# Patient Record
Sex: FEMALE | Race: BLACK OR AFRICAN AMERICAN | NOT HISPANIC OR LATINO | Employment: OTHER | URBAN - METROPOLITAN AREA
[De-identification: names, ages, dates, MRNs, and addresses within clinical notes are randomized per-mention and may not be internally consistent; named-entity substitution may affect disease eponyms.]

---

## 2019-08-07 DIAGNOSIS — M25.512 LEFT SHOULDER PAIN: Primary | ICD-10-CM

## 2019-08-23 ENCOUNTER — HOSPITAL ENCOUNTER (OUTPATIENT)
Dept: RADIOLOGY | Facility: HOSPITAL | Age: 64
Discharge: HOME | End: 2019-08-23
Attending: PAIN MEDICINE
Payer: MEDICARE

## 2019-08-23 DIAGNOSIS — M25.512 LEFT SHOULDER PAIN: ICD-10-CM

## 2019-08-23 DIAGNOSIS — M25.512 LEFT SHOULDER PAIN: Primary | ICD-10-CM

## 2019-08-23 PROCEDURE — 73030 X-RAY EXAM OF SHOULDER: CPT | Mod: TC,PO,LT

## 2019-09-16 ENCOUNTER — TELEPHONE (OUTPATIENT)
Dept: FAMILY MEDICINE | Facility: CLINIC | Age: 64
End: 2019-09-16

## 2019-09-16 DIAGNOSIS — E78.5 DM TYPE 2 WITH DIABETIC DYSLIPIDEMIA: ICD-10-CM

## 2019-09-16 DIAGNOSIS — N18.30 CKD (CHRONIC KIDNEY DISEASE) STAGE 3, GFR 30-59 ML/MIN: ICD-10-CM

## 2019-09-16 DIAGNOSIS — E78.5 DYSLIPIDEMIA: Primary | ICD-10-CM

## 2019-09-16 DIAGNOSIS — E11.69 DM TYPE 2 WITH DIABETIC DYSLIPIDEMIA: ICD-10-CM

## 2019-09-16 NOTE — TELEPHONE ENCOUNTER
----- Message from Joan Chen sent at 9/16/2019  2:48 PM CDT -----  Contact: Rhonda  The patient has an apt tomorrow with Priyanka. She did not have her labs done yet. Can she still be see ?  pts # [phone]

## 2019-09-16 NOTE — TELEPHONE ENCOUNTER
----- Message from Joan Chen sent at 9/16/2019  9:27 AM CDT -----  Contact: Rhonda  The patient has an apt September 17th. Does the patient need labs ? She goes to [facility] . pts # [phone]

## 2019-09-17 ENCOUNTER — OFFICE VISIT (OUTPATIENT)
Dept: FAMILY MEDICINE | Facility: CLINIC | Age: 64
End: 2019-09-17
Payer: MEDICARE

## 2019-09-17 VITALS
SYSTOLIC BLOOD PRESSURE: 150 MMHG | BODY MASS INDEX: 29.53 KG/M2 | WEIGHT: 173 LBS | HEIGHT: 64 IN | HEART RATE: 76 BPM | DIASTOLIC BLOOD PRESSURE: 72 MMHG

## 2019-09-17 DIAGNOSIS — K21.9 GASTROESOPHAGEAL REFLUX DISEASE, ESOPHAGITIS PRESENCE NOT SPECIFIED: ICD-10-CM

## 2019-09-17 DIAGNOSIS — E11.69 DM TYPE 2 WITH DIABETIC DYSLIPIDEMIA: Primary | ICD-10-CM

## 2019-09-17 DIAGNOSIS — M51.36 DDD (DEGENERATIVE DISC DISEASE), LUMBAR: ICD-10-CM

## 2019-09-17 DIAGNOSIS — I10 ESSENTIAL HYPERTENSION: ICD-10-CM

## 2019-09-17 DIAGNOSIS — E78.5 DYSLIPIDEMIA: ICD-10-CM

## 2019-09-17 DIAGNOSIS — E78.5 DM TYPE 2 WITH DIABETIC DYSLIPIDEMIA: Primary | ICD-10-CM

## 2019-09-17 LAB — HBA1C MFR BLD: 8 %

## 2019-09-17 PROCEDURE — 99213 PR OFFICE/OUTPT VISIT, EST, LEVL III, 20-29 MIN: ICD-10-PCS | Mod: S$GLB,,, | Performed by: NURSE PRACTITIONER

## 2019-09-17 PROCEDURE — 99213 OFFICE O/P EST LOW 20 MIN: CPT | Mod: S$GLB,,, | Performed by: NURSE PRACTITIONER

## 2019-09-17 PROCEDURE — 83036 POCT HEMOGLOBIN A1C: ICD-10-PCS | Mod: QW,,, | Performed by: NURSE PRACTITIONER

## 2019-09-17 PROCEDURE — 83036 HEMOGLOBIN GLYCOSYLATED A1C: CPT | Mod: QW,,, | Performed by: NURSE PRACTITIONER

## 2019-09-17 RX ORDER — HYDROCHLOROTHIAZIDE 25 MG/1
25 TABLET ORAL DAILY
Qty: 30 TABLET | Refills: 2 | Status: SHIPPED | OUTPATIENT
Start: 2019-09-17 | End: 2019-12-17

## 2019-09-17 RX ORDER — OMEPRAZOLE 20 MG/1
CAPSULE, DELAYED RELEASE ORAL
Refills: 0 | COMMUNITY
Start: 2019-07-16 | End: 2019-09-17 | Stop reason: SDUPTHER

## 2019-09-17 RX ORDER — MELOXICAM 15 MG/1
15 TABLET ORAL DAILY
Qty: 90 TABLET | Refills: 1 | Status: SHIPPED | OUTPATIENT
Start: 2019-09-17 | End: 2019-12-17 | Stop reason: SDUPTHER

## 2019-09-17 RX ORDER — CALCIUM CARBONATE/VITAMIN D3 600MG-5MCG
1 TABLET ORAL DAILY
COMMUNITY
Start: 2018-01-23

## 2019-09-17 RX ORDER — PRAVASTATIN SODIUM 10 MG/1
10 TABLET ORAL DAILY
Qty: 90 TABLET | Refills: 1 | Status: SHIPPED | OUTPATIENT
Start: 2019-09-17 | End: 2019-09-19 | Stop reason: DRUGHIGH

## 2019-09-17 RX ORDER — ACETAMINOPHEN 500 MG
1 TABLET ORAL DAILY
COMMUNITY
Start: 2018-01-23

## 2019-09-17 RX ORDER — METFORMIN HYDROCHLORIDE 1000 MG/1
1000 TABLET ORAL
COMMUNITY
Start: 2015-06-25 | End: 2019-12-17 | Stop reason: SDUPTHER

## 2019-09-17 RX ORDER — PRAVASTATIN SODIUM 10 MG/1
10 TABLET ORAL
COMMUNITY
Start: 2015-06-25 | End: 2019-09-17 | Stop reason: SDUPTHER

## 2019-09-17 RX ORDER — ASPIRIN 325 MG
325 TABLET ORAL DAILY
COMMUNITY
Start: 2015-11-04

## 2019-09-17 RX ORDER — MELOXICAM 15 MG/1
15 TABLET ORAL DAILY
COMMUNITY
End: 2019-09-17 | Stop reason: SDUPTHER

## 2019-09-17 RX ORDER — OMEPRAZOLE 20 MG/1
20 CAPSULE, DELAYED RELEASE ORAL DAILY
Qty: 90 CAPSULE | Refills: 1 | Status: SHIPPED | OUTPATIENT
Start: 2019-09-17 | End: 2022-02-03 | Stop reason: SDUPTHER

## 2019-09-17 NOTE — PROGRESS NOTES
SUBJECTIVE:    Patient ID: Rhonda Mojica is a 64 y.o. female.    Chief Complaint: Follow-up    Pt here for regular f/u. C/oing of left calf aching pain- went and saw Dr. Barron for lumbar DDD/sciatica, no new meds added and she has f/u tomorrow. Per Dr. Barron's note MBB was to be scheduled but pt reports she never had it.  Reports has been out of ozempic for over a month- can't afford the copay. Also admits hasn't been watching her diet d/t stress- checks sugars occasionally and it will run 150-180      Office Visit on 09/17/2019   Component Date Value Ref Range Status    Hemoglobin A1C 09/17/2019 8.0  % Final       History reviewed. No pertinent past medical history.  History reviewed. No pertinent surgical history.  History reviewed. No pertinent family history.    Marital Status:   Alcohol History:  reports that she does not drink alcohol.  Tobacco History:  reports that she has never smoked. She has never used smokeless tobacco.  Drug History:  reports that she does not use drugs.    Review of patient's allergies indicates:  No Known Allergies    Current Outpatient Medications:     aspirin 325 MG tablet, Take 325 mg by mouth., Disp: , Rfl:     calcium-vitamin D tablet 600 mg-200 units, Take by mouth., Disp: , Rfl:     cholecalciferol, vitamin D3, (VITAMIN D3) 2,000 unit Cap, Take by mouth., Disp: , Rfl:     meloxicam (MOBIC) 15 MG tablet, Take 1 tablet (15 mg total) by mouth once daily., Disp: 90 tablet, Rfl: 1    metFORMIN (GLUCOPHAGE) 1000 MG tablet, Take 1,000 mg by mouth., Disp: , Rfl:     omeprazole (PRILOSEC) 20 MG capsule, Take 1 capsule (20 mg total) by mouth once daily., Disp: 90 capsule, Rfl: 1    pravastatin (PRAVACHOL) 10 MG tablet, Take 1 tablet (10 mg total) by mouth once daily., Disp: 90 tablet, Rfl: 1    hydroCHLOROthiazide (HYDRODIURIL) 25 MG tablet, Take 1 tablet (25 mg total) by mouth once daily. Daily for 1 week then every other day, Disp: 30 tablet, Rfl: 2    semaglutide  "(OZEMPIC) 0.25 mg or 0.5 mg(2 mg/1.5 mL) PnIj, Inject 0.25 mg into the skin once a week. Inject 0.25mg once a week for 2 weeks then increase to 0.5mg once a week, Disp: 1.5 mL, Rfl: 0    Review of Systems   Constitutional: Negative for appetite change, chills and fever.   Respiratory: Negative for cough, shortness of breath and wheezing.    Cardiovascular: Positive for leg swelling (mild swelling to bilat feet/ankles). Negative for chest pain and palpitations.   Gastrointestinal: Negative for abdominal pain, constipation and diarrhea.   Genitourinary: Negative for difficulty urinating, dysuria, frequency and hematuria.   Musculoskeletal: Negative for back pain and gait problem.   Skin: Negative for rash.   Neurological: Positive for numbness (aching type pain left calf). Negative for dizziness and syncope.   Psychiatric/Behavioral: Negative for dysphoric mood. The patient is not nervous/anxious.           Objective:      Vitals:    09/17/19 1421 09/17/19 2035   BP: (!) 152/60 (!) 150/72   Pulse: 76    Weight: 78.5 kg (173 lb)    Height: 5' 4" (1.626 m)      Physical Exam   Constitutional: She is oriented to person, place, and time. She appears well-developed and well-nourished.   HENT:   Head: Normocephalic and atraumatic.   Right Ear: Tympanic membrane and ear canal normal.   Left Ear: Tympanic membrane and ear canal normal.   Mouth/Throat: Mucous membranes are normal. No posterior oropharyngeal erythema.   Neck: Neck supple. Carotid bruit is not present.   Cardiovascular: Normal rate and regular rhythm. Exam reveals no gallop and no friction rub.   Murmur (gr II/VI systolic murmur 2nd ICS) heard.  Pulses:       Dorsalis pedis pulses are 2+ on the right side, and 2+ on the left side.   Pulmonary/Chest: Breath sounds normal. No respiratory distress. She has no wheezes. She has no rales.   Abdominal: Soft. She exhibits no distension. There is no tenderness.   Musculoskeletal: She exhibits edema (trace edema bilat " feet/ankles, no erythema).   Lymphadenopathy:     She has no cervical adenopathy.   Neurological: She is alert and oriented to person, place, and time. She has normal strength. Gait normal.   Skin: Skin is warm and dry. No rash noted.   Psychiatric: She has a normal mood and affect.   Nursing note and vitals reviewed.        Assessment:       1. DM type 2 with diabetic dyslipidemia    2. Dyslipidemia    3. Gastroesophageal reflux disease, esophagitis presence not specified    4. Essential hypertension    5. DDD (degenerative disc disease), lumbar           Plan:       DM type 2 with diabetic dyslipidemia  -     Hemoglobin A1C, POCT  -     semaglutide (OZEMPIC) 0.25 mg or 0.5 mg(2 mg/1.5 mL) PnIj; Inject 0.25 mg into the skin once a week. Inject 0.25mg once a week for 2 weeks then increase to 0.5mg once a week  Dispense: 1.5 mL; Refill: 0  - A1C up to 7.7% d/t noncompliance with ozempic- pt reports can't afford medication. Given #1 sample of ozempic to resume weekly which should last 5 weeks, advised to call before sample is finished and see if we have more samples- if no samples available then may need to start low dose glimeperide which she can afford    Dyslipidemia  -     pravastatin (PRAVACHOL) 10 MG tablet; Take 1 tablet (10 mg total) by mouth once daily.  Dispense: 90 tablet; Refill: 1  -diabetic dyslipidema- had labs drawn this am- will call with abnormals    Gastroesophageal reflux disease, esophagitis presence not specified  -     omeprazole (PRILOSEC) 20 MG capsule; Take 1 capsule (20 mg total) by mouth once daily.  Dispense: 90 capsule; Refill: 1  -stable    Essential hypertension  -     hydroCHLOROthiazide (HYDRODIURIL) 25 MG tablet; Take 1 tablet (25 mg total) by mouth once daily. Daily for 1 week then every other day  Dispense: 30 tablet; Refill: 2  -BP elevated today with some mild ankle edema- will add HCTZ and discussed low salt diet- recommend nurse visit in 2 weeks for BP check    DDD  (degenerative disc disease), lumbar  -     meloxicam (MOBIC) 15 MG tablet; Take 1 tablet (15 mg total) by mouth once daily.  Dispense: 90 tablet; Refill: 1  - has f/u with Dr. Barron tomorrow    Follow up in about 3 months (around 12/17/2019) for nurse visit in 2 weeks for BP check.        9/17/2019 Priyanka Garibay, NP

## 2019-09-18 LAB
ALBUMIN SERPL-MCNC: 4.2 G/DL (ref 3.6–5.1)
ALBUMIN/GLOB SERPL: 1.6 (CALC) (ref 1–2.5)
ALP SERPL-CCNC: 113 U/L (ref 33–130)
ALT SERPL-CCNC: 16 U/L (ref 6–29)
AST SERPL-CCNC: 17 U/L (ref 10–35)
BASOPHILS # BLD AUTO: 75 CELLS/UL (ref 0–200)
BASOPHILS NFR BLD AUTO: 0.6 %
BILIRUB SERPL-MCNC: 0.6 MG/DL (ref 0.2–1.2)
BUN SERPL-MCNC: 19 MG/DL (ref 7–25)
BUN/CREAT SERPL: 16 (CALC) (ref 6–22)
CALCIUM SERPL-MCNC: 9.8 MG/DL (ref 8.6–10.4)
CHLORIDE SERPL-SCNC: 102 MMOL/L (ref 98–110)
CHOLEST SERPL-MCNC: 144 MG/DL
CHOLEST/HDLC SERPL: 4.2 (CALC)
CO2 SERPL-SCNC: 26 MMOL/L (ref 20–32)
CREAT SERPL-MCNC: 1.16 MG/DL (ref 0.5–0.99)
EOSINOPHIL # BLD AUTO: 513 CELLS/UL (ref 15–500)
EOSINOPHIL NFR BLD AUTO: 4.1 %
ERYTHROCYTE [DISTWIDTH] IN BLOOD BY AUTOMATED COUNT: 16.9 % (ref 11–15)
GFRSERPLBLD MDRD-ARVRAT: 49 ML/MIN/1.73M2
GLOBULIN SER CALC-MCNC: 2.7 G/DL (CALC) (ref 1.9–3.7)
GLUCOSE SERPL-MCNC: 178 MG/DL (ref 65–99)
HBA1C MFR BLD: 8.2 % OF TOTAL HGB
HCT VFR BLD AUTO: 34 % (ref 35–45)
HDLC SERPL-MCNC: 34 MG/DL
HGB BLD-MCNC: 11 G/DL (ref 11.7–15.5)
LDLC SERPL CALC-MCNC: 88 MG/DL (CALC)
LYMPHOCYTES # BLD AUTO: 2263 CELLS/UL (ref 850–3900)
LYMPHOCYTES NFR BLD AUTO: 18.1 %
MCH RBC QN AUTO: 22 PG (ref 27–33)
MCHC RBC AUTO-ENTMCNC: 32.4 G/DL (ref 32–36)
MCV RBC AUTO: 67.9 FL (ref 80–100)
MONOCYTES # BLD AUTO: 1100 CELLS/UL (ref 200–950)
MONOCYTES NFR BLD AUTO: 8.8 %
NEUTROPHILS # BLD AUTO: 8550 CELLS/UL (ref 1500–7800)
NEUTROPHILS NFR BLD AUTO: 68.4 %
NONHDLC SERPL-MCNC: 110 MG/DL (CALC)
PLATELET # BLD AUTO: 360 THOUSAND/UL (ref 140–400)
PMV BLD REES-ECKER: 10.4 FL (ref 7.5–12.5)
POTASSIUM SERPL-SCNC: 4.4 MMOL/L (ref 3.5–5.3)
PROT SERPL-MCNC: 6.9 G/DL (ref 6.1–8.1)
RBC # BLD AUTO: 5.01 MILLION/UL (ref 3.8–5.1)
SERVICE CMNT-IMP: ABNORMAL
SODIUM SERPL-SCNC: 139 MMOL/L (ref 135–146)
TRIGL SERPL-MCNC: 128 MG/DL
TSH SERPL-ACNC: 1.41 MIU/L (ref 0.4–4.5)
WBC # BLD AUTO: 12.5 THOUSAND/UL (ref 3.8–10.8)

## 2019-09-19 ENCOUNTER — TELEPHONE (OUTPATIENT)
Dept: FAMILY MEDICINE | Facility: CLINIC | Age: 64
End: 2019-09-19

## 2019-09-19 DIAGNOSIS — E78.5 DYSLIPIDEMIA: Primary | ICD-10-CM

## 2019-09-19 RX ORDER — PRAVASTATIN SODIUM 40 MG/1
40 TABLET ORAL DAILY
Qty: 90 TABLET | Refills: 1 | Status: SHIPPED | OUTPATIENT
Start: 2019-09-19 | End: 2019-12-17 | Stop reason: SDUPTHER

## 2019-09-19 NOTE — TELEPHONE ENCOUNTER
----- Message from Priyanka Garibay NP sent at 9/18/2019  9:23 PM CDT -----  Please call pt and let her know overall labs are stable. Blood sugar was high at 178 so make sure she's using ozempic sample I gave her and needs to be following diabetic diet. Mild anemia is stable and mildly elevated white blood cell count which is chronic is stable. Good cholesterol level is low and bad cholesterol (LDL) not quite at goal- recommend increasing pravastatin to 40mg daily. Thyroid function within normal range. Repeat lipids, CMP in 4 months

## 2019-09-19 NOTE — TELEPHONE ENCOUNTER
Spoke to patient with lab results from Priyanka. Read all results to patient. Patient verbalized understanding on all. New dose of Pravastatin pended for Priyanka to sign. Made note on rx to d/c prior dose. Allergies and pharmacy reviewed. Remind me created for orders.

## 2019-09-19 NOTE — TELEPHONE ENCOUNTER
Patient notified of results. Pravastatin pended for Priyanka to sign in telephone encounter. Remind me created for order.

## 2019-09-19 NOTE — TELEPHONE ENCOUNTER
Please call pt and let her know overall labs are stable. Blood sugar was high at 178 so make sure she's using ozempic sample I gave her and needs to be following diabetic diet. Mild anemia is stable and mildly elevated white blood cell count which is chronic is stable. Good cholesterol level is low and bad cholesterol (LDL) not quite at goal- recommend increasing pravastatin to 40mg daily. Thyroid function within normal range. Repeat lipids, CMP in 4 months

## 2019-09-24 ENCOUNTER — HOSPITAL ENCOUNTER (OUTPATIENT)
Dept: RADIOLOGY | Facility: HOSPITAL | Age: 64
Discharge: HOME | End: 2019-09-24
Attending: PAIN MEDICINE
Payer: MEDICARE

## 2019-09-24 DIAGNOSIS — M25.512 LEFT SHOULDER PAIN: Primary | ICD-10-CM

## 2019-09-24 DIAGNOSIS — M25.512 LEFT SHOULDER PAIN: ICD-10-CM

## 2019-09-24 DIAGNOSIS — M25.511 RIGHT SHOULDER PAIN: ICD-10-CM

## 2019-09-24 PROCEDURE — 73030 X-RAY EXAM OF SHOULDER: CPT | Mod: TC,50,PO

## 2019-12-05 ENCOUNTER — TELEPHONE (OUTPATIENT)
Dept: FAMILY MEDICINE | Facility: CLINIC | Age: 64
End: 2019-12-05

## 2019-12-05 DIAGNOSIS — E78.5 DYSLIPIDEMIA: Primary | ICD-10-CM

## 2019-12-05 DIAGNOSIS — E11.69 DM TYPE 2 WITH DIABETIC DYSLIPIDEMIA: ICD-10-CM

## 2019-12-05 DIAGNOSIS — E78.5 DM TYPE 2 WITH DIABETIC DYSLIPIDEMIA: ICD-10-CM

## 2019-12-05 NOTE — TELEPHONE ENCOUNTER
----- Message from [place] sent at 9/19/2019 12:19 PM CDT -----  Regarding: Labs due  Please call pt and let her know overall labs are stable. Blood sugar was high at 178 so make sure she's using ozempic sample I gave her and needs to be following diabetic diet. Mild anemia is stable and mildly elevated white blood cell count which is chronic is stable. Good cholesterol level is low and bad cholesterol (LDL) not quite at goal- recommend increasing pravastatin to 40mg daily. Thyroid function within normal range. Repeat lipids, CMP in 4 months

## 2019-12-05 NOTE — TELEPHONE ENCOUNTER
----- Message from Ge Lyn LPN sent at 12/5/2019  9:02 AM CST -----  Contact: Rhonda Mojica      ----- Message -----  From: Mary Ann Cooper  Sent: 12/5/2019   8:59 AM CST  To: [facility] Staff    Pt needs refill on Tizanidine 4MG tablet, semaglutide (OZEMPIC) 0.25 mg or 0.5 mg(2 mg/1.5 mL), pravastatin (PRAVACHOL) 40 MG tablet, omeprazole (PRILOSEC) 20 MG capsule, metFORMIN (GLUCOPHAGE) 1000 MG tablet, meloxicam (MOBIC) 15 MG tablet, and  hydroCHLOROthiazide (HYDRODIURIL) 25 MG tablet   [facility] to [facility] [address]   Pt# [phone]

## 2019-12-05 NOTE — TELEPHONE ENCOUNTER
Spoke to patient that fasting lab is due to recheck cholesterol. Orders pended. Updated remind me.

## 2019-12-12 LAB
ALBUMIN SERPL-MCNC: 3.9 G/DL (ref 3.6–5.1)
ALBUMIN/GLOB SERPL: 1.5 (CALC) (ref 1–2.5)
ALP SERPL-CCNC: 89 U/L (ref 33–130)
ALT SERPL-CCNC: 11 U/L (ref 6–29)
AST SERPL-CCNC: 12 U/L (ref 10–35)
BILIRUB SERPL-MCNC: 0.3 MG/DL (ref 0.2–1.2)
BUN SERPL-MCNC: 21 MG/DL (ref 7–25)
BUN/CREAT SERPL: 19 (CALC) (ref 6–22)
CALCIUM SERPL-MCNC: 9.2 MG/DL (ref 8.6–10.4)
CHLORIDE SERPL-SCNC: 106 MMOL/L (ref 98–110)
CHOLEST SERPL-MCNC: 125 MG/DL
CHOLEST/HDLC SERPL: 3.7 (CALC)
CO2 SERPL-SCNC: 30 MMOL/L (ref 20–32)
CREAT SERPL-MCNC: 1.11 MG/DL (ref 0.5–0.99)
GFRSERPLBLD MDRD-ARVRAT: 52 ML/MIN/1.73M2
GLOBULIN SER CALC-MCNC: 2.6 G/DL (CALC) (ref 1.9–3.7)
GLUCOSE SERPL-MCNC: 126 MG/DL (ref 65–99)
HDLC SERPL-MCNC: 34 MG/DL
LDLC SERPL CALC-MCNC: 73 MG/DL (CALC)
NONHDLC SERPL-MCNC: 91 MG/DL (CALC)
POTASSIUM SERPL-SCNC: 4.5 MMOL/L (ref 3.5–5.3)
PROT SERPL-MCNC: 6.5 G/DL (ref 6.1–8.1)
SODIUM SERPL-SCNC: 142 MMOL/L (ref 135–146)
TRIGL SERPL-MCNC: 98 MG/DL

## 2019-12-14 NOTE — TELEPHONE ENCOUNTER
Please let pt know labs are stable. Cholesterol levels well controlled. Blood sugar 126 at time of labs. Kidney function is stable and normal liver levels.

## 2019-12-16 ENCOUNTER — TELEPHONE (OUTPATIENT)
Dept: FAMILY MEDICINE | Facility: CLINIC | Age: 64
End: 2019-12-16

## 2019-12-16 NOTE — TELEPHONE ENCOUNTER
----- Message from Priyanka Garibay NP sent at 12/13/2019  6:18 PM CST -----  Please let pt know labs are stable. Cholesterol levels well controlled. Blood sugar 126 at time of labs. Kidney function is stable and normal liver levels.

## 2019-12-17 ENCOUNTER — OFFICE VISIT (OUTPATIENT)
Dept: FAMILY MEDICINE | Facility: CLINIC | Age: 64
End: 2019-12-17
Payer: MEDICARE

## 2019-12-17 VITALS
HEIGHT: 64 IN | SYSTOLIC BLOOD PRESSURE: 160 MMHG | DIASTOLIC BLOOD PRESSURE: 60 MMHG | HEART RATE: 76 BPM | BODY MASS INDEX: 28.34 KG/M2 | WEIGHT: 166 LBS

## 2019-12-17 DIAGNOSIS — E11.69 DM TYPE 2 WITH DIABETIC DYSLIPIDEMIA: Primary | ICD-10-CM

## 2019-12-17 DIAGNOSIS — M51.36 DDD (DEGENERATIVE DISC DISEASE), LUMBAR: ICD-10-CM

## 2019-12-17 DIAGNOSIS — E78.5 DM TYPE 2 WITH DIABETIC DYSLIPIDEMIA: Primary | ICD-10-CM

## 2019-12-17 DIAGNOSIS — I10 ESSENTIAL HYPERTENSION: ICD-10-CM

## 2019-12-17 DIAGNOSIS — E78.5 DYSLIPIDEMIA: ICD-10-CM

## 2019-12-17 PROCEDURE — 3008F BODY MASS INDEX DOCD: CPT | Mod: S$GLB,,, | Performed by: NURSE PRACTITIONER

## 2019-12-17 PROCEDURE — 99214 PR OFFICE/OUTPT VISIT, EST, LEVL IV, 30-39 MIN: ICD-10-PCS | Mod: S$GLB,,, | Performed by: NURSE PRACTITIONER

## 2019-12-17 PROCEDURE — 1101F PR PT FALLS ASSESS DOC 0-1 FALLS W/OUT INJ PAST YR: ICD-10-PCS | Mod: S$GLB,,, | Performed by: NURSE PRACTITIONER

## 2019-12-17 PROCEDURE — 1101F PT FALLS ASSESS-DOCD LE1/YR: CPT | Mod: S$GLB,,, | Performed by: NURSE PRACTITIONER

## 2019-12-17 PROCEDURE — 3008F PR BODY MASS INDEX (BMI) DOCUMENTED: ICD-10-PCS | Mod: S$GLB,,, | Performed by: NURSE PRACTITIONER

## 2019-12-17 PROCEDURE — 99214 OFFICE O/P EST MOD 30 MIN: CPT | Mod: S$GLB,,, | Performed by: NURSE PRACTITIONER

## 2019-12-17 RX ORDER — PRAVASTATIN SODIUM 20 MG/1
TABLET ORAL
COMMUNITY
Start: 2017-08-24 | End: 2019-12-17

## 2019-12-17 RX ORDER — TIZANIDINE 4 MG/1
4 TABLET ORAL NIGHTLY PRN
Qty: 90 TABLET | Refills: 1 | Status: SHIPPED | OUTPATIENT
Start: 2019-12-17 | End: 2021-02-25 | Stop reason: SDUPTHER

## 2019-12-17 RX ORDER — GLIPIZIDE 5 MG/1
5 TABLET ORAL
Qty: 90 TABLET | Refills: 1 | Status: SHIPPED | OUTPATIENT
Start: 2019-12-17 | End: 2020-11-20 | Stop reason: SDUPTHER

## 2019-12-17 RX ORDER — LISINOPRIL AND HYDROCHLOROTHIAZIDE 12.5; 2 MG/1; MG/1
1 TABLET ORAL DAILY
Qty: 90 TABLET | Refills: 1 | Status: SHIPPED | OUTPATIENT
Start: 2019-12-17 | End: 2021-04-22 | Stop reason: SDUPTHER

## 2019-12-17 RX ORDER — METFORMIN HYDROCHLORIDE 1000 MG/1
1000 TABLET ORAL 2 TIMES DAILY WITH MEALS
Qty: 180 TABLET | Refills: 1 | Status: SHIPPED | OUTPATIENT
Start: 2019-12-17 | End: 2021-04-22 | Stop reason: SDUPTHER

## 2019-12-17 RX ORDER — AMLODIPINE BESYLATE 5 MG/1
5 TABLET ORAL DAILY
Qty: 90 TABLET | Refills: 1 | Status: SHIPPED | OUTPATIENT
Start: 2019-12-17 | End: 2021-04-22 | Stop reason: SDUPTHER

## 2019-12-17 RX ORDER — MELOXICAM 15 MG/1
15 TABLET ORAL DAILY
Qty: 90 TABLET | Refills: 1 | Status: SHIPPED | OUTPATIENT
Start: 2019-12-17 | End: 2021-04-22 | Stop reason: SDUPTHER

## 2019-12-17 RX ORDER — PRAVASTATIN SODIUM 40 MG/1
40 TABLET ORAL DAILY
Qty: 90 TABLET | Refills: 1 | Status: SHIPPED | OUTPATIENT
Start: 2019-12-17 | End: 2021-05-27 | Stop reason: SDUPTHER

## 2019-12-17 RX ORDER — TIZANIDINE 4 MG/1
4 TABLET ORAL NIGHTLY PRN
COMMUNITY
Start: 2015-06-25 | End: 2019-12-17 | Stop reason: SDUPTHER

## 2019-12-17 RX ORDER — AMLODIPINE BESYLATE 5 MG/1
5 TABLET ORAL DAILY
COMMUNITY
End: 2019-12-17 | Stop reason: SDUPTHER

## 2019-12-17 RX ORDER — GLIPIZIDE 5 MG/1
5 TABLET ORAL
Refills: 1 | COMMUNITY
Start: 2019-09-17 | End: 2019-12-17 | Stop reason: SDUPTHER

## 2019-12-17 RX ORDER — LISINOPRIL AND HYDROCHLOROTHIAZIDE 12.5; 2 MG/1; MG/1
1 TABLET ORAL DAILY
COMMUNITY
End: 2019-12-17 | Stop reason: SDUPTHER

## 2019-12-17 NOTE — PROGRESS NOTES
SUBJECTIVE:    Patient ID: Rhonda Mojica is a 64 y.o. female.    Chief Complaint: Follow-up (no bottles// SW)    Pt here for regular DM f/u. Reports FBS ranging 120-160s and evening sugars 150-160. Used the ozempic sample given at last visit which does seem to help her sugar but ran out of it and can't afford it.  Overall feeling okay. Reports left leg pain she was previously having hasn't been bothering her that much.       Telephone on 12/05/2019   Component Date Value Ref Range Status    Glucose 12/11/2019 126* 65 - 99 mg/dL Final    BUN, Bld 12/11/2019 21  7 - 25 mg/dL Final    Creatinine 12/11/2019 1.11* 0.50 - 0.99 mg/dL Final    eGFR if non African American 12/11/2019 52* > OR = 60 mL/min/1.73m2 Final    eGFR if African American 12/11/2019 60  > OR = 60 mL/min/1.73m2 Final    BUN/Creatinine Ratio 12/11/2019 19  6 - 22 (calc) Final    Sodium 12/11/2019 142  135 - 146 mmol/L Final    Potassium 12/11/2019 4.5  3.5 - 5.3 mmol/L Final    Chloride 12/11/2019 106  98 - 110 mmol/L Final    CO2 12/11/2019 30  20 - 32 mmol/L Final    Calcium 12/11/2019 9.2  8.6 - 10.4 mg/dL Final    Total Protein 12/11/2019 6.5  6.1 - 8.1 g/dL Final    Albumin 12/11/2019 3.9  3.6 - 5.1 g/dL Final    Globulin, Total 12/11/2019 2.6  1.9 - 3.7 g/dL (calc) Final    Albumin/Globulin Ratio 12/11/2019 1.5  1.0 - 2.5 (calc) Final    Total Bilirubin 12/11/2019 0.3  0.2 - 1.2 mg/dL Final    Alkaline Phosphatase 12/11/2019 89  33 - 130 U/L Final    AST 12/11/2019 12  10 - 35 U/L Final    ALT 12/11/2019 11  6 - 29 U/L Final    Cholesterol 12/11/2019 125  <200 mg/dL Final    HDL 12/11/2019 34* >50 mg/dL Final    Triglycerides 12/11/2019 98  <150 mg/dL Final    LDL Cholesterol 12/11/2019 73  mg/dL (calc) Final    Hdl/Cholesterol Ratio 12/11/2019 3.7  <5.0 (calc) Final    Non HDL Chol. (LDL+VLDL) 12/11/2019 91  <130 mg/dL (calc) Final   Office Visit on 09/17/2019   Component Date Value Ref Range Status    Hemoglobin A1C  09/17/2019 8.0  % Final   Telephone on 09/16/2019   Component Date Value Ref Range Status    Glucose 09/17/2019 178* 65 - 99 mg/dL Final    BUN, Bld 09/17/2019 19  7 - 25 mg/dL Final    Creatinine 09/17/2019 1.16* 0.50 - 0.99 mg/dL Final    eGFR if non African American 09/17/2019 49* > OR = 60 mL/min/1.73m2 Final    eGFR if  09/17/2019 57* > OR = 60 mL/min/1.73m2 Final    BUN/Creatinine Ratio 09/17/2019 16  6 - 22 (calc) Final    Sodium 09/17/2019 139  135 - 146 mmol/L Final    Potassium 09/17/2019 4.4  3.5 - 5.3 mmol/L Final    Chloride 09/17/2019 102  98 - 110 mmol/L Final    CO2 09/17/2019 26  20 - 32 mmol/L Final    Calcium 09/17/2019 9.8  8.6 - 10.4 mg/dL Final    Total Protein 09/17/2019 6.9  6.1 - 8.1 g/dL Final    Albumin 09/17/2019 4.2  3.6 - 5.1 g/dL Final    Globulin, Total 09/17/2019 2.7  1.9 - 3.7 g/dL (calc) Final    Albumin/Globulin Ratio 09/17/2019 1.6  1.0 - 2.5 (calc) Final    Total Bilirubin 09/17/2019 0.6  0.2 - 1.2 mg/dL Final    Alkaline Phosphatase 09/17/2019 113  33 - 130 U/L Final    AST 09/17/2019 17  10 - 35 U/L Final    ALT 09/17/2019 16  6 - 29 U/L Final    TSH w/reflex to FT4 09/17/2019 1.41  0.40 - 4.50 mIU/L Final    WBC 09/17/2019 12.5* 3.8 - 10.8 Thousand/uL Final    RBC 09/17/2019 5.01  3.80 - 5.10 Million/uL Final    Hemoglobin 09/17/2019 11.0* 11.7 - 15.5 g/dL Final    Hematocrit 09/17/2019 34.0* 35.0 - 45.0 % Final    Mean Corpuscular Volume 09/17/2019 67.9* 80.0 - 100.0 fL Final    Mean Corpuscular Hemoglobin 09/17/2019 22.0* 27.0 - 33.0 pg Final    Mean Corpuscular Hemoglobin Conc 09/17/2019 32.4  32.0 - 36.0 g/dL Final    RDW 09/17/2019 16.9* 11.0 - 15.0 % Final    Platelets 09/17/2019 360  140 - 400 Thousand/uL Final    MPV 09/17/2019 10.4  7.5 - 12.5 fL Final    Neutrophils Absolute 09/17/2019 8,550* 1,500 - 7,800 cells/uL Final    Lymph # 09/17/2019 2,263  850 - 3,900 cells/uL Final    Mono # 09/17/2019 1,100* 200 - 950  cells/uL Final    Eos # 09/17/2019 513* 15 - 500 cells/uL Final    Baso # 09/17/2019 75  0 - 200 cells/uL Final    Neutrophils Relative 09/17/2019 68.4  % Final    Lymph% 09/17/2019 18.1  % Final    Mono% 09/17/2019 8.8  % Final    Eosinophil% 09/17/2019 4.1  % Final    Basophil% 09/17/2019 0.6  % Final    Differential Comment 09/17/2019 Microcytosis 1 + Hypochromasia 1 + Anisocytosis 1 +   Final    Cholesterol 09/17/2019 144  <200 mg/dL Final    HDL 09/17/2019 34* >50 mg/dL Final    Triglycerides 09/17/2019 128  <150 mg/dL Final    LDL Cholesterol 09/17/2019 88  mg/dL (calc) Final    Hdl/Cholesterol Ratio 09/17/2019 4.2  <5.0 (calc) Final    Non HDL Chol. (LDL+VLDL) 09/17/2019 110  <130 mg/dL (calc) Final    Hemoglobin A1C 09/17/2019 8.2* <5.7 % of total Hgb Final       Past Medical History:   Diagnosis Date    DDD (degenerative disc disease), lumbar     DM type 2 with diabetic dyslipidemia     Dyslipidemia     GERD (gastroesophageal reflux disease)      History reviewed. No pertinent surgical history.  History reviewed. No pertinent family history.    Marital Status:   Alcohol History:  reports that she does not drink alcohol.  Tobacco History:  reports that she has never smoked. She has never used smokeless tobacco.  Drug History:  reports that she does not use drugs.    Review of patient's allergies indicates:  No Known Allergies    Current Outpatient Medications:     glipiZIDE (GLUCOTROL) 5 MG tablet, Take 1 tablet (5 mg total) by mouth daily with breakfast., Disp: 90 tablet, Rfl: 1    tiZANidine (ZANAFLEX) 4 MG tablet, Take 1 tablet (4 mg total) by mouth nightly as needed., Disp: 90 tablet, Rfl: 1    amLODIPine (NORVASC) 5 MG tablet, Take 1 tablet (5 mg total) by mouth once daily., Disp: 90 tablet, Rfl: 1    aspirin 325 MG tablet, Take 325 mg by mouth., Disp: , Rfl:     calcium-vitamin D tablet 600 mg-200 units, Take by mouth., Disp: , Rfl:     cholecalciferol, vitamin D3,  "(VITAMIN D3) 2,000 unit Cap, Take by mouth., Disp: , Rfl:     lisinopril-hydrochlorothiazide (PRINZIDE,ZESTORETIC) 20-12.5 mg per tablet, Take 1 tablet by mouth once daily., Disp: 90 tablet, Rfl: 1    meloxicam (MOBIC) 15 MG tablet, Take 1 tablet (15 mg total) by mouth once daily., Disp: 90 tablet, Rfl: 1    metFORMIN (GLUCOPHAGE) 1000 MG tablet, Take 1 tablet (1,000 mg total) by mouth 2 (two) times daily with meals., Disp: 180 tablet, Rfl: 1    omeprazole (PRILOSEC) 20 MG capsule, Take 1 capsule (20 mg total) by mouth once daily., Disp: 90 capsule, Rfl: 1    pravastatin (PRAVACHOL) 40 MG tablet, Take 1 tablet (40 mg total) by mouth once daily. For cholesterol., Disp: 90 tablet, Rfl: 1    semaglutide (OZEMPIC) 0.25 mg or 0.5 mg(2 mg/1.5 mL) PnIj, Inject 0.5 mg into the skin once a week. Inject 0.25mg once a week for 2 weeks then increase to 0.5mg once a week, Disp: 1 Syringe, Rfl: 5    Review of Systems   Constitutional: Negative for chills and fever.   HENT: Negative for sore throat.    Respiratory: Negative for cough, shortness of breath and wheezing.    Cardiovascular: Negative for chest pain, palpitations and leg swelling.   Gastrointestinal: Negative for abdominal pain, constipation and diarrhea.   Genitourinary: Negative for dysuria and hematuria.   Musculoskeletal: Positive for arthralgias (left shoulder pain intermittently) and myalgias (intermittent left calf pain, usually with walking).   Skin: Negative for rash.   Neurological: Negative for dizziness, numbness and headaches.   Psychiatric/Behavioral: Negative for dysphoric mood.          Objective:      Vitals:    12/17/19 0844 12/17/19 0848 12/17/19 0916   BP: (!) 160/74 (!) 170/60 (!) 160/60   Pulse: 76     Weight: 75.3 kg (166 lb)     Height: 5' 4" (1.626 m)       Physical Exam   Constitutional: She is oriented to person, place, and time. She appears well-developed and well-nourished.   HENT:   Head: Normocephalic and atraumatic.   Right Ear: " Tympanic membrane and ear canal normal.   Left Ear: Tympanic membrane and ear canal normal.   Mouth/Throat: Mucous membranes are normal. No posterior oropharyngeal erythema.   Neck: Neck supple. Carotid bruit is not present.   Cardiovascular: Normal rate and regular rhythm. Exam reveals no gallop and no friction rub.   Murmur (2nd ICS LSB) heard.   Systolic murmur is present with a grade of 2/6.  Pulmonary/Chest: Effort normal and breath sounds normal. No respiratory distress. She has no wheezes. She has no rales.   Abdominal: Soft. She exhibits no distension. There is no tenderness.   Lymphadenopathy:     She has no cervical adenopathy.   Neurological: She is alert and oriented to person, place, and time. She has normal strength. Gait normal.   Skin: Skin is warm and dry. No rash noted.   Psychiatric: She has a normal mood and affect.   Nursing note and vitals reviewed.        Assessment:       1. DM type 2 with diabetic dyslipidemia    2. Dyslipidemia    3. Essential hypertension    4. DDD (degenerative disc disease), lumbar           Plan:       DM type 2 with diabetic dyslipidemia  -     glipiZIDE (GLUCOTROL) 5 MG tablet; Take 1 tablet (5 mg total) by mouth daily with breakfast.  Dispense: 90 tablet; Refill: 1  -     metFORMIN (GLUCOPHAGE) 1000 MG tablet; Take 1 tablet (1,000 mg total) by mouth 2 (two) times daily with meals.  Dispense: 180 tablet; Refill: 1  -     semaglutide (OZEMPIC) 0.25 mg or 0.5 mg(2 mg/1.5 mL) PnIj; Inject 0.5 mg into the skin once a week. Inject 0.25mg once a week for 2 weeks then increase to 0.5mg once a week  Dispense: 1 Syringe; Refill: 5  -     Hemoglobin A1c; Future; Expected date: 12/17/2019  -reviewed with patient recent labs, blood sugar improved to 126-no A1c drawn with labs.  Advised the still recommend she continue with ozempic so given another sample and recommended 0.5 mg weekly also sent prescription to the pharmacy and see how much her co-pay will be at beginning of the  year    Dyslipidemia  -     pravastatin (PRAVACHOL) 40 MG tablet; Take 1 tablet (40 mg total) by mouth once daily. For cholesterol.  Dispense: 90 tablet; Refill: 1  -diabetic dyslipidemia,  improved with increase in pravastatin dose    Essential hypertension  -     amLODIPine (NORVASC) 5 MG tablet; Take 1 tablet (5 mg total) by mouth once daily.  Dispense: 90 tablet; Refill: 1  -     lisinopril-hydrochlorothiazide (PRINZIDE,ZESTORETIC) 20-12.5 mg per tablet; Take 1 tablet by mouth once daily.  Dispense: 90 tablet; Refill: 1  -BP elevated today, patient reports she did not take her blood pressure medicine this morning.  Stressed importance of medication compliance and recommended follow-up in 2 weeks for blood pressure check    DDD (degenerative disc disease), lumbar  -     tiZANidine (ZANAFLEX) 4 MG tablet; Take 1 tablet (4 mg total) by mouth nightly as needed.  Dispense: 90 tablet; Refill: 1  -     meloxicam (MOBIC) 15 MG tablet; Take 1 tablet (15 mg total) by mouth once daily.  Dispense: 90 tablet; Refill: 1  -stable on current meds    Follow up in about 3 months (around 3/17/2020) for Labs before next visit, nurse visit in 2 weeks for BP check.        12/17/2019 Priyanka Garibay NP

## 2020-01-16 ENCOUNTER — HOSPITAL ENCOUNTER (EMERGENCY)
Facility: HOSPITAL | Age: 65
Discharge: HOME | End: 2020-01-16
Attending: EMERGENCY MEDICINE
Payer: MEDICARE

## 2020-01-16 VITALS
TEMPERATURE: 98 F | OXYGEN SATURATION: 97 % | HEART RATE: 82 BPM | RESPIRATION RATE: 16 BRPM | WEIGHT: 166 LBS | BODY MASS INDEX: 28.49 KG/M2 | DIASTOLIC BLOOD PRESSURE: 78 MMHG | SYSTOLIC BLOOD PRESSURE: 169 MMHG

## 2020-01-16 DIAGNOSIS — M54.9 BACK PAIN: ICD-10-CM

## 2020-01-16 DIAGNOSIS — M54.6 ACUTE RIGHT-SIDED THORACIC BACK PAIN: Primary | ICD-10-CM

## 2020-01-16 DIAGNOSIS — R10.9 ABDOMINAL PAIN: ICD-10-CM

## 2020-01-16 LAB
ANION GAP SERPL CALC-SCNC: 10 MMOL/L (ref 8–16)
BACTERIA #/AREA URNS HPF: ABNORMAL /HPF
BASOPHILS # BLD AUTO: 0.04 K/UL (ref 0–0.2)
BASOPHILS NFR BLD: 0.4 % (ref 0–1.9)
BILIRUB UR QL STRIP: NEGATIVE
BUN SERPL-MCNC: 13 MG/DL (ref 8–23)
CALCIUM SERPL-MCNC: 9.8 MG/DL (ref 8.7–10.5)
CHLORIDE SERPL-SCNC: 106 MMOL/L (ref 95–110)
CLARITY UR: CLEAR
CO2 SERPL-SCNC: 26 MMOL/L (ref 23–29)
COLOR UR: YELLOW
CREAT SERPL-MCNC: 1 MG/DL (ref 0.5–1.4)
DIFFERENTIAL METHOD: ABNORMAL
EOSINOPHIL # BLD AUTO: 0.2 K/UL (ref 0–0.5)
EOSINOPHIL NFR BLD: 2 % (ref 0–8)
ERYTHROCYTE [DISTWIDTH] IN BLOOD BY AUTOMATED COUNT: 14.8 % (ref 11.5–14.5)
EST. GFR  (AFRICAN AMERICAN): >60 ML/MIN/1.73 M^2
EST. GFR  (NON AFRICAN AMERICAN): 59 ML/MIN/1.73 M^2
GLUCOSE SERPL-MCNC: 131 MG/DL (ref 70–110)
GLUCOSE UR QL STRIP: NEGATIVE
HCT VFR BLD AUTO: 34.5 % (ref 37–48.5)
HGB BLD-MCNC: 11.2 G/DL (ref 12–16)
HGB UR QL STRIP: NEGATIVE
IMM GRANULOCYTES # BLD AUTO: 0.03 K/UL (ref 0–0.04)
KETONES UR QL STRIP: NEGATIVE
LEUKOCYTE ESTERASE UR QL STRIP: ABNORMAL
LYMPHOCYTES # BLD AUTO: 2.4 K/UL (ref 1–4.8)
LYMPHOCYTES NFR BLD: 22.8 % (ref 18–48)
MCH RBC QN AUTO: 21.7 PG (ref 27–31)
MCHC RBC AUTO-ENTMCNC: 32.5 G/DL (ref 32–36)
MCV RBC AUTO: 67 FL (ref 82–98)
MICROSCOPIC COMMENT: ABNORMAL
MONOCYTES # BLD AUTO: 0.8 K/UL (ref 0.3–1)
MONOCYTES NFR BLD: 7.9 % (ref 4–15)
NEUTROPHILS # BLD AUTO: 7.1 K/UL (ref 1.8–7.7)
NEUTROPHILS NFR BLD: 66.6 % (ref 38–73)
NITRITE UR QL STRIP: NEGATIVE
NRBC BLD-RTO: 0 /100 WBC
PH UR STRIP: 8 [PH] (ref 5–8)
PLATELET # BLD AUTO: 357 K/UL (ref 150–350)
PMV BLD AUTO: 9.9 FL (ref 9.2–12.9)
POTASSIUM SERPL-SCNC: 4.1 MMOL/L (ref 3.5–5.1)
PROT UR QL STRIP: NEGATIVE
RBC # BLD AUTO: 5.16 M/UL (ref 4–5.4)
RBC #/AREA URNS HPF: 1 /HPF (ref 0–4)
SODIUM SERPL-SCNC: 142 MMOL/L (ref 136–145)
SP GR UR STRIP: 1.01 (ref 1–1.03)
TROPONIN I SERPL DL<=0.01 NG/ML-MCNC: <0.006 NG/ML (ref 0–0.03)
URN SPEC COLLECT METH UR: ABNORMAL
UROBILINOGEN UR STRIP-ACNC: 1 EU/DL
WBC # BLD AUTO: 10.68 K/UL (ref 3.9–12.7)
WBC #/AREA URNS HPF: 15 /HPF (ref 0–5)

## 2020-01-16 PROCEDURE — 80048 BASIC METABOLIC PNL TOTAL CA: CPT

## 2020-01-16 PROCEDURE — 81000 URINALYSIS NONAUTO W/SCOPE: CPT

## 2020-01-16 PROCEDURE — 25000003 PHARM REV CODE 250: Performed by: EMERGENCY MEDICINE

## 2020-01-16 PROCEDURE — 93005 ELECTROCARDIOGRAM TRACING: CPT

## 2020-01-16 PROCEDURE — 87086 URINE CULTURE/COLONY COUNT: CPT

## 2020-01-16 PROCEDURE — 36415 COLL VENOUS BLD VENIPUNCTURE: CPT

## 2020-01-16 PROCEDURE — 99285 EMERGENCY DEPT VISIT HI MDM: CPT | Mod: 25

## 2020-01-16 PROCEDURE — 85025 COMPLETE CBC W/AUTO DIFF WBC: CPT

## 2020-01-16 PROCEDURE — 84484 ASSAY OF TROPONIN QUANT: CPT

## 2020-01-16 RX ORDER — ACETAMINOPHEN 325 MG/1
650 TABLET ORAL
Status: COMPLETED | OUTPATIENT
Start: 2020-01-16 | End: 2020-01-16

## 2020-01-16 RX ADMIN — ACETAMINOPHEN 650 MG: 325 TABLET ORAL at 06:01

## 2020-01-16 NOTE — ED PROVIDER NOTES
Encounter Date: 1/16/2020    SCRIBE #1 NOTE: I, Aida Ahn, am scribing for, and in the presence of, Deion Catherine MD.       History     Chief Complaint   Patient presents with    Abdominal Pain     right lateral. started this nathan, denies urinary symptoms.        Time seen by provider: 5:46 PM on 01/16/2020    Rhonda Mojica is a 64 y.o. female with a PMHx of HLD, DM, HTN and GERD who presents to the ED with an onset of constant right sided back pain which started when the patient woke up this morning. Patient endorsed pain is worse with movement of the right shoulder and denies alleviating factors. Patient reports taking Tylenol for pain earlier this afternoon. The patient denies abdominal pain, vomiting, diarrhea, blood in stool or any other symptoms at this time. No pertinent PSHx.      The history is provided by the patient.     Review of patient's allergies indicates:  No Known Allergies  Past Medical History:   Diagnosis Date    DDD (degenerative disc disease), lumbar     DM type 2 with diabetic dyslipidemia     Dyslipidemia     GERD (gastroesophageal reflux disease)      No past surgical history on file.  No family history on file.  Social History     Tobacco Use    Smoking status: Never Smoker    Smokeless tobacco: Never Used   Substance Use Topics    Alcohol use: Never     Frequency: Never    Drug use: Never     Review of Systems   Constitutional: Negative for fever.   HENT: Negative for sore throat.    Respiratory: Negative for shortness of breath.    Cardiovascular: Negative for chest pain.   Gastrointestinal: Negative for abdominal pain, nausea and vomiting.   Genitourinary: Negative for dysuria.   Musculoskeletal: Positive for back pain.   Skin: Negative for rash.   Neurological: Negative for weakness.   Hematological: Does not bruise/bleed easily.       Physical Exam     Initial Vitals [01/16/20 1528]   BP Pulse Resp Temp SpO2   (!) 200/83 72 16 98.3 °F (36.8 °C) 98 %      MAP        --         Physical Exam    Nursing note and vitals reviewed.  Constitutional: She appears well-developed and well-nourished. She is not diaphoretic. No distress.   HENT:   Head: Normocephalic and atraumatic.   Mouth/Throat: Oropharynx is clear and moist.   Eyes: Conjunctivae are normal.   Neck: Neck supple.   Cardiovascular: Normal rate, regular rhythm, normal heart sounds and intact distal pulses. Exam reveals no gallop and no friction rub.    No murmur heard.  Pulses:       Radial pulses are 2+ on the right side, and 2+ on the left side.        Dorsalis pedis pulses are 2+ on the right side, and 2+ on the left side.        Posterior tibial pulses are 2+ on the right side, and 2+ on the left side.   Pulmonary/Chest: Breath sounds normal. She has no wheezes. She has no rhonchi. She has no rales.   Abdominal: Soft. She exhibits no distension. There is no tenderness.   Musculoskeletal: Normal range of motion.   Reproducible pain to right lateral thoracic back at around T8 wrapping around to the lateral chest wall, accentuated with certain movements of the torso. No tenderness to right shoulder or extremity. No midline vertebral tenderness.   Neurological: She is alert and oriented to person, place, and time.   5/5 strength and sensation intact to light touch to BUEs.   Skin: No rash noted. No erythema.   Psychiatric: Her speech is normal.         ED Course   Procedures  Labs Reviewed   URINALYSIS, REFLEX TO URINE CULTURE - Abnormal; Notable for the following components:       Result Value    Leukocytes, UA 1+ (*)     All other components within normal limits    Narrative:     Preferred Collection Type->Urine, Clean Catch   CBC W/ AUTO DIFFERENTIAL - Abnormal; Notable for the following components:    Hemoglobin 11.2 (*)     Hematocrit 34.5 (*)     Mean Corpuscular Volume 67 (*)     Mean Corpuscular Hemoglobin 21.7 (*)     RDW 14.8 (*)     Platelets 357 (*)     All other components within normal limits   BASIC  METABOLIC PANEL - Abnormal; Notable for the following components:    Glucose 131 (*)     eGFR if non  59 (*)     All other components within normal limits   URINALYSIS MICROSCOPIC - Abnormal; Notable for the following components:    WBC, UA 15 (*)     Bacteria Few (*)     All other components within normal limits    Narrative:     Preferred Collection Type->Urine, Clean Catch   CULTURE, URINE   TROPONIN I          Imaging Results          X-Ray Chest 1 View (In process)                  Medical Decision Making:   History:   Old Medical Records: I decided to obtain old medical records.  Independently Interpreted Test(s):   I have ordered and independently interpreted X-rays - see prior notes.  I have ordered and independently interpreted EKG Reading(s) - see prior notes  Clinical Tests:   Lab Tests: Ordered and Reviewed  Radiological Study: Ordered and Reviewed  Medical Tests: Ordered and Reviewed            Scribe Attestation:   Scribe #1: I performed the above scribed service and the documentation accurately describes the services I performed. I attest to the accuracy of the note.    I, Dr. Deion Catherine, personally performed the services described in this documentation. All medical record entries made by the scribe were at my direction and in my presence.  I have reviewed the chart and agree that the record reflects my personal performance and is accurate and complete. Deion Catherine MD.  11:23 PM 01/16/2020    Rhonda Mojica is a 64 y.o. female presenting with right lateral thoracic back pain reproducible with engagement of this area by movement of the patient's torso.  I suspect musculoskeletal etiology.  Urine ordered in triage positive markers discussed with patient.  Patient has no other urinary complaints. I doubt pyelonephritis.  I did discuss immediate antibiotics versus awaiting urine culture and she does prefer the latter.  I do feel this is reasonable and overall doubt UTI.  I  have low suspicion for other emergent, life-threatening intrathoracic process such as pneumothorax, aortic dissection, PE.  I do not think further CT is indicated.  I doubt coronary equivalent.  EKG reviewed with negative cardiac biomarker with symptoms throughout the day.  Symptomatic treatment as necessary reviewed for home.  Follow up with PCP.  Detailed return precautions reviewed as well.        ED Course as of Jan 16 1858   Thu Jan 16, 2020   1813 EKG:  NSR, rate of 82, L axis.  Normal intervals. LVH with repolarization changes.  T-wave inversion in I and aVL.  No reciprocal changes or significant ST devation.  No prior for comparison.  No sign of acute ischemia or infarction.      [MR]   1828 CXR:  NAD compared to prior. (my read)    [MR]      ED Course User Index  [MR] Deion Catherine MD                Clinical Impression:       ICD-10-CM ICD-9-CM   1. Acute right-sided thoracic back pain M54.6 724.1   2. Abdominal pain R10.9 789.00   3. Back pain M54.9 724.5         Disposition:   Disposition: Discharged  Condition: Stable                     Deion Catherine MD  01/16/20 5320

## 2020-01-17 NOTE — ED NOTES
C/o right shoulder and upper back pain that started this am, took tylenol earlier today but it was not helpful and pain feels worse, denies shortness of breath or recent illness or fever. Even non labored respirations. ROM sensation pulses intact. Call light handed to pt aware to notify nurse of needs or concerns.

## 2020-01-18 LAB — BACTERIA UR CULT: NORMAL

## 2020-03-04 ENCOUNTER — TELEPHONE (OUTPATIENT)
Dept: FAMILY MEDICINE | Facility: CLINIC | Age: 65
End: 2020-03-04

## 2020-03-04 NOTE — TELEPHONE ENCOUNTER
From overdue results-lab due. Spoke to patient that A1C is due anytime a week before 3/17 ov. Verbalized understanding.

## 2020-03-14 LAB — HBA1C MFR BLD: 7.9 % OF TOTAL HGB

## 2020-03-16 ENCOUNTER — TELEPHONE (OUTPATIENT)
Dept: FAMILY MEDICINE | Facility: CLINIC | Age: 65
End: 2020-03-16

## 2020-03-16 NOTE — TELEPHONE ENCOUNTER
Spoke to patient who says she takes the ozempic daily and has no problem getting it. New message?/ba

## 2020-03-16 NOTE — PROGRESS NOTES
Please call pt and let her know diabetes test A1C remains elevated at 7.9%- ask if she's been taking the ozempic weekly? If not I strongly recommend restarting ozempic 0.5mg weekly- if she cannot afford then recommend increasing glipizide to 5mg BID though caution her on risk of hypoglycemia with glipizide.

## 2020-03-16 NOTE — TELEPHONE ENCOUNTER
If she's been taking the ozempic 0.5mg then recommend increasing to 1mg weekly- I will send in new RX for the higher dose pen

## 2020-03-16 NOTE — TELEPHONE ENCOUNTER
Spoke with pt and let her know that we are cancelling her appointment for tomorrow. Agrees to telemedicine but cannot remember her email address. Will call her daughter and then give us a call back.

## 2020-03-16 NOTE — TELEPHONE ENCOUNTER
----- Message from Priyanka Garibay NP sent at 3/16/2020  9:58 AM CDT -----  Please call pt and let her know diabetes test A1C remains elevated at 7.9%- ask if she's been taking the ozempic weekly? If not I strongly recommend restarting ozempic 0.5mg weekly- if she cannot afford then recommend increasing glipizide to 5mg BID though caution her on risk of hypoglycemia with glipizide.

## 2020-04-03 ENCOUNTER — TELEPHONE (OUTPATIENT)
Dept: FAMILY MEDICINE | Facility: CLINIC | Age: 65
End: 2020-04-03

## 2020-04-03 NOTE — TELEPHONE ENCOUNTER
Spoke with pt she states her daughter isnt home right now to bring her in for an appt. She will have her daughter bring her to UC if she doesn't come back before we close.

## 2020-04-03 NOTE — TELEPHONE ENCOUNTER
Please call her- if she's having vaginal itching and burning it may be a yeast infection and not a UTI. Does it burn only when she urinates? Any fever/chills? Vaginal discharge or blood in urine?

## 2020-04-03 NOTE — TELEPHONE ENCOUNTER
Spoke with pt, she says she has itching and she has a boil to the area. Pt states she will go to UC

## 2020-04-03 NOTE — TELEPHONE ENCOUNTER
----- Message from Janiya Powell MA sent at 4/3/2020  8:33 AM CDT -----  Pt called in to report she has a UTI, and would like an antibiotic called in.  Symptoms: burning and itching in vaginal area.    Pharmacy - [facility]    Pt - [phone]

## 2020-04-20 ENCOUNTER — TELEPHONE (OUTPATIENT)
Dept: FAMILY MEDICINE | Facility: CLINIC | Age: 65
End: 2020-04-20

## 2020-04-20 NOTE — TELEPHONE ENCOUNTER
Spoke to pt to see about getting her rescheduled. Sent link to pts cell phone. She stated she would get her daughter to help her set up pt portal. Once active she will give us a call to let us know, so we can schedule her for a virtual visit with Priyanka one day this week

## 2020-04-21 ENCOUNTER — TELEPHONE (OUTPATIENT)
Dept: FAMILY MEDICINE | Facility: CLINIC | Age: 65
End: 2020-04-21

## 2020-04-22 ENCOUNTER — TELEPHONE (OUTPATIENT)
Dept: FAMILY MEDICINE | Facility: CLINIC | Age: 65
End: 2020-04-22

## 2020-04-22 ENCOUNTER — OFFICE VISIT (OUTPATIENT)
Dept: FAMILY MEDICINE | Facility: CLINIC | Age: 65
End: 2020-04-22
Payer: MEDICARE

## 2020-04-22 DIAGNOSIS — E78.5 DM TYPE 2 WITH DIABETIC DYSLIPIDEMIA: Primary | ICD-10-CM

## 2020-04-22 DIAGNOSIS — I10 ESSENTIAL HYPERTENSION: ICD-10-CM

## 2020-04-22 DIAGNOSIS — E11.69 DM TYPE 2 WITH DIABETIC DYSLIPIDEMIA: Primary | ICD-10-CM

## 2020-04-22 DIAGNOSIS — E78.5 DYSLIPIDEMIA: ICD-10-CM

## 2020-04-22 DIAGNOSIS — G56.03 BILATERAL CARPAL TUNNEL SYNDROME: ICD-10-CM

## 2020-04-22 PROCEDURE — 1101F PT FALLS ASSESS-DOCD LE1/YR: CPT | Mod: 95,,, | Performed by: NURSE PRACTITIONER

## 2020-04-22 PROCEDURE — 3051F HG A1C>EQUAL 7.0%<8.0%: CPT | Mod: 95,,, | Performed by: NURSE PRACTITIONER

## 2020-04-22 PROCEDURE — 99213 OFFICE O/P EST LOW 20 MIN: CPT | Mod: 95,,, | Performed by: NURSE PRACTITIONER

## 2020-04-22 PROCEDURE — 1159F PR MEDICATION LIST DOCUMENTED IN MEDICAL RECORD: ICD-10-PCS | Mod: 95,,, | Performed by: NURSE PRACTITIONER

## 2020-04-22 PROCEDURE — 99213 PR OFFICE/OUTPT VISIT, EST, LEVL III, 20-29 MIN: ICD-10-PCS | Mod: 95,,, | Performed by: NURSE PRACTITIONER

## 2020-04-22 PROCEDURE — 1159F MED LIST DOCD IN RCRD: CPT | Mod: 95,,, | Performed by: NURSE PRACTITIONER

## 2020-04-22 PROCEDURE — 3051F PR MOST RECENT HEMOGLOBIN A1C LEVEL 7.0 - < 8.0%: ICD-10-PCS | Mod: 95,,, | Performed by: NURSE PRACTITIONER

## 2020-04-22 PROCEDURE — 1101F PR PT FALLS ASSESS DOC 0-1 FALLS W/OUT INJ PAST YR: ICD-10-PCS | Mod: 95,,, | Performed by: NURSE PRACTITIONER

## 2020-04-22 NOTE — TELEPHONE ENCOUNTER
----- Message from Priyanka Garibay NP sent at 4/22/2020  9:22 AM CDT -----  Needs f/u appt in 3 mos, labs before next visit

## 2020-04-22 NOTE — PROGRESS NOTES
Subjective:        The chief complaint leading to consultation is: diabetes f/u  The patient location is:  Home  Visit type: Virtual visit with synchronous audio/video or audio only  This was a video visit in lieu of in-person visit due to the coronavirus emergency. Patient acknowledged and consented to the video visit encounter.     Pt reports overall doing okay. No new c/o's. Continues with numbness/tingling to bilat hands- mainly first 3 fingers. Has wrist splint though only wears occasionally- denies any loss of hand strength or dropping things.  Reports FBS 140s- states back on ozempic 0.5mg weekly in addition to glipizide and metformin- denies any hypoglycemia    Admission on 01/16/2020, Discharged on 01/16/2020   Component Date Value Ref Range Status    Specimen UA 01/16/2020 Urine, Clean Catch   Final    Color, UA 01/16/2020 Yellow  Yellow, Straw, [place] Final    Appearance, UA 01/16/2020 Clear  Clear Final    pH, UA 01/16/2020 8.0  5.0 - 8.0 Final    Specific Gravity, UA 01/16/2020 1.015  1.005 - 1.030 Final    Protein, UA 01/16/2020 Negative  Negative Final    Glucose, UA 01/16/2020 Negative  Negative Final    Ketones, UA 01/16/2020 Negative  Negative Final    Bilirubin (UA) 01/16/2020 Negative  Negative Final    Occult Blood UA 01/16/2020 Negative  Negative Final    Nitrite, UA 01/16/2020 Negative  Negative Final    Urobilinogen, UA 01/16/2020 1.0  <2.0 EU/dL Final    Leukocytes, UA 01/16/2020 1+* Negative Final    WBC 01/16/2020 10.68  3.90 - 12.70 K/uL Final    RBC 01/16/2020 5.16  4.00 - 5.40 M/uL Final    Hemoglobin 01/16/2020 11.2* 12.0 - 16.0 g/dL Final    Hematocrit 01/16/2020 34.5* 37.0 - 48.5 % Final    Mean Corpuscular Volume 01/16/2020 67* 82 - 98 fL Final    Mean Corpuscular Hemoglobin 01/16/2020 21.7* 27.0 - 31.0 pg Final    Mean Corpuscular Hemoglobin Conc 01/16/2020 32.5  32.0 - 36.0 g/dL Final    RDW 01/16/2020 14.8* 11.5 - 14.5 % Final    Platelets 01/16/2020 357*  150 - 350 K/uL Final    MPV 01/16/2020 9.9  9.2 - 12.9 fL Final    Gran # (ANC) 01/16/2020 7.1  1.8 - 7.7 K/uL Final    Immature Grans (Abs) 01/16/2020 0.03  0.00 - 0.04 K/uL Final    Lymph # 01/16/2020 2.4  1.0 - 4.8 K/uL Final    Mono # 01/16/2020 0.8  0.3 - 1.0 K/uL Final    Eos # 01/16/2020 0.2  0.0 - 0.5 K/uL Final    Baso # 01/16/2020 0.04  0.00 - 0.20 K/uL Final    nRBC 01/16/2020 0  0 /100 WBC Final    Gran% 01/16/2020 66.6  38.0 - 73.0 % Final    Lymph% 01/16/2020 22.8  18.0 - 48.0 % Final    Mono% 01/16/2020 7.9  4.0 - 15.0 % Final    Eosinophil% 01/16/2020 2.0  0.0 - 8.0 % Final    Basophil% 01/16/2020 0.4  0.0 - 1.9 % Final    Differential Method 01/16/2020 Automated   Final    Sodium 01/16/2020 142  136 - 145 mmol/L Final    Potassium 01/16/2020 4.1  3.5 - 5.1 mmol/L Final    Chloride 01/16/2020 106  95 - 110 mmol/L Final    CO2 01/16/2020 26  23 - 29 mmol/L Final    Glucose 01/16/2020 131* 70 - 110 mg/dL Final    BUN, Bld 01/16/2020 13  8 - 23 mg/dL Final    Creatinine 01/16/2020 1.0  0.5 - 1.4 mg/dL Final    Calcium 01/16/2020 9.8  8.7 - 10.5 mg/dL Final    Anion Gap 01/16/2020 10  8 - 16 mmol/L Final    eGFR if African American 01/16/2020 >60  >60 mL/min/1.73 m^2 Final    eGFR if non African American 01/16/2020 59* >60 mL/min/1.73 m^2 Final    Troponin I 01/16/2020 <0.006  0.000 - 0.026 ng/mL Final    RBC, UA 01/16/2020 1  0 - 4 /hpf Final    WBC, UA 01/16/2020 15* 0 - 5 /hpf Final    Bacteria 01/16/2020 Few* None-Occ /hpf Final    Microscopic Comment 01/16/2020 SEE COMMENT   Final    Urine Culture, Routine 01/16/2020 No significant growth   Final   Office Visit on 12/17/2019   Component Date Value Ref Range Status    Hemoglobin A1C 03/13/2020 7.9* <5.7 % of total Hgb Final   Telephone on 12/05/2019   Component Date Value Ref Range Status    Glucose 12/11/2019 126* 65 - 99 mg/dL Final    BUN, Bld 12/11/2019 21  7 - 25 mg/dL Final    Creatinine 12/11/2019 1.11* 0.50  - 0.99 mg/dL Final    eGFR if non African American 12/11/2019 52* > OR = 60 mL/min/1.73m2 Final    eGFR if African American 12/11/2019 60  > OR = 60 mL/min/1.73m2 Final    BUN/Creatinine Ratio 12/11/2019 19  6 - 22 (calc) Final    Sodium 12/11/2019 142  135 - 146 mmol/L Final    Potassium 12/11/2019 4.5  3.5 - 5.3 mmol/L Final    Chloride 12/11/2019 106  98 - 110 mmol/L Final    CO2 12/11/2019 30  20 - 32 mmol/L Final    Calcium 12/11/2019 9.2  8.6 - 10.4 mg/dL Final    Total Protein 12/11/2019 6.5  6.1 - 8.1 g/dL Final    Albumin 12/11/2019 3.9  3.6 - 5.1 g/dL Final    Globulin, Total 12/11/2019 2.6  1.9 - 3.7 g/dL (calc) Final    Albumin/Globulin Ratio 12/11/2019 1.5  1.0 - 2.5 (calc) Final    Total Bilirubin 12/11/2019 0.3  0.2 - 1.2 mg/dL Final    Alkaline Phosphatase 12/11/2019 89  33 - 130 U/L Final    AST 12/11/2019 12  10 - 35 U/L Final    ALT 12/11/2019 11  6 - 29 U/L Final    Cholesterol 12/11/2019 125  <200 mg/dL Final    HDL 12/11/2019 34* >50 mg/dL Final    Triglycerides 12/11/2019 98  <150 mg/dL Final    LDL Cholesterol 12/11/2019 73  mg/dL (calc) Final    Hdl/Cholesterol Ratio 12/11/2019 3.7  <5.0 (calc) Final    Non HDL Chol. (LDL+VLDL) 12/11/2019 91  <130 mg/dL (calc) Final       History reviewed. No pertinent surgical history.  Past Medical History:   Diagnosis Date    DDD (degenerative disc disease), lumbar     DM type 2 with diabetic dyslipidemia     Dyslipidemia     GERD (gastroesophageal reflux disease)      History reviewed. No pertinent family history.     Social History:   Marital Status:   Alcohol History:  reports that she does not drink alcohol.  Tobacco History:  reports that she has never smoked. She has never used smokeless tobacco.  Drug History:  reports that she does not use drugs.    Review of patient's allergies indicates:  No Known Allergies    Current Outpatient Medications   Medication Sig Dispense Refill    amLODIPine (NORVASC) 5 MG tablet  Take 1 tablet (5 mg total) by mouth once daily. 90 tablet 1    aspirin 325 MG tablet Take 325 mg by mouth once daily.       calcium-vitamin D tablet 600 mg-200 units Take 1 tablet by mouth once daily.       cholecalciferol, vitamin D3, (VITAMIN D3) 2,000 unit Cap Take 1 capsule by mouth once daily.       glipiZIDE (GLUCOTROL) 5 MG tablet Take 1 tablet (5 mg total) by mouth daily with breakfast. 90 tablet 1    lisinopril-hydrochlorothiazide (PRINZIDE,ZESTORETIC) 20-12.5 mg per tablet Take 1 tablet by mouth once daily. 90 tablet 1    meloxicam (MOBIC) 15 MG tablet Take 1 tablet (15 mg total) by mouth once daily. 90 tablet 1    metFORMIN (GLUCOPHAGE) 1000 MG tablet Take 1 tablet (1,000 mg total) by mouth 2 (two) times daily with meals. 180 tablet 1    omeprazole (PRILOSEC) 20 MG capsule Take 1 capsule (20 mg total) by mouth once daily. 90 capsule 1    pravastatin (PRAVACHOL) 40 MG tablet Take 1 tablet (40 mg total) by mouth once daily. For cholesterol. 90 tablet 1    tiZANidine (ZANAFLEX) 4 MG tablet Take 1 tablet (4 mg total) by mouth nightly as needed. 90 tablet 1    semaglutide (OZEMPIC) 1 mg/dose (2 mg/1.5 mL) PnIj Inject 0.75 mLs into the skin once a week. 6 Syringe 1     No current facility-administered medications for this visit.        Review of Systems   Constitutional: Negative for chills and fever.   HENT: Negative for sore throat.    Respiratory: Negative for cough, shortness of breath and wheezing.    Cardiovascular: Negative for chest pain, palpitations and leg swelling.   Gastrointestinal: Negative for abdominal pain, constipation and diarrhea.   Genitourinary: Negative for dysuria and hematuria.   Musculoskeletal: Positive for arthralgias (left shoulder pain intermittently).   Skin: Negative for rash.   Neurological: Positive for numbness (bilat fingertips 1st-3rd fingers). Negative for dizziness and headaches.   Psychiatric/Behavioral: Negative for dysphoric mood.         Objective:         Physical Exam:   Physical Exam   Constitutional: She is oriented to person, place, and time. She appears well-developed and well-nourished.   Neurological: She is alert and oriented to person, place, and time.   Psychiatric: She has a normal mood and affect.            Assessment:       1. DM type 2 with diabetic dyslipidemia    2. Dyslipidemia    3. Essential hypertension    4. Bilateral carpal tunnel syndrome      Plan:   DM type 2 with diabetic dyslipidemia  -     semaglutide (OZEMPIC) 1 mg/dose (2 mg/1.5 mL) PnIj; Inject 0.75 mLs into the skin once a week.  Dispense: 6 Syringe; Refill: 1  -     Hemoglobin A1c; Future; Expected date: 04/22/2020  -     Lipid panel; Future; Expected date: 04/22/2020  -     Comprehensive metabolic panel; Future; Expected date: 04/22/2020  -     TSH w/reflex to FT4; Future; Expected date: 04/22/2020  -     Microalbumin/creatinine urine ratio; Future; Expected date: 04/22/2020  -     Urinalysis, Reflex to Urine Culture Urine, Clean Catch; Future; Expected date: 04/22/2020  -uncontrolled with A1C 7.9% last month- advised will increase ozempic to 1mg weekly and continue monitoring blood sugars. Encouraged diabetic diet with reduction in carbs    Dyslipidemia  -stable    Essential hypertension  -doesn't have BP monitor at home- I recommend she get a BP machine to monitor given elevated BP readings- recheck on next visit    Bilateral carpal tunnel syndrome  -advised if symptoms worsening or she develops weak hand grasp recommend ortho referral for CTS surgery. She states she doesn't want to have surgery so encouraged her to wear wrist splints nightly    Follow up in about 3 months (around 7/22/2020) for Diabetes, Labs before next visit.    Total time spent with patient: 18    Each patient to whom he or she provides medical services by telemedicine is:  (1) informed of the relationship between the physician and patient and the respective role of any other health care provider with  respect to management of the patient; and (2) notified that he or she may decline to receive medical services by telemedicine and may withdraw from such care at any time.    This note was created using [employer] voice recognition software that occasionally misinterprets phrases or words.

## 2020-05-12 ENCOUNTER — TELEPHONE (OUTPATIENT)
Dept: FAMILY MEDICINE | Facility: CLINIC | Age: 65
End: 2020-05-12

## 2020-07-15 ENCOUNTER — TELEPHONE (OUTPATIENT)
Dept: FAMILY MEDICINE | Facility: CLINIC | Age: 65
End: 2020-07-15

## 2020-07-15 NOTE — TELEPHONE ENCOUNTER
Spoke to pt regarding her appt on 7/22. Offered virtual visit pt refused wants in office appt. Pt advised to wear mask//call upon arrival

## 2020-07-20 ENCOUNTER — TELEPHONE (OUTPATIENT)
Dept: FAMILY MEDICINE | Facility: CLINIC | Age: 65
End: 2020-07-20

## 2020-07-20 NOTE — TELEPHONE ENCOUNTER
Spoke to pt to let her know that she is due to have fasting labs before her next office visit 07/22/2020 at 8:40 AM. Pt stated she had labs done today

## 2020-07-21 ENCOUNTER — TELEPHONE (OUTPATIENT)
Dept: FAMILY MEDICINE | Facility: CLINIC | Age: 65
End: 2020-07-21

## 2020-07-21 NOTE — TELEPHONE ENCOUNTER
Spoke to pt regarding message below. Pt stated she's had a virtual visit before. I was able to switch pts appt to virtual visit

## 2020-07-21 NOTE — TELEPHONE ENCOUNTER
----- Message from Mary Ann Cooper sent at 7/21/2020 12:22 PM CDT -----  Regarding: NEEDS CALL BACK FROM NURSE  Contact: Rhonda Mojica  Pt says that she won't be alble to make it to the office in the morning, she would like to know cqn it be switched to a virtual ?   Pt# [phone]

## 2020-07-21 NOTE — TELEPHONE ENCOUNTER
----- Message from Kelly Goldman sent at 7/21/2020 12:34 PM CDT -----  Pt would like to change her dex appt to a virtual due to transportation. Cb # [phone]

## 2020-07-22 ENCOUNTER — OFFICE VISIT (OUTPATIENT)
Dept: FAMILY MEDICINE | Facility: CLINIC | Age: 65
End: 2020-07-22

## 2020-07-22 DIAGNOSIS — E11.69 DM TYPE 2 WITH DIABETIC DYSLIPIDEMIA: Primary | ICD-10-CM

## 2020-07-22 DIAGNOSIS — Z12.31 SCREENING MAMMOGRAM, ENCOUNTER FOR: ICD-10-CM

## 2020-07-22 DIAGNOSIS — E78.5 DM TYPE 2 WITH DIABETIC DYSLIPIDEMIA: Primary | ICD-10-CM

## 2020-07-22 DIAGNOSIS — M51.36 DDD (DEGENERATIVE DISC DISEASE), LUMBAR: ICD-10-CM

## 2020-07-22 DIAGNOSIS — Z12.11 COLON CANCER SCREENING: ICD-10-CM

## 2020-07-22 DIAGNOSIS — I10 ESSENTIAL HYPERTENSION: ICD-10-CM

## 2020-07-22 DIAGNOSIS — E78.5 DYSLIPIDEMIA: ICD-10-CM

## 2020-07-22 LAB
ALBUMIN SERPL-MCNC: 3.9 G/DL (ref 3.6–5.1)
ALBUMIN/CREAT UR: 4 MCG/MG CREAT
ALBUMIN/GLOB SERPL: 1.4 (CALC) (ref 1–2.5)
ALP SERPL-CCNC: 88 U/L (ref 37–153)
ALT SERPL-CCNC: 10 U/L (ref 6–29)
APPEARANCE UR: CLEAR
AST SERPL-CCNC: 13 U/L (ref 10–35)
BACTERIA #/AREA URNS HPF: ABNORMAL /HPF
BACTERIA UR CULT: ABNORMAL
BACTERIA UR CULT: NORMAL
BILIRUB SERPL-MCNC: 0.3 MG/DL (ref 0.2–1.2)
BILIRUB UR QL STRIP: NEGATIVE
BUN SERPL-MCNC: 19 MG/DL (ref 7–25)
BUN/CREAT SERPL: 18 (CALC) (ref 6–22)
CALCIUM SERPL-MCNC: 9.5 MG/DL (ref 8.6–10.4)
CHLORIDE SERPL-SCNC: 105 MMOL/L (ref 98–110)
CHOLEST SERPL-MCNC: 134 MG/DL
CHOLEST/HDLC SERPL: 3.7 (CALC)
CO2 SERPL-SCNC: 30 MMOL/L (ref 20–32)
COLOR UR: YELLOW
CREAT SERPL-MCNC: 1.06 MG/DL (ref 0.5–0.99)
CREAT UR-MCNC: 80 MG/DL (ref 20–275)
GFRSERPLBLD MDRD-ARVRAT: 55 ML/MIN/1.73M2
GLOBULIN SER CALC-MCNC: 2.7 G/DL (CALC) (ref 1.9–3.7)
GLUCOSE SERPL-MCNC: 137 MG/DL (ref 65–99)
GLUCOSE UR QL STRIP: NEGATIVE
HBA1C MFR BLD: 7.1 % OF TOTAL HGB
HDLC SERPL-MCNC: 36 MG/DL
HGB UR QL STRIP: NEGATIVE
HYALINE CASTS #/AREA URNS LPF: ABNORMAL /LPF
KETONES UR QL STRIP: NEGATIVE
LDLC SERPL CALC-MCNC: 76 MG/DL (CALC)
LEUKOCYTE ESTERASE UR QL STRIP: ABNORMAL
MICROALBUMIN UR-MCNC: 0.3 MG/DL
NITRITE UR QL STRIP: NEGATIVE
NONHDLC SERPL-MCNC: 98 MG/DL (CALC)
PH UR STRIP: 7 [PH] (ref 5–8)
POTASSIUM SERPL-SCNC: 4.7 MMOL/L (ref 3.5–5.3)
PROT SERPL-MCNC: 6.6 G/DL (ref 6.1–8.1)
PROT UR QL STRIP: NEGATIVE
RBC #/AREA URNS HPF: ABNORMAL /HPF
SODIUM SERPL-SCNC: 140 MMOL/L (ref 135–146)
SP GR UR STRIP: 1.01 (ref 1–1.03)
SQUAMOUS #/AREA URNS HPF: ABNORMAL /HPF
TRIGL SERPL-MCNC: 134 MG/DL
TSH SERPL-ACNC: 2.17 MIU/L (ref 0.4–4.5)
WBC #/AREA URNS HPF: ABNORMAL /HPF

## 2020-07-22 PROCEDURE — 1101F PR PT FALLS ASSESS DOC 0-1 FALLS W/OUT INJ PAST YR: ICD-10-PCS | Mod: 95,,, | Performed by: NURSE PRACTITIONER

## 2020-07-22 PROCEDURE — 3051F HG A1C>EQUAL 7.0%<8.0%: CPT | Mod: 95,,, | Performed by: NURSE PRACTITIONER

## 2020-07-22 PROCEDURE — 99213 OFFICE O/P EST LOW 20 MIN: CPT | Mod: 95,,, | Performed by: NURSE PRACTITIONER

## 2020-07-22 PROCEDURE — 99213 PR OFFICE/OUTPT VISIT, EST, LEVL III, 20-29 MIN: ICD-10-PCS | Mod: 95,,, | Performed by: NURSE PRACTITIONER

## 2020-07-22 PROCEDURE — 3051F PR MOST RECENT HEMOGLOBIN A1C LEVEL 7.0 - < 8.0%: ICD-10-PCS | Mod: 95,,, | Performed by: NURSE PRACTITIONER

## 2020-07-22 PROCEDURE — 1159F MED LIST DOCD IN RCRD: CPT | Mod: 95,,, | Performed by: NURSE PRACTITIONER

## 2020-07-22 PROCEDURE — 1101F PT FALLS ASSESS-DOCD LE1/YR: CPT | Mod: 95,,, | Performed by: NURSE PRACTITIONER

## 2020-07-22 PROCEDURE — 1159F PR MEDICATION LIST DOCUMENTED IN MEDICAL RECORD: ICD-10-PCS | Mod: 95,,, | Performed by: NURSE PRACTITIONER

## 2020-07-22 NOTE — PROGRESS NOTES
"Answers for HPI/ROS submitted by the patient on 7/21/2020   activity change: No  unexpected weight change: No  neck pain: No  hearing loss: No  rhinorrhea: No  trouble swallowing: No  eye discharge: No  visual disturbance: No  chest tightness: No  wheezing: No  chest pain: No  palpitations: No  blood in stool: No  constipation: No  vomiting: No  diarrhea: No  polydipsia: No  polyuria: No  difficulty urinating: No  hematuria: No  menstrual problem: No  dysuria: No  joint swelling: No  arthralgias: Yes  headaches: No  weakness: No  confusion: No  dysphoric mood: No      Subjective:        The chief complaint leading to consultation is: DM f/u  The patient location is:  Home  Visit type: Virtual visit with synchronous audio/video or audio only  This was a video visit in lieu of in-person visit due to the coronavirus emergency. Patient acknowledged and consented to the video visit encounter.     Pt reports overall doing well since last visit. FBS ranging 120-140s.  C/o's of "aches and pains" though overall stable on meloxicam, will take tizanidine occas at bedtime.  Staying at home d/t COVID19 so hasn't been very physically active. Denies any weight gain      Office Visit on 04/22/2020   Component Date Value Ref Range Status    Hemoglobin A1C 07/20/2020 7.1* <5.7 % of total Hgb Final    Cholesterol 07/20/2020 134  <200 mg/dL Final    HDL 07/20/2020 36* > OR = 50 mg/dL Final    Triglycerides 07/20/2020 134  <150 mg/dL Final    LDL Cholesterol 07/20/2020 76  mg/dL (calc) Final    Hdl/Cholesterol Ratio 07/20/2020 3.7  <5.0 (calc) Final    Non HDL Chol. (LDL+VLDL) 07/20/2020 98  <130 mg/dL (calc) Final    Glucose 07/20/2020 137* 65 - 99 mg/dL Final    BUN, Bld 07/20/2020 19  7 - 25 mg/dL Final    Creatinine 07/20/2020 1.06* 0.50 - 0.99 mg/dL Final    eGFR if non African American 07/20/2020 55* > OR = 60 mL/min/1.73m2 Final    eGFR if  07/20/2020 63  > OR = 60 mL/min/1.73m2 Final    " BUN/Creatinine Ratio 07/20/2020 18  6 - 22 (calc) Final    Sodium 07/20/2020 140  135 - 146 mmol/L Final    Potassium 07/20/2020 4.7  3.5 - 5.3 mmol/L Final    Chloride 07/20/2020 105  98 - 110 mmol/L Final    CO2 07/20/2020 30  20 - 32 mmol/L Final    Calcium 07/20/2020 9.5  8.6 - 10.4 mg/dL Final    Total Protein 07/20/2020 6.6  6.1 - 8.1 g/dL Final    Albumin 07/20/2020 3.9  3.6 - 5.1 g/dL Final    Globulin, Total 07/20/2020 2.7  1.9 - 3.7 g/dL (calc) Final    Albumin/Globulin Ratio 07/20/2020 1.4  1.0 - 2.5 (calc) Final    Total Bilirubin 07/20/2020 0.3  0.2 - 1.2 mg/dL Final    Alkaline Phosphatase 07/20/2020 88  37 - 153 U/L Final    AST 07/20/2020 13  10 - 35 U/L Final    ALT 07/20/2020 10  6 - 29 U/L Final    TSH w/reflex to FT4 07/20/2020 2.17  0.40 - 4.50 mIU/L Final    Creatinine, Random Ur 07/20/2020 80  20 - 275 mg/dL Final    Microalb, Ur 07/20/2020 0.3  See Note: mg/dL Final    Microalb Creat Ratio 07/20/2020 4  <30 mcg/mg creat Final    Color, UA 07/20/2020 YELLOW  YELLOW Final    Appearance, UA 07/20/2020 CLEAR  CLEAR Final    Specific Gravity, UA 07/20/2020 1.015  1.001 - 1.035 Final    pH, UA 07/20/2020 7.0  5.0 - 8.0 Final    Glucose, UA 07/20/2020 NEGATIVE  NEGATIVE Final    Bilirubin, UA 07/20/2020 NEGATIVE  NEGATIVE Final    Ketones, UA 07/20/2020 NEGATIVE  NEGATIVE Final    Occult Blood UA 07/20/2020 NEGATIVE  NEGATIVE Final    Protein, UA 07/20/2020 NEGATIVE  NEGATIVE Final    Nitrite, UA 07/20/2020 NEGATIVE  NEGATIVE Final    Leukocytes, UA 07/20/2020 2+* NEGATIVE Final    WBC Casts, UA 07/20/2020 6-10* < OR = 5 /HPF Final    RBC Casts, UA 07/20/2020 NONE SEEN  < OR = 2 /HPF Final    Squam Epithel, UA 07/20/2020 0-5  < OR = 5 /HPF Final    Bacteria, UA 07/20/2020 NONE SEEN  NONE SEEN /HPF Final    Hyaline Casts, UA 07/20/2020 NONE SEEN  NONE SEEN /LPF Final    Reflexive Urine Culture 07/20/2020 CULTURE INDICATED - RESULTS TO FOLLOW   Final    Urine  Culture, Routine 07/20/2020    Final       History reviewed. No pertinent surgical history.  Past Medical History:   Diagnosis Date    DDD (degenerative disc disease), lumbar     DM type 2 with diabetic dyslipidemia     Dyslipidemia     GERD (gastroesophageal reflux disease)      History reviewed. No pertinent family history.     Social History:   Marital Status:   Alcohol History:  reports no history of alcohol use.  Tobacco History:  reports that she has never smoked. She has never used smokeless tobacco.  Drug History:  reports no history of drug use.    Review of patient's allergies indicates:  No Known Allergies    Current Outpatient Medications   Medication Sig Dispense Refill    amLODIPine (NORVASC) 5 MG tablet Take 1 tablet (5 mg total) by mouth once daily. 90 tablet 1    aspirin 325 MG tablet Take 325 mg by mouth once daily.       calcium-vitamin D tablet 600 mg-200 units Take 1 tablet by mouth once daily.       cholecalciferol, vitamin D3, (VITAMIN D3) 2,000 unit Cap Take 1 capsule by mouth once daily.       glipiZIDE (GLUCOTROL) 5 MG tablet Take 1 tablet (5 mg total) by mouth daily with breakfast. 90 tablet 1    lisinopril-hydrochlorothiazide (PRINZIDE,ZESTORETIC) 20-12.5 mg per tablet Take 1 tablet by mouth once daily. 90 tablet 1    meloxicam (MOBIC) 15 MG tablet Take 1 tablet (15 mg total) by mouth once daily. 90 tablet 1    metFORMIN (GLUCOPHAGE) 1000 MG tablet Take 1 tablet (1,000 mg total) by mouth 2 (two) times daily with meals. 180 tablet 1    omeprazole (PRILOSEC) 20 MG capsule Take 1 capsule (20 mg total) by mouth once daily. 90 capsule 1    pravastatin (PRAVACHOL) 40 MG tablet Take 1 tablet (40 mg total) by mouth once daily. For cholesterol. 90 tablet 1    semaglutide (OZEMPIC) 1 mg/dose (2 mg/1.5 mL) PnIj Inject 0.75 mLs into the skin once a week. 6 Syringe 1    tiZANidine (ZANAFLEX) 4 MG tablet Take 1 tablet (4 mg total) by mouth nightly as needed. 90 tablet 1     No  "current facility-administered medications for this visit.        Review of Systems   Constitutional: Negative for activity change and unexpected weight change.   HENT: Negative for hearing loss, rhinorrhea and trouble swallowing.    Eyes: Negative for discharge and visual disturbance.   Respiratory: Negative for cough, chest tightness, shortness of breath and wheezing.    Cardiovascular: Negative for chest pain and palpitations.   Gastrointestinal: Negative for blood in stool, constipation, diarrhea and vomiting.   Endocrine: Negative for polydipsia and polyuria.   Genitourinary: Negative for difficulty urinating, dysuria, hematuria and menstrual problem.   Musculoskeletal: Positive for arthralgias ("aches and pains", stable). Negative for joint swelling and neck pain.   Neurological: Negative for weakness, numbness and headaches.   Psychiatric/Behavioral: Negative for confusion and dysphoric mood.         Objective:        Physical Exam:   Physical Exam  Constitutional:       General: She is not in acute distress.     Appearance: Normal appearance.   Pulmonary:      Effort: Pulmonary effort is normal.   Neurological:      General: No focal deficit present.      Mental Status: She is alert and oriented to person, place, and time.   Psychiatric:         Mood and Affect: Mood normal.              Assessment:       1. DM type 2 with diabetic dyslipidemia    2. Dyslipidemia    3. Essential hypertension    4. DDD (degenerative disc disease), lumbar    5. Colon cancer screening    6. Screening mammogram, encounter for      Plan:   DM type 2 with diabetic dyslipidemia  Comments:  reviewed recent labs, A1C improved to 7.1%  Orders:  -     Hemoglobin A1C; Future; Expected date: 07/22/2020    Dyslipidemia  Comments:  well controlled    Essential hypertension  Comments:  pt reports does not have BP monitor at home- encouraged to get one so she can monitor occas with goal BP <140/90    DDD (degenerative disc disease), " lumbar  Comments:  stable, takes muscle relaxer occas    Colon cancer screening  Comments:  pt reports last cscope at [facility] they were unable to complete d/t turtous colon, recommend cologuard  Orders:  -     Cologuard Screening (Multitarget Stool DNA); Future; Expected date: 07/22/2020    Screening mammogram, encounter for  -     Mammo Digital Screening Bilat; Future; Expected date: 07/29/2020      Follow up in about 4 months (around 11/22/2020) for Diabetes, Labs before next visit.    Total time spent with patient: 15    Each patient to whom he or she provides medical services by telemedicine is:  (1) informed of the relationship between the physician and patient and the respective role of any other health care provider with respect to management of the patient; and (2) notified that he or she may decline to receive medical services by telemedicine and may withdraw from such care at any time.    This note was created using [employer] voice recognition software that occasionally misinterprets phrases or words.

## 2020-08-11 ENCOUNTER — TELEPHONE (OUTPATIENT)
Dept: FAMILY MEDICINE | Facility: CLINIC | Age: 65
End: 2020-08-11

## 2020-08-11 NOTE — TELEPHONE ENCOUNTER
----- Message from Kelly Goldman sent at 8/11/2020  2:06 PM CDT -----  Pt wants to have a repeat COVID test to confirm it is gone. Pt denies any current symptoms    # [phone]

## 2020-08-13 ENCOUNTER — OFFICE VISIT (OUTPATIENT)
Dept: PRIMARY CARE CLINIC | Facility: CLINIC | Age: 65
End: 2020-08-13
Payer: MEDICARE

## 2020-08-13 VITALS
DIASTOLIC BLOOD PRESSURE: 86 MMHG | TEMPERATURE: 98 F | RESPIRATION RATE: 16 BRPM | SYSTOLIC BLOOD PRESSURE: 200 MMHG | HEART RATE: 89 BPM | OXYGEN SATURATION: 99 %

## 2020-08-13 DIAGNOSIS — Z20.822 SUSPECTED COVID-19 VIRUS INFECTION: Primary | ICD-10-CM

## 2020-08-13 PROCEDURE — 1159F PR MEDICATION LIST DOCUMENTED IN MEDICAL RECORD: ICD-10-PCS | Mod: S$GLB,,, | Performed by: PHYSICIAN ASSISTANT

## 2020-08-13 PROCEDURE — 3077F SYST BP >= 140 MM HG: CPT | Mod: CPTII,S$GLB,, | Performed by: PHYSICIAN ASSISTANT

## 2020-08-13 PROCEDURE — 1159F MED LIST DOCD IN RCRD: CPT | Mod: S$GLB,,, | Performed by: PHYSICIAN ASSISTANT

## 2020-08-13 PROCEDURE — 1101F PR PT FALLS ASSESS DOC 0-1 FALLS W/OUT INJ PAST YR: ICD-10-PCS | Mod: CPTII,S$GLB,, | Performed by: PHYSICIAN ASSISTANT

## 2020-08-13 PROCEDURE — 3077F PR MOST RECENT SYSTOLIC BLOOD PRESSURE >= 140 MM HG: ICD-10-PCS | Mod: CPTII,S$GLB,, | Performed by: PHYSICIAN ASSISTANT

## 2020-08-13 PROCEDURE — 3079F PR MOST RECENT DIASTOLIC BLOOD PRESSURE 80-89 MM HG: ICD-10-PCS | Mod: CPTII,S$GLB,, | Performed by: PHYSICIAN ASSISTANT

## 2020-08-13 PROCEDURE — 1101F PT FALLS ASSESS-DOCD LE1/YR: CPT | Mod: CPTII,S$GLB,, | Performed by: PHYSICIAN ASSISTANT

## 2020-08-13 PROCEDURE — 99203 OFFICE O/P NEW LOW 30 MIN: CPT | Mod: S$GLB,,, | Performed by: PHYSICIAN ASSISTANT

## 2020-08-13 PROCEDURE — 99203 PR OFFICE/OUTPT VISIT, NEW, LEVL III, 30-44 MIN: ICD-10-PCS | Mod: S$GLB,,, | Performed by: PHYSICIAN ASSISTANT

## 2020-08-13 PROCEDURE — 3079F DIAST BP 80-89 MM HG: CPT | Mod: CPTII,S$GLB,, | Performed by: PHYSICIAN ASSISTANT

## 2020-08-13 PROCEDURE — U0003 INFECTIOUS AGENT DETECTION BY NUCLEIC ACID (DNA OR RNA); SEVERE ACUTE RESPIRATORY SYNDROME CORONAVIRUS 2 (SARS-COV-2) (CORONAVIRUS DISEASE [COVID-19]), AMPLIFIED PROBE TECHNIQUE, MAKING USE OF HIGH THROUGHPUT TECHNOLOGIES AS DESCRIBED BY CMS-2020-01-R: HCPCS

## 2020-08-13 NOTE — PATIENT INSTRUCTIONS
Instructions for Patients with Confirmed or Suspected COVID-19    If you are awaiting your test result, you will either be called or it will be released to the patient portal.  If you have any questions about your test, please visit [email or web address] or call our COVID-19 information line at [phone].      Instructions for non-hospitalized or discharged patients with confirmed or suspected COVID-19:       Stay home except to get medical care.    Separate yourself from other people and animals in your home.    Call ahead before visiting your doctor.    Wear a face mask.    Cover your coughs and sneezes.    Clean your hands often.    Avoid sharing personal household items.    Clean all high-touch surfaces every day.    Monitor your symptoms. Seek prompt medical attention if your illness is worsening (e.g., difficulty breathing). Before seeking care, call your healthcare provider.    If you have a medical emergency and must call 911, notify the dispatcher that you have or are being evaluated for COVID-19. If possible, put on a face mask before emergency medical services arrive.    Use the following symptom-based strategy to return to normal activity following a suspected or confirmed case of COVID-19. Continue isolation until:   o At least 3 days (72 hours) have passed since recovery defined as resolution of fever without the use of fever-reducing medications and improvement in respiratory symptoms (e.g. cough, shortness of breath), and   o At least 10 days have passed since the first positive test.       As one of the next steps, you will receive a call or text from the [place] Department of Health ([facility]) COVID-19 Tracing Team. See the contact information below so you know not to ignore the health departments call. It is important that you contact them back immediately so they can help.     Contact Tracer Number:  [phone]  Caller ID for most carriers: [place] [facility]    What is  contact tracing?   Contact tracing is a process that helps identify everyone who has been in close contact with an infected person. Contact tracers let those people know they may have been exposed and guide them on next steps. Confidentiality is important for everyone; no one will be told who may have exposed them to the virus.   Your involvement is important. The more we know about where and how this virus is spreading, the better chance we have at stopping it from spreading further.  What does exposure mean?   Exposure means you have been within 6 feet for more than 15 minutes with a person who has or had COVID-19.  What kind of questions do the contact tracers ask?   A contact tracer will confirm your basic contact information including name, address, phone number, and next of kin, as well as asking about any symptoms you may have had. Theyll also ask you how you think you may have gotten sick, such as places where you may have been exposed to the virus, and people you were with. Those names will never be shared with anyone outside of that call, and will only be used to help trace and stop the spread of the virus.   I have privacy concerns. How will the state use my information?   Your privacy about your health is important. All calls are completed using call centers that use the appropriate health privacy protection measures (HIPAA compliance), meaning that your patient information is safe. No one will ever ask you any questions related to immigration status. Your health comes first.   Do I have to participate?   You do not have to participate, but we strongly encourage you to. Contact tracing can help us catch and control new outbreaks as theyre developing to keep your friends and family safe.   What if I dont hear from anyone?   If you dont receive a call within 24 hours, you can call the number above right away to inquire about your status. That line is open from 8:00 am - 8:00 p.m., 7 days a  week.  Contact tracing saves lives! Together, we have the power to beat this virus and keep our loved ones and neighbors safe.       Instructions for household members, intimate partners and caregivers in a non-healthcare setting of a patient with confirmed or suspected COVID-19:         Close contacts should monitor their health and call their healthcare provider right away if they develop symptoms suggestive of COVID-19 (e.g., fever, cough, shortness of breath).    Stay home except to get medical care. Separate yourself from other people and animals in the home.   Monitor the patients symptoms. If the patient is getting sicker, call his or her healthcare provider. If the patient has a medical emergency and you need to call 911, notify the dispatch personnel that the patient has or is being evaluated for COVID-19.    Wear a facemask when around other people such as sharing a room or vehicle and before entering a healthcare provider's office.   Cover coughs and sneezes with a tissue. Throw used tissues in a lined trash can immediately and wash hands.   Clean hands often with soap and water for at least 20 seconds or with an alcohol-based hand , rubbing hands together until they feel dry. Avoid touching your eyes, nose, and mouth with unwashed hands.   Clean all high-touch; surfaces every day, including counters, tabletops, doorknobs, bathroom fixtures, toilets, phones, keyboards, tablets, bedside tables, etc. Use a household cleaning spray or wipe according to label instructions.   Avoid sharing personal household items such as dishes, drinking glasses, cups, towels, bedding, etc. After these items are used, they should be washed thoroughly with soap and water.   Continue isolation until:   At least 3 days (72 hours) have passed since recovery defined as resolution of fever without the use of fever-reducing medications and improvement in respiratory symptoms (e.g. cough, shortness of breath),  and    At least 10 days have passed since the patients first positive test.    https://www.cdc.gov/coronavirus/2019-ncov/your-health/index.htm

## 2020-08-13 NOTE — PROGRESS NOTES
Subjective:        Time seen by provider: 10:26 AM on 08/13/2020    Rhonda Mojica is a 65 y.o. female with PMHx of DM who presents for an evaluation of possible COVID-19. The patient c/o HA. She denies any other symptoms at this time. Patient reports exposure to granddaughter who recently tested positive for COVID-19. No pertinent PSHx.     Review of Systems   Constitutional: Negative for activity change, appetite change, fatigue and fever.   HENT: Negative for congestion, rhinorrhea and sore throat.    Respiratory: Negative for cough, chest tightness, shortness of breath and wheezing.    Cardiovascular: Negative for chest pain and palpitations.   Gastrointestinal: Negative for diarrhea, nausea and vomiting.   Musculoskeletal: Negative for arthralgias and myalgias.   Skin: Negative for rash.   Neurological: Positive for headaches. Negative for weakness, light-headedness and numbness.       Objective:      Physical Exam  Vitals signs and nursing note reviewed.   Constitutional:       General: She is not in acute distress.     Appearance: She is well-developed. She is not diaphoretic.   HENT:      Head: Normocephalic and atraumatic.      Nose: Nose normal.   Eyes:      Conjunctiva/sclera: Conjunctivae normal.   Neck:      Musculoskeletal: Normal range of motion.   Cardiovascular:      Rate and Rhythm: Normal rate and regular rhythm.      Heart sounds: Normal heart sounds. No murmur.   Pulmonary:      Effort: No respiratory distress.      Breath sounds: Normal breath sounds. No wheezing.   Musculoskeletal: Normal range of motion.   Skin:     General: Skin is warm and dry.   Neurological:      Mental Status: She is alert and oriented to person, place, and time.         Assessment:       1. Suspected Covid-19 Virus Infection        Plan:       1. Suspected Covid-19 Virus Infection  - COVID-19 Routine Screening  2. Discharge home and await results.   3. Return to clinic or ED for new or worsening symptoms.   4. Follow-up  with PCP as needed.     Scribe Attestation:   I, Saundra Noel, am scribing for, and in the presence of, Polina Goff PA-C. I performed the above scribed service and the documentation accurately describes the services I performed. I attest to the accuracy of the note.    I, Polina Goff PA-C, personally performed the services described in this documentation. All medical record entries made by the scribe were at my direction and in my presence.  I have reviewed the chart and agree that the record reflects my personal performance and is accurate and complete. Polina Goff PA-C.  11:59 AM 08/13/2020

## 2020-08-14 LAB — SARS-COV-2 RNA RESP QL NAA+PROBE: NOT DETECTED

## 2020-08-21 ENCOUNTER — TELEPHONE (OUTPATIENT)
Dept: FAMILY MEDICINE | Facility: CLINIC | Age: 65
End: 2020-08-21

## 2020-08-21 NOTE — TELEPHONE ENCOUNTER
----- Message from Emi Balderrama sent at 8/21/2020  9:02 AM CDT -----  LAB, [facility], 8/12/20

## 2020-09-02 DIAGNOSIS — Z12.31 ENCOUNTER FOR SCREENING MAMMOGRAM FOR MALIGNANT NEOPLASM OF BREAST: Primary | ICD-10-CM

## 2020-09-03 NOTE — TELEPHONE ENCOUNTER
----- Message from Kat Davies LPN sent at 9/16/2019 12:14 PM CDT -----  Contact: Rhonda      ----- Message -----  From: Joan Chen  Sent: 9/16/2019   9:27 AM  To: [facility] Staff    The patient has an apt September 17th. Does the patient need labs ? She goes to [facility] . pts # [phone]    Pt came into the ED c/o headache for the past two weeks. Pt states that today she lost her hearing (buzzing noise for approx 1 minute).  Denies fever, SOB, CP.

## 2020-09-16 ENCOUNTER — HOSPITAL ENCOUNTER (OUTPATIENT)
Dept: RADIOLOGY | Facility: HOSPITAL | Age: 65
Discharge: HOME | End: 2020-09-16
Attending: FAMILY MEDICINE
Payer: MEDICARE

## 2020-09-16 VITALS — BODY MASS INDEX: 28.34 KG/M2 | HEIGHT: 64 IN | WEIGHT: 166 LBS

## 2020-09-16 DIAGNOSIS — Z12.31 ENCOUNTER FOR SCREENING MAMMOGRAM FOR MALIGNANT NEOPLASM OF BREAST: ICD-10-CM

## 2020-09-16 PROCEDURE — 77067 SCR MAMMO BI INCL CAD: CPT | Mod: TC,PO

## 2020-09-17 ENCOUNTER — TELEPHONE (OUTPATIENT)
Dept: FAMILY MEDICINE | Facility: CLINIC | Age: 65
End: 2020-09-17

## 2020-09-17 NOTE — TELEPHONE ENCOUNTER
----- Message from Kelly Goldman sent at 9/17/2020  9:13 AM CDT -----  Kemar pharmacist with [employer] calling to  on a request to discuss starting a statin as a preventative.   # [phone]

## 2020-09-17 NOTE — TELEPHONE ENCOUNTER
Spoke to someone from [facility]. He stated they didn't have any notes in the system regarding the message below.

## 2020-09-18 ENCOUNTER — HOSPITAL ENCOUNTER (EMERGENCY)
Facility: HOSPITAL | Age: 65
Discharge: HOME | End: 2020-09-18
Attending: EMERGENCY MEDICINE
Payer: MEDICARE

## 2020-09-18 VITALS
HEIGHT: 64 IN | BODY MASS INDEX: 26.29 KG/M2 | WEIGHT: 154 LBS | DIASTOLIC BLOOD PRESSURE: 84 MMHG | OXYGEN SATURATION: 99 % | SYSTOLIC BLOOD PRESSURE: 161 MMHG | HEART RATE: 74 BPM | TEMPERATURE: 98 F | RESPIRATION RATE: 18 BRPM

## 2020-09-18 DIAGNOSIS — R00.2 PALPITATIONS: Primary | ICD-10-CM

## 2020-09-18 LAB
ANION GAP SERPL CALC-SCNC: 14 MMOL/L (ref 8–16)
BACTERIA #/AREA URNS HPF: ABNORMAL /HPF
BASOPHILS # BLD AUTO: 0.06 K/UL (ref 0–0.2)
BASOPHILS NFR BLD: 0.6 % (ref 0–1.9)
BILIRUB UR QL STRIP: NEGATIVE
BNP SERPL-MCNC: 15 PG/ML (ref 0–99)
BUN SERPL-MCNC: 14 MG/DL (ref 8–23)
CALCIUM SERPL-MCNC: 9.3 MG/DL (ref 8.7–10.5)
CHLORIDE SERPL-SCNC: 105 MMOL/L (ref 95–110)
CLARITY UR: CLEAR
CO2 SERPL-SCNC: 23 MMOL/L (ref 23–29)
COLOR UR: YELLOW
CREAT SERPL-MCNC: 1 MG/DL (ref 0.5–1.4)
DIFFERENTIAL METHOD: ABNORMAL
EOSINOPHIL # BLD AUTO: 0.4 K/UL (ref 0–0.5)
EOSINOPHIL NFR BLD: 3.7 % (ref 0–8)
ERYTHROCYTE [DISTWIDTH] IN BLOOD BY AUTOMATED COUNT: 14.9 % (ref 11.5–14.5)
EST. GFR  (AFRICAN AMERICAN): >60 ML/MIN/1.73 M^2
EST. GFR  (NON AFRICAN AMERICAN): 59 ML/MIN/1.73 M^2
GLUCOSE SERPL-MCNC: 139 MG/DL (ref 70–110)
GLUCOSE UR QL STRIP: NEGATIVE
HCT VFR BLD AUTO: 36 % (ref 37–48.5)
HGB BLD-MCNC: 11.8 G/DL (ref 12–16)
HGB UR QL STRIP: NEGATIVE
IMM GRANULOCYTES # BLD AUTO: 0.02 K/UL (ref 0–0.04)
IMM GRANULOCYTES NFR BLD AUTO: 0.2 % (ref 0–0.5)
KETONES UR QL STRIP: NEGATIVE
LEUKOCYTE ESTERASE UR QL STRIP: ABNORMAL
LYMPHOCYTES # BLD AUTO: 2.1 K/UL (ref 1–4.8)
LYMPHOCYTES NFR BLD: 22.9 % (ref 18–48)
MCH RBC QN AUTO: 22.1 PG (ref 27–31)
MCHC RBC AUTO-ENTMCNC: 32.8 G/DL (ref 32–36)
MCV RBC AUTO: 68 FL (ref 82–98)
MICROSCOPIC COMMENT: ABNORMAL
MONOCYTES # BLD AUTO: 0.8 K/UL (ref 0.3–1)
MONOCYTES NFR BLD: 8.9 % (ref 4–15)
NEUTROPHILS # BLD AUTO: 6 K/UL (ref 1.8–7.7)
NEUTROPHILS NFR BLD: 63.7 % (ref 38–73)
NITRITE UR QL STRIP: NEGATIVE
NRBC BLD-RTO: 0 /100 WBC
PH UR STRIP: 6 [PH] (ref 5–8)
PLATELET # BLD AUTO: 317 K/UL (ref 150–350)
PMV BLD AUTO: 11.3 FL (ref 9.2–12.9)
POCT GLUCOSE: 143 MG/DL (ref 70–110)
POTASSIUM SERPL-SCNC: 4.2 MMOL/L (ref 3.5–5.1)
PROT UR QL STRIP: NEGATIVE
RBC # BLD AUTO: 5.33 M/UL (ref 4–5.4)
SODIUM SERPL-SCNC: 142 MMOL/L (ref 136–145)
SP GR UR STRIP: 1.02 (ref 1–1.03)
SQUAMOUS #/AREA URNS HPF: 2 /HPF
TROPONIN I SERPL DL<=0.01 NG/ML-MCNC: <0.006 NG/ML (ref 0–0.03)
URN SPEC COLLECT METH UR: ABNORMAL
UROBILINOGEN UR STRIP-ACNC: 1 EU/DL
WBC # BLD AUTO: 9.36 K/UL (ref 3.9–12.7)
WBC #/AREA URNS HPF: 7 /HPF (ref 0–5)

## 2020-09-18 PROCEDURE — 93005 ELECTROCARDIOGRAM TRACING: CPT

## 2020-09-18 PROCEDURE — 80048 BASIC METABOLIC PNL TOTAL CA: CPT

## 2020-09-18 PROCEDURE — 84484 ASSAY OF TROPONIN QUANT: CPT

## 2020-09-18 PROCEDURE — 93010 EKG 12-LEAD: ICD-10-PCS | Mod: ,,, | Performed by: INTERNAL MEDICINE

## 2020-09-18 PROCEDURE — 36415 COLL VENOUS BLD VENIPUNCTURE: CPT

## 2020-09-18 PROCEDURE — 99285 EMERGENCY DEPT VISIT HI MDM: CPT | Mod: 25

## 2020-09-18 PROCEDURE — 93010 ELECTROCARDIOGRAM REPORT: CPT | Mod: ,,, | Performed by: INTERNAL MEDICINE

## 2020-09-18 PROCEDURE — 82962 GLUCOSE BLOOD TEST: CPT

## 2020-09-18 PROCEDURE — 81000 URINALYSIS NONAUTO W/SCOPE: CPT

## 2020-09-18 PROCEDURE — 83880 ASSAY OF NATRIURETIC PEPTIDE: CPT

## 2020-09-18 PROCEDURE — 85025 COMPLETE CBC W/AUTO DIFF WBC: CPT

## 2020-09-18 NOTE — ED PROVIDER NOTES
Encounter Date: 9/18/2020    SCRIBE #1 NOTE: I, Micky Alvarado am scribing for, and in the presence of, Thai Alcantara MD.       History     Chief Complaint   Patient presents with    Palpitations       Time seen by provider: 8:53 AM on 09/18/2020    Rhonda Mojica is a 65 y.o. female with PMHx of DM II, GERD, HTN, and DDD who presents to the ED with an onset of palpitations beginning yesterday evening keeping her up in her sleep last night. The patient noted she had COVID-19 x2 months ago, but endorses she recovered well. The patient notes she had a stress test last year, but denies any abnormal findings. The patient denies not being on any fluids pills currently. The patient denies chest pain, SOB, hematuria, constipation, dysuria, or any other symptoms at this time. No PSHx.      The history is provided by the patient.     Review of patient's allergies indicates:  No Known Allergies  Past Medical History:   Diagnosis Date    DDD (degenerative disc disease), lumbar     DM type 2 with diabetic dyslipidemia     Dyslipidemia     GERD (gastroesophageal reflux disease)     Hypertension      History reviewed. No pertinent surgical history.  History reviewed. No pertinent family history.  Social History     Tobacco Use    Smoking status: Never Smoker    Smokeless tobacco: Never Used   Substance Use Topics    Alcohol use: Never     Frequency: Never    Drug use: Never     Review of Systems   Constitutional: Negative for activity change, diaphoresis and fever.   HENT: Negative for ear pain, rhinorrhea, sore throat and trouble swallowing.    Eyes: Negative for pain and visual disturbance.   Respiratory: Negative for cough, shortness of breath and stridor.    Cardiovascular: Positive for palpitations. Negative for chest pain.   Gastrointestinal: Negative for abdominal pain, blood in stool, constipation, diarrhea, nausea and vomiting.   Genitourinary: Negative for dysuria, hematuria, vaginal bleeding and vaginal  discharge.   Musculoskeletal: Negative for gait problem.   Skin: Negative for rash and wound.   Neurological: Negative for seizures and headaches.   Psychiatric/Behavioral: Negative for hallucinations and suicidal ideas.       Physical Exam     Initial Vitals [09/18/20 0837]   BP Pulse Resp Temp SpO2   (!) 211/95 88 18 98.1 °F (36.7 °C) 98 %      MAP       --         Physical Exam    Nursing note and vitals reviewed.  Constitutional: She appears well-developed. She is not diaphoretic. No distress.   HENT:   Head: Normocephalic and atraumatic.   Nose: Nose normal.   Eyes: EOM are normal. No scleral icterus.   Neck: Normal range of motion. Neck supple.   Cardiovascular: Normal rate, regular rhythm, normal heart sounds and intact distal pulses. Exam reveals no gallop and no friction rub.    No murmur heard.  Pulmonary/Chest: Breath sounds normal. No stridor. No respiratory distress. She has no wheezes. She has no rhonchi. She has no rales.   Abdominal: Soft. Bowel sounds are normal. There is no abdominal tenderness.   Musculoskeletal: Normal range of motion.   Neurological: She is alert and oriented to person, place, and time. No cranial nerve deficit.   Skin: Skin is warm and dry. Capillary refill takes less than 2 seconds. No rash noted.   Psychiatric: She has a normal mood and affect.         ED Course   Procedures  Labs Reviewed   CBC W/ AUTO DIFFERENTIAL - Abnormal; Notable for the following components:       Result Value    Hemoglobin 11.8 (*)     Hematocrit 36.0 (*)     Mean Corpuscular Volume 68 (*)     Mean Corpuscular Hemoglobin 22.1 (*)     RDW 14.9 (*)     All other components within normal limits   BASIC METABOLIC PANEL - Abnormal; Notable for the following components:    Glucose 139 (*)     eGFR if non  59 (*)     All other components within normal limits   URINALYSIS, REFLEX TO URINE CULTURE - Abnormal; Notable for the following components:    Leukocytes, UA 1+ (*)     All other  components within normal limits    Narrative:     Specimen Source->Urine   URINALYSIS MICROSCOPIC - Abnormal; Notable for the following components:    WBC, UA 7 (*)     Bacteria Few (*)     All other components within normal limits    Narrative:     Specimen Source->Urine   POCT GLUCOSE - Abnormal; Notable for the following components:    POCT Glucose 143 (*)     All other components within normal limits   TROPONIN I   B-TYPE NATRIURETIC PEPTIDE     EKG Readings: (Independently Interpreted)   Initial Reading: No STEMI. Heart Rate: 75.   Normal sinus rhythm with sinus arrhythmia at 75 bpm. Left anterior fascicular block.  Left ventricular hyperthrophy with QRS widening and repolarization abnormality. Cannot rule out Septal infarct, age undetermined. No STEMI.     ECG Results          EKG 12-lead (In process)  Result time 09/18/20 09:41:19    In process by Interface, Lab In [facility] (09/18/20 09:41:19)                 Narrative:    Test Reason : R00.2,    Vent. Rate : 075 BPM     Atrial Rate : 075 BPM     P-R Int : 196 ms          QRS Dur : 118 ms      QT Int : 374 ms       P-R-T Axes : 070 -59 079 degrees     QTc Int : 417 ms    Normal sinus rhythm with sinus arrhythmia  Left anterior fascicular block  LVH with QRS widening and repolarization abnormality  Cannot rule out Septal infarct (cited on or before 16-JAN-2020)  Abnormal ECG  When compared with ECG of 16-JAN-2020 15:36,  Questionable change in initial forces of Anterior leads    Referred By:             Confirmed By:                             Imaging Results          X-Ray Chest AP Portable (Final result)  Result time 09/18/20 09:28:54    Final result by Laura De La Rosa MD (09/18/20 09:28:54)                 Impression:      No acute abnormality.      Electronically signed by: Laura De La Rosa MD  Date:    09/18/2020  Time:    09:28             Narrative:    EXAMINATION:  XR CHEST AP PORTABLE    CLINICAL HISTORY:  Palpitations    TECHNIQUE:  Single frontal  view of the chest was performed.    COMPARISON:  January 16, 2020    FINDINGS:  The lungs are clear, with normal appearance of pulmonary vasculature and no pleural effusion or pneumothorax.    The cardiac silhouette is normal in size. The hilar and mediastinal contours are unremarkable.    Bones are intact.                                 Medical Decision Making:   History:   Old Medical Records: I decided to obtain old medical records.  Independently Interpreted Test(s):   I have ordered and independently interpreted X-rays - see prior notes.  I have ordered and independently interpreted EKG Reading(s) - see prior notes  Clinical Tests:   Lab Tests: Reviewed and Ordered  Radiological Study: Ordered and Reviewed  Medical Tests: Ordered and Reviewed  ED Management:  This patient presents with symptoms consistent with palpitations. Low suspicion for acute cardiopulmonary process including ACS, PE, or thoracic aortic dissection. Denies any ingestions or any other medical complaints. No evidence of alcohol withdrawal symptoms. Presentation not consistent with overt toxidrome, ingestion given history & physical. Presentation not consistent with organic or medical emergency at this time. No acute indication for psychiatric consultation (without SI/HI, AH/VH). Cautious return precautions discussed with full understanding.              Scribe Attestation:   Scribe #1: I performed the above scribed service and the documentation accurately describes the services I performed. I attest to the accuracy of the note.      Attending Attestation:     Physician Attestation for Scribe:    I, Dr. Thai Alcantara, personally performed the services described in this documentation.   All medical record entries made by the scribe were at my direction and in my presence.   I have reviewed the chart and agree that the record is accurate and complete.   Thai Alcantara MD  11:27 PM 09/18/2020     DISCLAIMER: This note was prepared with [employer] voice recognition transcription software. Garbled syntax, mangled pronouns, and other bizarre constructions may be attributed to that software system.          ED Course as of Sep 18 2327   Fri Sep 18, 2020   0843  64 y.o. female with a PMHx of HLD, DM, HTN and GERD     [BD]      ED Course User Index  [BD] Thai Alcantara MD            Clinical Impression:       ICD-10-CM ICD-9-CM   1. Palpitations  R00.2 785.1                          ED Disposition Condition    Discharge Stable        ED Prescriptions     None        Follow-up Information     Follow up With Specialties Details Why Contact Info    Pool Varner MD Cardiology Go in 2 days  [address] [facility] [address]  [phone]      Priyanka Garibay NP Family Medicine Go in 2 days  [address] [facility] [facility]  [address]  [facility] [address]  [phone]      [facility] Emergency Medicine Go to  As needed, If symptoms worsen [address]  [facility] [id number] [phone]                                       Thai Alcantara MD  09/18/20 2321

## 2020-09-21 ENCOUNTER — TELEPHONE (OUTPATIENT)
Dept: FAMILY MEDICINE | Facility: CLINIC | Age: 65
End: 2020-09-21

## 2020-09-21 NOTE — TELEPHONE ENCOUNTER
----- Message from Alex Trujillo MD sent at 9/19/2020  4:40 PM CDT -----  Call patient.  Mammogram was normal.  Repeat mammogram in 1 year.

## 2020-10-06 DIAGNOSIS — E78.5 DM TYPE 2 WITH DIABETIC DYSLIPIDEMIA: ICD-10-CM

## 2020-10-06 DIAGNOSIS — E11.69 DM TYPE 2 WITH DIABETIC DYSLIPIDEMIA: ICD-10-CM

## 2020-10-06 RX ORDER — SEMAGLUTIDE 1.34 MG/ML
0.75 INJECTION, SOLUTION SUBCUTANEOUS WEEKLY
Qty: 6 SYRINGE | Refills: 1 | Status: SHIPPED | OUTPATIENT
Start: 2020-10-06 | End: 2021-01-04

## 2020-10-06 NOTE — TELEPHONE ENCOUNTER
----- Message from Mary Ann Cooper sent at 10/6/2020  8:54 AM CDT -----  Regarding: Refill  Contact: Rhonda Mojica  Needs refill on Ozempic  Send to [facility]   Pt# [phone]

## 2020-11-09 ENCOUNTER — TELEPHONE (OUTPATIENT)
Dept: FAMILY MEDICINE | Facility: CLINIC | Age: 65
End: 2020-11-09

## 2020-11-14 LAB — HBA1C MFR BLD: 7.8 % OF TOTAL HGB

## 2020-11-20 ENCOUNTER — OFFICE VISIT (OUTPATIENT)
Dept: FAMILY MEDICINE | Facility: CLINIC | Age: 65
End: 2020-11-20
Payer: MEDICARE

## 2020-11-20 VITALS
HEIGHT: 64 IN | DIASTOLIC BLOOD PRESSURE: 88 MMHG | SYSTOLIC BLOOD PRESSURE: 178 MMHG | HEART RATE: 60 BPM | WEIGHT: 168 LBS | BODY MASS INDEX: 28.68 KG/M2

## 2020-11-20 DIAGNOSIS — I10 ESSENTIAL HYPERTENSION: Primary | ICD-10-CM

## 2020-11-20 DIAGNOSIS — Z23 FLU VACCINE NEED: ICD-10-CM

## 2020-11-20 DIAGNOSIS — E78.5 DM TYPE 2 WITH DIABETIC DYSLIPIDEMIA: ICD-10-CM

## 2020-11-20 DIAGNOSIS — Z78.0 MENOPAUSE: ICD-10-CM

## 2020-11-20 DIAGNOSIS — E11.65 TYPE 2 DIABETES MELLITUS WITH HYPERGLYCEMIA, WITHOUT LONG-TERM CURRENT USE OF INSULIN: ICD-10-CM

## 2020-11-20 DIAGNOSIS — E78.5 DYSLIPIDEMIA: ICD-10-CM

## 2020-11-20 DIAGNOSIS — E11.69 DM TYPE 2 WITH DIABETIC DYSLIPIDEMIA: ICD-10-CM

## 2020-11-20 DIAGNOSIS — Z13.820 OSTEOPOROSIS SCREENING: ICD-10-CM

## 2020-11-20 LAB — HBA1C MFR BLD: 7.8 %

## 2020-11-20 PROCEDURE — 99214 OFFICE O/P EST MOD 30 MIN: CPT | Mod: 25,S$GLB,, | Performed by: NURSE PRACTITIONER

## 2020-11-20 PROCEDURE — G0008 ADMIN INFLUENZA VIRUS VAC: HCPCS | Mod: S$GLB,,, | Performed by: NURSE PRACTITIONER

## 2020-11-20 PROCEDURE — G0008 FLU VACCINE - QUADRIVALENT - HIGH DOSE (65+) PRESERVATIVE FREE IM: ICD-10-PCS | Mod: S$GLB,,, | Performed by: NURSE PRACTITIONER

## 2020-11-20 PROCEDURE — 1159F MED LIST DOCD IN RCRD: CPT | Mod: S$GLB,,, | Performed by: NURSE PRACTITIONER

## 2020-11-20 PROCEDURE — 83036 HEMOGLOBIN GLYCOSYLATED A1C: CPT | Mod: QW,,, | Performed by: NURSE PRACTITIONER

## 2020-11-20 PROCEDURE — 3077F PR MOST RECENT SYSTOLIC BLOOD PRESSURE >= 140 MM HG: ICD-10-PCS | Mod: S$GLB,,, | Performed by: NURSE PRACTITIONER

## 2020-11-20 PROCEDURE — 3051F HG A1C>EQUAL 7.0%<8.0%: CPT | Mod: S$GLB,,, | Performed by: NURSE PRACTITIONER

## 2020-11-20 PROCEDURE — 99214 PR OFFICE/OUTPT VISIT, EST, LEVL IV, 30-39 MIN: ICD-10-PCS | Mod: 25,S$GLB,, | Performed by: NURSE PRACTITIONER

## 2020-11-20 PROCEDURE — 3079F PR MOST RECENT DIASTOLIC BLOOD PRESSURE 80-89 MM HG: ICD-10-PCS | Mod: S$GLB,,, | Performed by: NURSE PRACTITIONER

## 2020-11-20 PROCEDURE — 1159F PR MEDICATION LIST DOCUMENTED IN MEDICAL RECORD: ICD-10-PCS | Mod: S$GLB,,, | Performed by: NURSE PRACTITIONER

## 2020-11-20 PROCEDURE — 3077F SYST BP >= 140 MM HG: CPT | Mod: S$GLB,,, | Performed by: NURSE PRACTITIONER

## 2020-11-20 PROCEDURE — 83036 POCT HEMOGLOBIN A1C: ICD-10-PCS | Mod: QW,,, | Performed by: NURSE PRACTITIONER

## 2020-11-20 PROCEDURE — 3051F PR MOST RECENT HEMOGLOBIN A1C LEVEL 7.0 - < 8.0%: ICD-10-PCS | Mod: S$GLB,,, | Performed by: NURSE PRACTITIONER

## 2020-11-20 PROCEDURE — 3008F PR BODY MASS INDEX (BMI) DOCUMENTED: ICD-10-PCS | Mod: S$GLB,,, | Performed by: NURSE PRACTITIONER

## 2020-11-20 PROCEDURE — 90662 FLU VACCINE - QUADRIVALENT - HIGH DOSE (65+) PRESERVATIVE FREE IM: ICD-10-PCS | Mod: S$GLB,,, | Performed by: NURSE PRACTITIONER

## 2020-11-20 PROCEDURE — 3008F BODY MASS INDEX DOCD: CPT | Mod: S$GLB,,, | Performed by: NURSE PRACTITIONER

## 2020-11-20 PROCEDURE — 3079F DIAST BP 80-89 MM HG: CPT | Mod: S$GLB,,, | Performed by: NURSE PRACTITIONER

## 2020-11-20 PROCEDURE — 90662 IIV NO PRSV INCREASED AG IM: CPT | Mod: S$GLB,,, | Performed by: NURSE PRACTITIONER

## 2020-11-20 RX ORDER — GLIPIZIDE 5 MG/1
TABLET ORAL
Qty: 135 TABLET | Refills: 1 | Status: SHIPPED | OUTPATIENT
Start: 2020-11-20 | End: 2021-02-25 | Stop reason: SDUPTHER

## 2020-11-20 RX ORDER — LANCETS
EACH MISCELLANEOUS
Qty: 180 EACH | Refills: 3 | Status: SHIPPED | OUTPATIENT
Start: 2020-11-20 | End: 2021-04-22 | Stop reason: SDUPTHER

## 2020-11-20 RX ORDER — INSULIN PUMP SYRINGE, 3 ML
EACH MISCELLANEOUS
Qty: 1 EACH | Refills: 0 | Status: SHIPPED | OUTPATIENT
Start: 2020-11-20 | End: 2021-04-22 | Stop reason: SDUPTHER

## 2020-11-20 NOTE — PROGRESS NOTES
SUBJECTIVE:    Patient ID: Rhonda Mojica is a 65 y.o. female.    Chief Complaint: Diabetes (no bottles, Pt uncertain on refills, declines pna shot, wants flu shot, prepared for foot exam, wants dexa, scheduled eye exam DJ)    Pt here for regular f/u.   Pt reports since last visit had f/u with Dr. Varner for elevated BP- she had echo/stress test and told everything looked okay. Lisinopril dose increased and reports home BP readings had improved however BP machine now not working. Didn't take her lisinopril this AM before she left the house though normally takes it first thing in AM  Admits to eating more and gaining weight- blames it on stress of COVID and grandkids. Reports FBS ranging 150-170s      Admission on 09/18/2020, Discharged on 09/18/2020   Component Date Value Ref Range Status    WBC 09/18/2020 9.36  3.90 - 12.70 K/uL Final    RBC 09/18/2020 5.33  4.00 - 5.40 M/uL Final    Hemoglobin 09/18/2020 11.8* 12.0 - 16.0 g/dL Final    Hematocrit 09/18/2020 36.0* 37.0 - 48.5 % Final    MCV 09/18/2020 68* 82 - 98 fL Final    MCH 09/18/2020 22.1* 27.0 - 31.0 pg Final    MCHC 09/18/2020 32.8  32.0 - 36.0 g/dL Final    RDW 09/18/2020 14.9* 11.5 - 14.5 % Final    Platelets 09/18/2020 317  150 - 350 K/uL Final    MPV 09/18/2020 11.3  9.2 - 12.9 fL Final    Immature Granulocytes 09/18/2020 0.2  0.0 - 0.5 % Final    Gran # (ANC) 09/18/2020 6.0  1.8 - 7.7 K/uL Final    Immature Grans (Abs) 09/18/2020 0.02  0.00 - 0.04 K/uL Final    Lymph # 09/18/2020 2.1  1.0 - 4.8 K/uL Final    Mono # 09/18/2020 0.8  0.3 - 1.0 K/uL Final    Eos # 09/18/2020 0.4  0.0 - 0.5 K/uL Final    Baso # 09/18/2020 0.06  0.00 - 0.20 K/uL Final    nRBC 09/18/2020 0  0 /100 WBC Final    Gran % 09/18/2020 63.7  38.0 - 73.0 % Final    Lymph % 09/18/2020 22.9  18.0 - 48.0 % Final    Mono % 09/18/2020 8.9  4.0 - 15.0 % Final    Eosinophil % 09/18/2020 3.7  0.0 - 8.0 % Final    Basophil % 09/18/2020 0.6  0.0 - 1.9 % Final     Differential Method 09/18/2020 Automated   Final    Sodium 09/18/2020 142  136 - 145 mmol/L Final    Potassium 09/18/2020 4.2  3.5 - 5.1 mmol/L Final    Chloride 09/18/2020 105  95 - 110 mmol/L Final    CO2 09/18/2020 23  23 - 29 mmol/L Final    Glucose 09/18/2020 139* 70 - 110 mg/dL Final    BUN 09/18/2020 14  8 - 23 mg/dL Final    Creatinine 09/18/2020 1.0  0.5 - 1.4 mg/dL Final    Calcium 09/18/2020 9.3  8.7 - 10.5 mg/dL Final    Anion Gap 09/18/2020 14  8 - 16 mmol/L Final    eGFR if African American 09/18/2020 >60  >60 mL/min/1.73 m^2 Final    eGFR if non African American 09/18/2020 59* >60 mL/min/1.73 m^2 Final    Specimen UA 09/18/2020 Urine, Clean Catch   Final    Color, UA 09/18/2020 Yellow  Yellow, Straw, [place] Final    Appearance, UA 09/18/2020 Clear  Clear Final    pH, UA 09/18/2020 6.0  5.0 - 8.0 Final    Specific Gravity, UA 09/18/2020 1.025  1.005 - 1.030 Final    Protein, UA 09/18/2020 Negative  Negative Final    Glucose, UA 09/18/2020 Negative  Negative Final    Ketones, UA 09/18/2020 Negative  Negative Final    Bilirubin (UA) 09/18/2020 Negative  Negative Final    Occult Blood UA 09/18/2020 Negative  Negative Final    Nitrite, UA 09/18/2020 Negative  Negative Final    Urobilinogen, UA 09/18/2020 1.0  <2.0 EU/dL Final    Leukocytes, UA 09/18/2020 1+* Negative Final    Troponin I 09/18/2020 <0.006  0.000 - 0.026 ng/mL Final    BNP 09/18/2020 15  0 - 99 pg/mL Final    POCT Glucose 09/18/2020 143* 70 - 110 mg/dL Final    WBC, UA 09/18/2020 7* 0 - 5 /hpf Final    Bacteria 09/18/2020 Few* None-Occ /hpf Final    Squam Epithel, UA 09/18/2020 2  /hpf Final    Microscopic Comment 09/18/2020 SEE COMMENT   Final   Office Visit on 08/13/2020   Component Date Value Ref Range Status    SARS-CoV2 (COVID-19) Qualitative P* 08/13/2020 Not Detected  Not Detected Final   Office Visit on 07/22/2020   Component Date Value Ref Range Status    Hemoglobin A1C 11/13/2020 7.8* <5.7 % of total  Hgb Final       Past Medical History:   Diagnosis Date    DDD (degenerative disc disease), lumbar     DM type 2 with diabetic dyslipidemia     Dyslipidemia     GERD (gastroesophageal reflux disease)     Hypertension      History reviewed. No pertinent surgical history.  History reviewed. No pertinent family history.    Marital Status:   Alcohol History:  reports no history of alcohol use.  Tobacco History:  reports that she has never smoked. She has never used smokeless tobacco.  Drug History:  reports no history of drug use.    Health Maintenance Topics with due status: Not Due       Topic Last Completion Date    Lipid Panel 07/20/2020    Colorectal Cancer Screening 08/12/2020    Mammogram 09/16/2020    Hemoglobin A1c 11/13/2020    Low Dose Statin 11/20/2020     Immunization History   Administered Date(s) Administered    Influenza - Quadrivalent - High Dose - PF (65 years and older) 11/20/2020    Pneumococcal Polysaccharide - 23 Valent 04/28/2016       Review of patient's allergies indicates:  No Known Allergies    Current Outpatient Medications:     amLODIPine (NORVASC) 5 MG tablet, Take 1 tablet (5 mg total) by mouth once daily., Disp: 90 tablet, Rfl: 1    aspirin 325 MG tablet, Take 325 mg by mouth once daily. , Disp: , Rfl:     glipiZIDE (GLUCOTROL) 5 MG tablet, 1 tablet in AM with breakfast and 1/2 tablet in evening with supper, Disp: 135 tablet, Rfl: 1    lisinopril-hydrochlorothiazide (PRINZIDE,ZESTORETIC) 20-12.5 mg per tablet, Take 1 tablet by mouth once daily. (Patient taking differently: Take 2 tablets by mouth once daily. ), Disp: 90 tablet, Rfl: 1    meloxicam (MOBIC) 15 MG tablet, Take 1 tablet (15 mg total) by mouth once daily., Disp: 90 tablet, Rfl: 1    metFORMIN (GLUCOPHAGE) 1000 MG tablet, Take 1 tablet (1,000 mg total) by mouth 2 (two) times daily with meals., Disp: 180 tablet, Rfl: 1    omeprazole (PRILOSEC) 20 MG capsule, Take 1 capsule (20 mg total) by mouth once daily.,  Disp: 90 capsule, Rfl: 1    pravastatin (PRAVACHOL) 40 MG tablet, Take 1 tablet (40 mg total) by mouth once daily. For cholesterol., Disp: 90 tablet, Rfl: 1    semaglutide (OZEMPIC) 1 mg/dose (2 mg/1.5 mL) PnIj, Inject 0.75 mLs into the skin once a week., Disp: 6 Syringe, Rfl: 1    tiZANidine (ZANAFLEX) 4 MG tablet, Take 1 tablet (4 mg total) by mouth nightly as needed., Disp: 90 tablet, Rfl: 1    blood sugar diagnostic Strp, To check BG 2 times daily, to use with insurance preferred meter, Disp: 180 strip, Rfl: 3    blood-glucose meter kit, To check BG 2 times daily, to use with insurance preferred meter, Disp: 1 each, Rfl: 0    calcium-vitamin D tablet 600 mg-200 units, Take 1 tablet by mouth once daily. , Disp: , Rfl:     cholecalciferol, vitamin D3, (VITAMIN D3) 2,000 unit Cap, Take 1 capsule by mouth once daily. , Disp: , Rfl:     lancets Misc, To check BG 2 times daily, to use with insurance preferred meter, Disp: 180 each, Rfl: 3    Review of Systems   Constitutional: Positive for unexpected weight change. Negative for chills and fever.   HENT: Negative for hearing loss and trouble swallowing.    Eyes: Negative for visual disturbance.   Respiratory: Negative for cough, chest tightness, shortness of breath and wheezing.    Cardiovascular: Negative for chest pain and palpitations.   Gastrointestinal: Negative for abdominal pain, blood in stool, constipation, diarrhea and vomiting.   Endocrine: Negative for polydipsia and polyuria.   Genitourinary: Negative for difficulty urinating, dysuria and hematuria.   Musculoskeletal: Positive for arthralgias (right lower back- worse with certain movements). Negative for joint swelling and neck pain.   Neurological: Negative for dizziness, weakness, numbness and headaches.   Psychiatric/Behavioral: Negative for confusion and dysphoric mood.          Objective:      Vitals:    11/20/20 0910 11/20/20 0912   BP: (!) 178/86 (!) 178/88   Pulse: 60    Weight: 76.2 kg  "(168 lb)    Height: 5' 4" (1.626 m)      Physical Exam  Vitals signs and nursing note reviewed.   Constitutional:       General: She is not in acute distress.     Appearance: Normal appearance. She is well-developed.   HENT:      Head: Normocephalic and atraumatic.      Right Ear: Tympanic membrane and ear canal normal.      Left Ear: Tympanic membrane and ear canal normal.   Neck:      Musculoskeletal: Neck supple.      Vascular: No carotid bruit.   Cardiovascular:      Rate and Rhythm: Normal rate and regular rhythm.      Pulses:           Dorsalis pedis pulses are 2+ on the right side and 2+ on the left side.      Heart sounds: Murmur (2nd ICS LSB) present. Systolic murmur present with a grade of 2/6. No friction rub. No gallop.    Pulmonary:      Effort: Pulmonary effort is normal. No respiratory distress.      Breath sounds: Normal breath sounds. No wheezing or rales.   Abdominal:      General: There is no distension.      Palpations: Abdomen is soft.      Tenderness: There is no abdominal tenderness.   Musculoskeletal:      Right lower leg: No edema.      Left lower leg: No edema.   Feet:      Right foot:      Protective Sensation: 6 sites tested. 6 sites sensed.      Skin integrity: No ulcer, erythema or callus.      Toenail Condition: Right toenails are normal.      Left foot:      Protective Sensation: 6 sites tested. 6 sites sensed.      Skin integrity: No ulcer, erythema or callus.      Toenail Condition: Left toenails are normal.   Lymphadenopathy:      Cervical: No cervical adenopathy.   Skin:     General: Skin is warm and dry.      Findings: No rash.   Neurological:      General: No focal deficit present.      Mental Status: She is alert and oriented to person, place, and time.      Gait: Gait normal.   Psychiatric:         Mood and Affect: Mood normal.           Assessment:       1. Essential hypertension    2. DM type 2 with diabetic dyslipidemia    3. Dyslipidemia    4. Type 2 diabetes mellitus with " hyperglycemia, without long-term current use of insulin    5. Flu vaccine need    6. Menopause    7. Osteoporosis screening           Plan:       Essential hypertension  Comments:  BP elevated today however she reports she didn't take her BP meds this AM. REcommend she monitor BP at home and return in 2 weeks for BP check    DM type 2 with diabetic dyslipidemia  Comments:  A1C up to 7.8%-discussed importance of DM diet-rec for now to add 1/2 tab of glipizide with evening meal however if FBS remain >150 may need to incr to 1tab BID  Orders:  -     glipiZIDE (GLUCOTROL) 5 MG tablet; 1 tablet in AM with breakfast and 1/2 tablet in evening with supper  Dispense: 135 tablet; Refill: 1    Dyslipidemia  Comments:  controlled on last labs    Type 2 diabetes mellitus with hyperglycemia, without long-term current use of insulin  -     blood-glucose meter kit; To check BG 2 times daily, to use with insurance preferred meter  Dispense: 1 each; Refill: 0  -     lancets Misc; To check BG 2 times daily, to use with insurance preferred meter  Dispense: 180 each; Refill: 3  -     blood sugar diagnostic Strp; To check BG 2 times daily, to use with insurance preferred meter  Dispense: 180 strip; Refill: 3  -     Hemoglobin A1C, POCT    Flu vaccine need  -     Influenza - Quadrivalent - High Dose (65+) (PF) (IM)    Menopause  -     DXA Bone Density Spine And Hip; Future; Expected date: 11/20/2020    Osteoporosis screening  -     DXA Bone Density Spine And Hip; Future; Expected date: 11/20/2020      Follow up in about 3 months (around 2/20/2021) for nurse visit in 2 weeks for BP check, Diabetes, HTN.        11/20/2020 Priyanka Garibay NP

## 2020-12-02 ENCOUNTER — HOSPITAL ENCOUNTER (EMERGENCY)
Facility: HOSPITAL | Age: 65
Discharge: HOME | End: 2020-12-02
Attending: EMERGENCY MEDICINE
Payer: MEDICARE

## 2020-12-02 VITALS
BODY MASS INDEX: 28.56 KG/M2 | SYSTOLIC BLOOD PRESSURE: 193 MMHG | HEIGHT: 64 IN | RESPIRATION RATE: 16 BRPM | TEMPERATURE: 99 F | DIASTOLIC BLOOD PRESSURE: 86 MMHG | OXYGEN SATURATION: 98 % | HEART RATE: 97 BPM | WEIGHT: 167.31 LBS
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
## 2020-12-02 DIAGNOSIS — S70.12XA HEMATOMA OF LEFT THIGH, INITIAL ENCOUNTER: Primary | ICD-10-CM

## 2020-12-02 DIAGNOSIS — S09.90XA CLOSED HEAD INJURY, INITIAL ENCOUNTER: ICD-10-CM

## 2020-12-02 PROCEDURE — 25000003 PHARM REV CODE 250: Performed by: EMERGENCY MEDICINE

## 2020-12-02 PROCEDURE — 99283 EMERGENCY DEPT VISIT LOW MDM: CPT

## 2020-12-02 RX ORDER — ACETAMINOPHEN 500 MG
1000 TABLET ORAL
Status: COMPLETED | OUTPATIENT
Start: 2020-12-02 | End: 2020-12-02

## 2020-12-02 RX ADMIN — ACETAMINOPHEN 1000 MG: 500 TABLET ORAL at 06:12
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
## 2020-12-10 ENCOUNTER — TELEPHONE (OUTPATIENT)
Dept: FAMILY MEDICINE | Facility: CLINIC | Age: 65
End: 2020-12-10

## 2020-12-10 NOTE — TELEPHONE ENCOUNTER
----- Message from Jhonny Brown sent at 12/9/2020  1:11 PM CST -----  Regarding: patient not responding to DEXA exam calls  Please be advised patient has been contacted over 4 times to schedule a DEXA scan with no returned call to schedule.      Thanks!  Jhonny Brown

## 2020-12-22 ENCOUNTER — HOSPITAL ENCOUNTER (OUTPATIENT)
Dept: RADIOLOGY | Facility: HOSPITAL | Age: 65
Discharge: HOME | End: 2020-12-22
Attending: NURSE PRACTITIONER
Payer: MEDICARE

## 2020-12-22 DIAGNOSIS — Z78.0 MENOPAUSE: ICD-10-CM

## 2020-12-22 DIAGNOSIS — Z13.820 OSTEOPOROSIS SCREENING: ICD-10-CM

## 2020-12-22 PROCEDURE — 77080 DXA BONE DENSITY AXIAL: CPT | Mod: TC,PO

## 2020-12-28 ENCOUNTER — TELEPHONE (OUTPATIENT)
Dept: FAMILY MEDICINE | Facility: CLINIC | Age: 65
End: 2020-12-28

## 2020-12-28 NOTE — TELEPHONE ENCOUNTER
Spoke to patient with results verbatim per Priyanka. Verbalized understanding on results including to continue calcium and vitamin D and recheck in 2 years.

## 2020-12-28 NOTE — PROGRESS NOTES
Please call pt and let her know bone density test is stable- shows osteopenia of left hip though bone strength of vertebrae has improved since 2017- recommend continue with calcium/vitamin D supplement and weight bearing exercise. Repeat DEXA 2 years

## 2020-12-28 NOTE — TELEPHONE ENCOUNTER
----- Message from Priyanka Garibay NP sent at 12/28/2020 11:29 AM CST -----  Please call pt and let her know bone density test is stable- shows osteopenia of left hip though bone strength of vertebrae has improved since 2017- recommend continue with calcium/vitamin D supplement and weight bearing exercise. Repeat DEXA 2 years

## 2021-02-25 ENCOUNTER — OFFICE VISIT (OUTPATIENT)
Dept: FAMILY MEDICINE | Facility: CLINIC | Age: 66
End: 2021-02-25
Payer: MEDICARE

## 2021-02-25 VITALS
WEIGHT: 167 LBS | DIASTOLIC BLOOD PRESSURE: 72 MMHG | BODY MASS INDEX: 28.51 KG/M2 | SYSTOLIC BLOOD PRESSURE: 138 MMHG | HEIGHT: 64 IN | HEART RATE: 80 BPM

## 2021-02-25 DIAGNOSIS — E78.5 DM TYPE 2 WITH DIABETIC DYSLIPIDEMIA: Primary | ICD-10-CM

## 2021-02-25 DIAGNOSIS — M51.36 DDD (DEGENERATIVE DISC DISEASE), LUMBAR: ICD-10-CM

## 2021-02-25 DIAGNOSIS — E11.69 DM TYPE 2 WITH DIABETIC DYSLIPIDEMIA: Primary | ICD-10-CM

## 2021-02-25 DIAGNOSIS — I10 ESSENTIAL HYPERTENSION: ICD-10-CM

## 2021-02-25 DIAGNOSIS — E78.5 DYSLIPIDEMIA: ICD-10-CM

## 2021-02-25 LAB — HBA1C MFR BLD: 7.2 %

## 2021-02-25 PROCEDURE — 1101F PR PT FALLS ASSESS DOC 0-1 FALLS W/OUT INJ PAST YR: ICD-10-PCS | Mod: S$GLB,,, | Performed by: NURSE PRACTITIONER

## 2021-02-25 PROCEDURE — 1159F MED LIST DOCD IN RCRD: CPT | Mod: S$GLB,,, | Performed by: NURSE PRACTITIONER

## 2021-02-25 PROCEDURE — 99214 PR OFFICE/OUTPT VISIT, EST, LEVL IV, 30-39 MIN: ICD-10-PCS | Mod: S$GLB,,, | Performed by: NURSE PRACTITIONER

## 2021-02-25 PROCEDURE — 83036 HEMOGLOBIN GLYCOSYLATED A1C: CPT | Mod: QW,,, | Performed by: NURSE PRACTITIONER

## 2021-02-25 PROCEDURE — 3075F SYST BP GE 130 - 139MM HG: CPT | Mod: S$GLB,,, | Performed by: NURSE PRACTITIONER

## 2021-02-25 PROCEDURE — 3008F PR BODY MASS INDEX (BMI) DOCUMENTED: ICD-10-PCS | Mod: S$GLB,,, | Performed by: NURSE PRACTITIONER

## 2021-02-25 PROCEDURE — 83036 POCT HEMOGLOBIN A1C: ICD-10-PCS | Mod: QW,,, | Performed by: NURSE PRACTITIONER

## 2021-02-25 PROCEDURE — 3008F BODY MASS INDEX DOCD: CPT | Mod: S$GLB,,, | Performed by: NURSE PRACTITIONER

## 2021-02-25 PROCEDURE — 3288F PR FALLS RISK ASSESSMENT DOCUMENTED: ICD-10-PCS | Mod: S$GLB,,, | Performed by: NURSE PRACTITIONER

## 2021-02-25 PROCEDURE — 3078F DIAST BP <80 MM HG: CPT | Mod: S$GLB,,, | Performed by: NURSE PRACTITIONER

## 2021-02-25 PROCEDURE — 1101F PT FALLS ASSESS-DOCD LE1/YR: CPT | Mod: S$GLB,,, | Performed by: NURSE PRACTITIONER

## 2021-02-25 PROCEDURE — 3075F PR MOST RECENT SYSTOLIC BLOOD PRESS GE 130-139MM HG: ICD-10-PCS | Mod: S$GLB,,, | Performed by: NURSE PRACTITIONER

## 2021-02-25 PROCEDURE — 3288F FALL RISK ASSESSMENT DOCD: CPT | Mod: S$GLB,,, | Performed by: NURSE PRACTITIONER

## 2021-02-25 PROCEDURE — 3051F HG A1C>EQUAL 7.0%<8.0%: CPT | Mod: S$GLB,,, | Performed by: NURSE PRACTITIONER

## 2021-02-25 PROCEDURE — 3078F PR MOST RECENT DIASTOLIC BLOOD PRESSURE < 80 MM HG: ICD-10-PCS | Mod: S$GLB,,, | Performed by: NURSE PRACTITIONER

## 2021-02-25 PROCEDURE — 3051F PR MOST RECENT HEMOGLOBIN A1C LEVEL 7.0 - < 8.0%: ICD-10-PCS | Mod: S$GLB,,, | Performed by: NURSE PRACTITIONER

## 2021-02-25 PROCEDURE — 99214 OFFICE O/P EST MOD 30 MIN: CPT | Mod: S$GLB,,, | Performed by: NURSE PRACTITIONER

## 2021-02-25 PROCEDURE — 1159F PR MEDICATION LIST DOCUMENTED IN MEDICAL RECORD: ICD-10-PCS | Mod: S$GLB,,, | Performed by: NURSE PRACTITIONER

## 2021-02-25 RX ORDER — TIZANIDINE 4 MG/1
4 TABLET ORAL NIGHTLY PRN
Qty: 90 TABLET | Refills: 1 | Status: SHIPPED | OUTPATIENT
Start: 2021-02-25 | End: 2021-04-22 | Stop reason: SDUPTHER

## 2021-02-25 RX ORDER — GLIPIZIDE 5 MG/1
5 TABLET ORAL 2 TIMES DAILY WITH MEALS
Qty: 135 TABLET | Refills: 1 | Status: SHIPPED | OUTPATIENT
Start: 2021-02-25 | End: 2021-04-22 | Stop reason: SDUPTHER

## 2021-02-25 RX ORDER — SEMAGLUTIDE 1.34 MG/ML
0.75 INJECTION, SOLUTION SUBCUTANEOUS WEEKLY
COMMUNITY
End: 2021-04-07 | Stop reason: SDUPTHER

## 2021-04-07 DIAGNOSIS — E11.69 DM TYPE 2 WITH DIABETIC DYSLIPIDEMIA: Primary | ICD-10-CM

## 2021-04-07 DIAGNOSIS — E78.5 DM TYPE 2 WITH DIABETIC DYSLIPIDEMIA: Primary | ICD-10-CM

## 2021-04-07 RX ORDER — SEMAGLUTIDE 1.34 MG/ML
0.75 INJECTION, SOLUTION SUBCUTANEOUS WEEKLY
Qty: 3 SYRINGE | Refills: 1 | Status: SHIPPED | OUTPATIENT
Start: 2021-04-07 | End: 2022-02-03 | Stop reason: SDUPTHER

## 2021-04-14 ENCOUNTER — TELEPHONE (OUTPATIENT)
Dept: FAMILY MEDICINE | Facility: CLINIC | Age: 66
End: 2021-04-14

## 2021-04-22 DIAGNOSIS — E78.5 DM TYPE 2 WITH DIABETIC DYSLIPIDEMIA: ICD-10-CM

## 2021-04-22 DIAGNOSIS — I10 ESSENTIAL HYPERTENSION: ICD-10-CM

## 2021-04-22 DIAGNOSIS — E11.69 DM TYPE 2 WITH DIABETIC DYSLIPIDEMIA: ICD-10-CM

## 2021-04-22 DIAGNOSIS — E11.65 TYPE 2 DIABETES MELLITUS WITH HYPERGLYCEMIA, WITHOUT LONG-TERM CURRENT USE OF INSULIN: ICD-10-CM

## 2021-04-22 DIAGNOSIS — M51.36 DDD (DEGENERATIVE DISC DISEASE), LUMBAR: ICD-10-CM

## 2021-04-22 RX ORDER — GLIPIZIDE 5 MG/1
5 TABLET ORAL 2 TIMES DAILY WITH MEALS
Qty: 180 TABLET | Refills: 1 | Status: SHIPPED | OUTPATIENT
Start: 2021-04-22 | End: 2022-02-03 | Stop reason: SDUPTHER

## 2021-04-22 RX ORDER — LISINOPRIL AND HYDROCHLOROTHIAZIDE 12.5; 2 MG/1; MG/1
2 TABLET ORAL DAILY
Qty: 180 TABLET | Refills: 1 | Status: SHIPPED | OUTPATIENT
Start: 2021-04-22 | End: 2021-05-27

## 2021-04-22 RX ORDER — INSULIN PUMP SYRINGE, 3 ML
EACH MISCELLANEOUS
Qty: 1 EACH | Refills: 0 | Status: SHIPPED | OUTPATIENT
Start: 2021-04-22 | End: 2021-10-13 | Stop reason: SDUPTHER

## 2021-04-22 RX ORDER — AMLODIPINE BESYLATE 5 MG/1
5 TABLET ORAL DAILY
Qty: 90 TABLET | Refills: 1 | Status: SHIPPED | OUTPATIENT
Start: 2021-04-22 | End: 2021-08-19 | Stop reason: SDUPTHER

## 2021-04-22 RX ORDER — METFORMIN HYDROCHLORIDE 1000 MG/1
1000 TABLET ORAL 2 TIMES DAILY WITH MEALS
Qty: 180 TABLET | Refills: 1 | Status: SHIPPED | OUTPATIENT
Start: 2021-04-22 | End: 2022-02-03 | Stop reason: SDUPTHER

## 2021-04-22 RX ORDER — LANCETS
EACH MISCELLANEOUS
Qty: 180 EACH | Refills: 3 | Status: SHIPPED | OUTPATIENT
Start: 2021-04-22 | End: 2021-10-13 | Stop reason: SDUPTHER

## 2021-04-22 RX ORDER — TIZANIDINE 4 MG/1
4 TABLET ORAL NIGHTLY PRN
Qty: 90 TABLET | Refills: 1 | Status: SHIPPED | OUTPATIENT
Start: 2021-04-22 | End: 2022-12-08 | Stop reason: SDUPTHER

## 2021-04-22 RX ORDER — ISOPROPYL ALCOHOL 70 ML/100ML
1 SWAB TOPICAL DAILY
Qty: 200 EACH | Refills: 1 | Status: SHIPPED | OUTPATIENT
Start: 2021-04-22

## 2021-04-22 RX ORDER — MELOXICAM 15 MG/1
15 TABLET ORAL DAILY
Qty: 90 TABLET | Refills: 1 | Status: SHIPPED | OUTPATIENT
Start: 2021-04-22 | End: 2022-02-03 | Stop reason: SDUPTHER

## 2021-04-29 ENCOUNTER — PATIENT MESSAGE (OUTPATIENT)
Dept: RESEARCH | Facility: HOSPITAL | Age: 66
End: 2021-04-29

## 2021-05-07 ENCOUNTER — TELEPHONE (OUTPATIENT)
Dept: FAMILY MEDICINE | Facility: CLINIC | Age: 66
End: 2021-05-07

## 2021-05-20 ENCOUNTER — TELEPHONE (OUTPATIENT)
Dept: FAMILY MEDICINE | Facility: CLINIC | Age: 66
End: 2021-05-20

## 2021-05-21 ENCOUNTER — TELEPHONE (OUTPATIENT)
Dept: FAMILY MEDICINE | Facility: CLINIC | Age: 66
End: 2021-05-21

## 2021-05-21 LAB
ALBUMIN SERPL-MCNC: 4 G/DL (ref 3.6–5.1)
ALBUMIN/CREAT UR: 3 MCG/MG CREAT
ALBUMIN/GLOB SERPL: 1.4 (CALC) (ref 1–2.5)
ALP SERPL-CCNC: 96 U/L (ref 37–153)
ALT SERPL-CCNC: 16 U/L (ref 6–29)
APPEARANCE UR: CLEAR
AST SERPL-CCNC: 14 U/L (ref 10–35)
BACTERIA #/AREA URNS HPF: ABNORMAL /HPF
BACTERIA UR CULT: NORMAL
BASOPHILS # BLD AUTO: 47 CELLS/UL (ref 0–200)
BASOPHILS NFR BLD AUTO: 0.4 %
BILIRUB SERPL-MCNC: 0.4 MG/DL (ref 0.2–1.2)
BILIRUB UR QL STRIP: NEGATIVE
BUN SERPL-MCNC: 17 MG/DL (ref 7–25)
BUN/CREAT SERPL: 17 (CALC) (ref 6–22)
CALCIUM SERPL-MCNC: 9.4 MG/DL (ref 8.6–10.4)
CHLORIDE SERPL-SCNC: 103 MMOL/L (ref 98–110)
CHOLEST SERPL-MCNC: 130 MG/DL
CHOLEST/HDLC SERPL: 3.4 (CALC)
CO2 SERPL-SCNC: 29 MMOL/L (ref 20–32)
COLOR UR: YELLOW
CREAT SERPL-MCNC: 1 MG/DL (ref 0.5–0.99)
CREAT UR-MCNC: 153 MG/DL (ref 20–275)
EOSINOPHIL # BLD AUTO: 456 CELLS/UL (ref 15–500)
EOSINOPHIL NFR BLD AUTO: 3.9 %
ERYTHROCYTE [DISTWIDTH] IN BLOOD BY AUTOMATED COUNT: 16.5 % (ref 11–15)
GLOBULIN SER CALC-MCNC: 2.8 G/DL (CALC) (ref 1.9–3.7)
GLUCOSE SERPL-MCNC: 119 MG/DL (ref 65–99)
GLUCOSE UR QL STRIP: NEGATIVE
HBA1C MFR BLD: 7.5 % OF TOTAL HGB
HCT VFR BLD AUTO: 35.9 % (ref 35–45)
HDLC SERPL-MCNC: 38 MG/DL
HGB BLD-MCNC: 11.2 G/DL (ref 11.7–15.5)
HGB UR QL STRIP: NEGATIVE
HYALINE CASTS #/AREA URNS LPF: ABNORMAL /LPF
KETONES UR QL STRIP: NEGATIVE
LDLC SERPL CALC-MCNC: 75 MG/DL (CALC)
LEUKOCYTE ESTERASE UR QL STRIP: ABNORMAL
LYMPHOCYTES # BLD AUTO: 2691 CELLS/UL (ref 850–3900)
LYMPHOCYTES NFR BLD AUTO: 23 %
MCH RBC QN AUTO: 21.4 PG (ref 27–33)
MCHC RBC AUTO-ENTMCNC: 31.2 G/DL (ref 32–36)
MCV RBC AUTO: 68.5 FL (ref 80–100)
MICROALBUMIN UR-MCNC: 0.4 MG/DL
MONOCYTES # BLD AUTO: 1030 CELLS/UL (ref 200–950)
MONOCYTES NFR BLD AUTO: 8.8 %
MORPHOLOGY BLD-IMP: NORMAL
NEUTROPHILS # BLD AUTO: 7476 CELLS/UL (ref 1500–7800)
NEUTROPHILS NFR BLD AUTO: 63.9 %
NITRITE UR QL STRIP: NEGATIVE
NONHDLC SERPL-MCNC: 92 MG/DL (CALC)
PH UR STRIP: 6.5 [PH] (ref 5–8)
PLATELET # BLD AUTO: 350 THOUSAND/UL (ref 140–400)
PMV BLD REES-ECKER: 11.3 FL (ref 7.5–12.5)
POTASSIUM SERPL-SCNC: 4.3 MMOL/L (ref 3.5–5.3)
PROT SERPL-MCNC: 6.8 G/DL (ref 6.1–8.1)
PROT UR QL STRIP: NEGATIVE
RBC # BLD AUTO: 5.24 MILLION/UL (ref 3.8–5.1)
RBC #/AREA URNS HPF: ABNORMAL /HPF
SODIUM SERPL-SCNC: 138 MMOL/L (ref 135–146)
SP GR UR STRIP: 1.02 (ref 1–1.03)
SQUAMOUS #/AREA URNS HPF: ABNORMAL /HPF
TRIGL SERPL-MCNC: 91 MG/DL
TSH SERPL-ACNC: 1.27 MIU/L (ref 0.4–4.5)
WBC # BLD AUTO: 11.7 THOUSAND/UL (ref 3.8–10.8)
WBC #/AREA URNS HPF: ABNORMAL /HPF

## 2021-05-27 ENCOUNTER — OFFICE VISIT (OUTPATIENT)
Dept: FAMILY MEDICINE | Facility: CLINIC | Age: 66
End: 2021-05-27
Payer: MEDICARE

## 2021-05-27 VITALS
HEART RATE: 76 BPM | OXYGEN SATURATION: 97 % | HEIGHT: 64 IN | DIASTOLIC BLOOD PRESSURE: 68 MMHG | SYSTOLIC BLOOD PRESSURE: 128 MMHG | BODY MASS INDEX: 28.85 KG/M2 | WEIGHT: 169 LBS

## 2021-05-27 DIAGNOSIS — E78.5 DM TYPE 2 WITH DIABETIC DYSLIPIDEMIA: Primary | ICD-10-CM

## 2021-05-27 DIAGNOSIS — E78.5 DYSLIPIDEMIA: ICD-10-CM

## 2021-05-27 DIAGNOSIS — I10 ESSENTIAL HYPERTENSION: ICD-10-CM

## 2021-05-27 DIAGNOSIS — E11.69 DM TYPE 2 WITH DIABETIC DYSLIPIDEMIA: Primary | ICD-10-CM

## 2021-05-27 PROCEDURE — 3078F PR MOST RECENT DIASTOLIC BLOOD PRESSURE < 80 MM HG: ICD-10-PCS | Mod: S$GLB,,, | Performed by: NURSE PRACTITIONER

## 2021-05-27 PROCEDURE — 3288F FALL RISK ASSESSMENT DOCD: CPT | Mod: S$GLB,,, | Performed by: NURSE PRACTITIONER

## 2021-05-27 PROCEDURE — 3074F PR MOST RECENT SYSTOLIC BLOOD PRESSURE < 130 MM HG: ICD-10-PCS | Mod: S$GLB,,, | Performed by: NURSE PRACTITIONER

## 2021-05-27 PROCEDURE — 99214 PR OFFICE/OUTPT VISIT, EST, LEVL IV, 30-39 MIN: ICD-10-PCS | Mod: S$GLB,,, | Performed by: NURSE PRACTITIONER

## 2021-05-27 PROCEDURE — 3008F BODY MASS INDEX DOCD: CPT | Mod: S$GLB,,, | Performed by: NURSE PRACTITIONER

## 2021-05-27 PROCEDURE — 1159F PR MEDICATION LIST DOCUMENTED IN MEDICAL RECORD: ICD-10-PCS | Mod: S$GLB,,, | Performed by: NURSE PRACTITIONER

## 2021-05-27 PROCEDURE — 3051F PR MOST RECENT HEMOGLOBIN A1C LEVEL 7.0 - < 8.0%: ICD-10-PCS | Mod: S$GLB,,, | Performed by: NURSE PRACTITIONER

## 2021-05-27 PROCEDURE — 3008F PR BODY MASS INDEX (BMI) DOCUMENTED: ICD-10-PCS | Mod: S$GLB,,, | Performed by: NURSE PRACTITIONER

## 2021-05-27 PROCEDURE — 3074F SYST BP LT 130 MM HG: CPT | Mod: S$GLB,,, | Performed by: NURSE PRACTITIONER

## 2021-05-27 PROCEDURE — 99214 OFFICE O/P EST MOD 30 MIN: CPT | Mod: S$GLB,,, | Performed by: NURSE PRACTITIONER

## 2021-05-27 PROCEDURE — 3288F PR FALLS RISK ASSESSMENT DOCUMENTED: ICD-10-PCS | Mod: S$GLB,,, | Performed by: NURSE PRACTITIONER

## 2021-05-27 PROCEDURE — 3051F HG A1C>EQUAL 7.0%<8.0%: CPT | Mod: S$GLB,,, | Performed by: NURSE PRACTITIONER

## 2021-05-27 PROCEDURE — 1101F PR PT FALLS ASSESS DOC 0-1 FALLS W/OUT INJ PAST YR: ICD-10-PCS | Mod: S$GLB,,, | Performed by: NURSE PRACTITIONER

## 2021-05-27 PROCEDURE — 3078F DIAST BP <80 MM HG: CPT | Mod: S$GLB,,, | Performed by: NURSE PRACTITIONER

## 2021-05-27 PROCEDURE — 1159F MED LIST DOCD IN RCRD: CPT | Mod: S$GLB,,, | Performed by: NURSE PRACTITIONER

## 2021-05-27 PROCEDURE — 1101F PT FALLS ASSESS-DOCD LE1/YR: CPT | Mod: S$GLB,,, | Performed by: NURSE PRACTITIONER

## 2021-05-27 RX ORDER — LISINOPRIL 20 MG/1
40 TABLET ORAL DAILY
Qty: 180 TABLET | Refills: 1 | Status: SHIPPED | OUTPATIENT
Start: 2021-05-27 | End: 2022-02-03 | Stop reason: SDUPTHER

## 2021-05-27 RX ORDER — PRAVASTATIN SODIUM 40 MG/1
40 TABLET ORAL DAILY
Qty: 90 TABLET | Refills: 1 | Status: SHIPPED | OUTPATIENT
Start: 2021-05-27 | End: 2021-08-19 | Stop reason: SDUPTHER

## 2021-05-27 RX ORDER — METOPROLOL SUCCINATE 50 MG/1
50 TABLET, EXTENDED RELEASE ORAL DAILY
COMMUNITY
Start: 2021-04-30 | End: 2022-02-03 | Stop reason: SDUPTHER

## 2021-05-27 RX ORDER — EMPAGLIFLOZIN 10 MG/1
10 TABLET, FILM COATED ORAL DAILY
Qty: 28 TABLET | Refills: 0 | COMMUNITY
Start: 2021-05-27 | End: 2021-09-14 | Stop reason: SDUPTHER

## 2021-06-02 ENCOUNTER — TELEPHONE (OUTPATIENT)
Dept: FAMILY MEDICINE | Facility: CLINIC | Age: 66
End: 2021-06-02

## 2021-07-22 ENCOUNTER — TELEPHONE (OUTPATIENT)
Dept: FAMILY MEDICINE | Facility: CLINIC | Age: 66
End: 2021-07-22

## 2021-07-22 DIAGNOSIS — E11.69 DM TYPE 2 WITH DIABETIC DYSLIPIDEMIA: Primary | ICD-10-CM

## 2021-07-22 DIAGNOSIS — E78.5 DM TYPE 2 WITH DIABETIC DYSLIPIDEMIA: Primary | ICD-10-CM

## 2021-07-26 ENCOUNTER — TELEPHONE (OUTPATIENT)
Dept: FAMILY MEDICINE | Facility: CLINIC | Age: 66
End: 2021-07-26

## 2021-08-11 ENCOUNTER — OFFICE VISIT (OUTPATIENT)
Dept: PODIATRY | Facility: CLINIC | Age: 66
End: 2021-08-11
Payer: MEDICARE

## 2021-08-11 ENCOUNTER — HOSPITAL ENCOUNTER (OUTPATIENT)
Dept: RADIOLOGY | Facility: CLINIC | Age: 66
Discharge: HOME | End: 2021-08-11
Attending: PODIATRIST
Payer: MEDICARE

## 2021-08-11 VITALS — HEART RATE: 88 BPM | HEIGHT: 64 IN | BODY MASS INDEX: 29.01 KG/M2 | OXYGEN SATURATION: 97 %

## 2021-08-11 DIAGNOSIS — E11.69 DM TYPE 2 WITH DIABETIC DYSLIPIDEMIA: Primary | ICD-10-CM

## 2021-08-11 DIAGNOSIS — M79.671 RIGHT FOOT PAIN: ICD-10-CM

## 2021-08-11 DIAGNOSIS — S92.424A CLOSED NONDISPLACED FRACTURE OF DISTAL PHALANX OF RIGHT GREAT TOE, INITIAL ENCOUNTER: ICD-10-CM

## 2021-08-11 DIAGNOSIS — E78.5 DM TYPE 2 WITH DIABETIC DYSLIPIDEMIA: Primary | ICD-10-CM

## 2021-08-11 DIAGNOSIS — L60.3 ONYCHODYSTROPHY: ICD-10-CM

## 2021-08-11 DIAGNOSIS — S99.921A INJURY OF RIGHT FOOT, INITIAL ENCOUNTER: ICD-10-CM

## 2021-08-11 DIAGNOSIS — M79.674 PAIN OF TOE OF RIGHT FOOT: ICD-10-CM

## 2021-08-11 PROCEDURE — 1101F PR PT FALLS ASSESS DOC 0-1 FALLS W/OUT INJ PAST YR: ICD-10-PCS | Mod: CPTII,S$GLB,, | Performed by: PODIATRIST

## 2021-08-11 PROCEDURE — 1101F PT FALLS ASSESS-DOCD LE1/YR: CPT | Mod: CPTII,S$GLB,, | Performed by: PODIATRIST

## 2021-08-11 PROCEDURE — 1159F PR MEDICATION LIST DOCUMENTED IN MEDICAL RECORD: ICD-10-PCS | Mod: CPTII,S$GLB,, | Performed by: PODIATRIST

## 2021-08-11 PROCEDURE — 99204 OFFICE O/P NEW MOD 45 MIN: CPT | Mod: S$GLB,,, | Performed by: PODIATRIST

## 2021-08-11 PROCEDURE — 3051F PR MOST RECENT HEMOGLOBIN A1C LEVEL 7.0 - < 8.0%: ICD-10-PCS | Mod: CPTII,S$GLB,, | Performed by: PODIATRIST

## 2021-08-11 PROCEDURE — 3288F PR FALLS RISK ASSESSMENT DOCUMENTED: ICD-10-PCS | Mod: CPTII,S$GLB,, | Performed by: PODIATRIST

## 2021-08-11 PROCEDURE — 1125F PR PAIN SEVERITY QUANTIFIED, PAIN PRESENT: ICD-10-PCS | Mod: CPTII,S$GLB,, | Performed by: PODIATRIST

## 2021-08-11 PROCEDURE — 1159F MED LIST DOCD IN RCRD: CPT | Mod: CPTII,S$GLB,, | Performed by: PODIATRIST

## 2021-08-11 PROCEDURE — 1125F AMNT PAIN NOTED PAIN PRSNT: CPT | Mod: CPTII,S$GLB,, | Performed by: PODIATRIST

## 2021-08-11 PROCEDURE — 99204 PR OFFICE/OUTPT VISIT, NEW, LEVL IV, 45-59 MIN: ICD-10-PCS | Mod: S$GLB,,, | Performed by: PODIATRIST

## 2021-08-11 PROCEDURE — 1160F PR REVIEW ALL MEDS BY PRESCRIBER/CLIN PHARMACIST DOCUMENTED: ICD-10-PCS | Mod: CPTII,S$GLB,, | Performed by: PODIATRIST

## 2021-08-11 PROCEDURE — 3288F FALL RISK ASSESSMENT DOCD: CPT | Mod: CPTII,S$GLB,, | Performed by: PODIATRIST

## 2021-08-11 PROCEDURE — 3051F HG A1C>EQUAL 7.0%<8.0%: CPT | Mod: CPTII,S$GLB,, | Performed by: PODIATRIST

## 2021-08-11 PROCEDURE — 73630 XR FOOT COMPLETE 3 VIEW RIGHT: ICD-10-PCS | Mod: RT,S$GLB,, | Performed by: RADIOLOGY

## 2021-08-11 PROCEDURE — 3008F BODY MASS INDEX DOCD: CPT | Mod: CPTII,S$GLB,, | Performed by: PODIATRIST

## 2021-08-11 PROCEDURE — 1160F RVW MEDS BY RX/DR IN RCRD: CPT | Mod: CPTII,S$GLB,, | Performed by: PODIATRIST

## 2021-08-11 PROCEDURE — 73630 X-RAY EXAM OF FOOT: CPT | Mod: RT,S$GLB,, | Performed by: RADIOLOGY

## 2021-08-11 PROCEDURE — 3008F PR BODY MASS INDEX (BMI) DOCUMENTED: ICD-10-PCS | Mod: CPTII,S$GLB,, | Performed by: PODIATRIST

## 2021-08-11 RX ORDER — LISINOPRIL AND HYDROCHLOROTHIAZIDE 12.5; 2 MG/1; MG/1
TABLET ORAL
COMMUNITY
Start: 2021-07-22 | End: 2022-02-03

## 2021-08-13 ENCOUNTER — TELEPHONE (OUTPATIENT)
Dept: FAMILY MEDICINE | Facility: CLINIC | Age: 66
End: 2021-08-13

## 2021-08-17 LAB
BUN SERPL-MCNC: 17 MG/DL (ref 7–25)
BUN/CREAT SERPL: 14 (CALC) (ref 6–22)
CALCIUM SERPL-MCNC: 9.2 MG/DL (ref 8.6–10.4)
CHLORIDE SERPL-SCNC: 103 MMOL/L (ref 98–110)
CO2 SERPL-SCNC: 28 MMOL/L (ref 20–32)
CREAT SERPL-MCNC: 1.23 MG/DL (ref 0.5–0.99)
GLUCOSE SERPL-MCNC: 152 MG/DL (ref 65–99)
HBA1C MFR BLD: 7.2 % OF TOTAL HGB
POTASSIUM SERPL-SCNC: 4.1 MMOL/L (ref 3.5–5.3)
SODIUM SERPL-SCNC: 140 MMOL/L (ref 135–146)

## 2021-08-19 ENCOUNTER — OFFICE VISIT (OUTPATIENT)
Dept: FAMILY MEDICINE | Facility: CLINIC | Age: 66
End: 2021-08-19
Payer: MEDICARE

## 2021-08-19 VITALS
HEIGHT: 64 IN | BODY MASS INDEX: 29.02 KG/M2 | DIASTOLIC BLOOD PRESSURE: 68 MMHG | SYSTOLIC BLOOD PRESSURE: 134 MMHG | HEART RATE: 78 BPM | WEIGHT: 170 LBS

## 2021-08-19 DIAGNOSIS — I10 ESSENTIAL HYPERTENSION: ICD-10-CM

## 2021-08-19 DIAGNOSIS — Z12.31 SCREENING MAMMOGRAM, ENCOUNTER FOR: ICD-10-CM

## 2021-08-19 DIAGNOSIS — N18.31 STAGE 3A CHRONIC KIDNEY DISEASE: ICD-10-CM

## 2021-08-19 DIAGNOSIS — E78.5 DYSLIPIDEMIA: ICD-10-CM

## 2021-08-19 DIAGNOSIS — E78.5 DM TYPE 2 WITH DIABETIC DYSLIPIDEMIA: Primary | ICD-10-CM

## 2021-08-19 DIAGNOSIS — E11.69 DM TYPE 2 WITH DIABETIC DYSLIPIDEMIA: Primary | ICD-10-CM

## 2021-08-19 PROCEDURE — 3078F DIAST BP <80 MM HG: CPT | Mod: S$GLB,,, | Performed by: NURSE PRACTITIONER

## 2021-08-19 PROCEDURE — 1160F PR REVIEW ALL MEDS BY PRESCRIBER/CLIN PHARMACIST DOCUMENTED: ICD-10-PCS | Mod: S$GLB,,, | Performed by: NURSE PRACTITIONER

## 2021-08-19 PROCEDURE — 3288F PR FALLS RISK ASSESSMENT DOCUMENTED: ICD-10-PCS | Mod: S$GLB,,, | Performed by: NURSE PRACTITIONER

## 2021-08-19 PROCEDURE — 1159F MED LIST DOCD IN RCRD: CPT | Mod: S$GLB,,, | Performed by: NURSE PRACTITIONER

## 2021-08-19 PROCEDURE — 3008F BODY MASS INDEX DOCD: CPT | Mod: S$GLB,,, | Performed by: NURSE PRACTITIONER

## 2021-08-19 PROCEDURE — 3078F PR MOST RECENT DIASTOLIC BLOOD PRESSURE < 80 MM HG: ICD-10-PCS | Mod: S$GLB,,, | Performed by: NURSE PRACTITIONER

## 2021-08-19 PROCEDURE — 1159F PR MEDICATION LIST DOCUMENTED IN MEDICAL RECORD: ICD-10-PCS | Mod: S$GLB,,, | Performed by: NURSE PRACTITIONER

## 2021-08-19 PROCEDURE — 99214 PR OFFICE/OUTPT VISIT, EST, LEVL IV, 30-39 MIN: ICD-10-PCS | Mod: S$GLB,,, | Performed by: NURSE PRACTITIONER

## 2021-08-19 PROCEDURE — 3051F HG A1C>EQUAL 7.0%<8.0%: CPT | Mod: S$GLB,,, | Performed by: NURSE PRACTITIONER

## 2021-08-19 PROCEDURE — 1160F RVW MEDS BY RX/DR IN RCRD: CPT | Mod: S$GLB,,, | Performed by: NURSE PRACTITIONER

## 2021-08-19 PROCEDURE — 1101F PT FALLS ASSESS-DOCD LE1/YR: CPT | Mod: S$GLB,,, | Performed by: NURSE PRACTITIONER

## 2021-08-19 PROCEDURE — 3051F PR MOST RECENT HEMOGLOBIN A1C LEVEL 7.0 - < 8.0%: ICD-10-PCS | Mod: S$GLB,,, | Performed by: NURSE PRACTITIONER

## 2021-08-19 PROCEDURE — 3075F PR MOST RECENT SYSTOLIC BLOOD PRESS GE 130-139MM HG: ICD-10-PCS | Mod: S$GLB,,, | Performed by: NURSE PRACTITIONER

## 2021-08-19 PROCEDURE — 3288F FALL RISK ASSESSMENT DOCD: CPT | Mod: S$GLB,,, | Performed by: NURSE PRACTITIONER

## 2021-08-19 PROCEDURE — 1101F PR PT FALLS ASSESS DOC 0-1 FALLS W/OUT INJ PAST YR: ICD-10-PCS | Mod: S$GLB,,, | Performed by: NURSE PRACTITIONER

## 2021-08-19 PROCEDURE — 99214 OFFICE O/P EST MOD 30 MIN: CPT | Mod: S$GLB,,, | Performed by: NURSE PRACTITIONER

## 2021-08-19 PROCEDURE — 3008F PR BODY MASS INDEX (BMI) DOCUMENTED: ICD-10-PCS | Mod: S$GLB,,, | Performed by: NURSE PRACTITIONER

## 2021-08-19 PROCEDURE — 3075F SYST BP GE 130 - 139MM HG: CPT | Mod: S$GLB,,, | Performed by: NURSE PRACTITIONER

## 2021-08-19 RX ORDER — PRAVASTATIN SODIUM 40 MG/1
40 TABLET ORAL DAILY
Qty: 90 TABLET | Refills: 1 | Status: SHIPPED | OUTPATIENT
Start: 2021-08-19 | End: 2022-02-03 | Stop reason: SDUPTHER

## 2021-08-19 RX ORDER — AMLODIPINE BESYLATE 5 MG/1
5 TABLET ORAL DAILY
Qty: 90 TABLET | Refills: 1 | Status: SHIPPED | OUTPATIENT
Start: 2021-08-19 | End: 2021-09-14 | Stop reason: SDUPTHER

## 2021-09-14 DIAGNOSIS — E78.5 DM TYPE 2 WITH DIABETIC DYSLIPIDEMIA: ICD-10-CM

## 2021-09-14 DIAGNOSIS — E11.69 DM TYPE 2 WITH DIABETIC DYSLIPIDEMIA: ICD-10-CM

## 2021-09-14 DIAGNOSIS — I10 ESSENTIAL HYPERTENSION: ICD-10-CM

## 2021-09-14 RX ORDER — AMLODIPINE BESYLATE 5 MG/1
5 TABLET ORAL DAILY
Qty: 90 TABLET | Refills: 1 | Status: SHIPPED | OUTPATIENT
Start: 2021-09-14 | End: 2022-02-03 | Stop reason: SDUPTHER

## 2021-09-14 RX ORDER — EMPAGLIFLOZIN 10 MG/1
10 TABLET, FILM COATED ORAL DAILY
Qty: 90 TABLET | Refills: 1 | Status: SHIPPED | OUTPATIENT
Start: 2021-09-14 | End: 2021-11-11 | Stop reason: DRUGHIGH

## 2021-09-15 ENCOUNTER — TELEPHONE (OUTPATIENT)
Dept: FAMILY MEDICINE | Facility: CLINIC | Age: 66
End: 2021-09-15

## 2021-09-23 ENCOUNTER — HOSPITAL ENCOUNTER (OUTPATIENT)
Dept: RADIOLOGY | Facility: HOSPITAL | Age: 66
Discharge: HOME | End: 2021-09-23
Attending: NURSE PRACTITIONER
Payer: MEDICARE

## 2021-09-23 DIAGNOSIS — Z12.31 SCREENING MAMMOGRAM, ENCOUNTER FOR: ICD-10-CM

## 2021-09-23 PROCEDURE — 77067 SCR MAMMO BI INCL CAD: CPT | Mod: TC,PO

## 2021-10-13 DIAGNOSIS — E11.65 TYPE 2 DIABETES MELLITUS WITH HYPERGLYCEMIA, WITHOUT LONG-TERM CURRENT USE OF INSULIN: ICD-10-CM

## 2021-10-13 RX ORDER — INSULIN PUMP SYRINGE, 3 ML
EACH MISCELLANEOUS
Qty: 1 EACH | Refills: 0 | Status: SHIPPED | OUTPATIENT
Start: 2021-10-13 | End: 2022-10-13

## 2021-10-13 RX ORDER — LANCETS
EACH MISCELLANEOUS
Qty: 180 EACH | Refills: 3 | Status: SHIPPED | OUTPATIENT
Start: 2021-10-13

## 2021-10-15 ENCOUNTER — TELEPHONE (OUTPATIENT)
Dept: FAMILY MEDICINE | Facility: CLINIC | Age: 66
End: 2021-10-15

## 2021-10-27 LAB
ALBUMIN SERPL-MCNC: 4 G/DL (ref 3.6–5.1)
ALBUMIN/GLOB SERPL: 1.4 (CALC) (ref 1–2.5)
ALP SERPL-CCNC: 106 U/L (ref 37–153)
ALT SERPL-CCNC: 9 U/L (ref 6–29)
AST SERPL-CCNC: 11 U/L (ref 10–35)
BILIRUB SERPL-MCNC: 0.3 MG/DL (ref 0.2–1.2)
BUN SERPL-MCNC: 20 MG/DL (ref 7–25)
BUN/CREAT SERPL: 18 (CALC) (ref 6–22)
CALCIUM SERPL-MCNC: 9.3 MG/DL (ref 8.6–10.4)
CHLORIDE SERPL-SCNC: 106 MMOL/L (ref 98–110)
CHOLEST SERPL-MCNC: 128 MG/DL
CHOLEST/HDLC SERPL: 3.7 (CALC)
CO2 SERPL-SCNC: 28 MMOL/L (ref 20–32)
CREAT SERPL-MCNC: 1.1 MG/DL (ref 0.5–0.99)
GLOBULIN SER CALC-MCNC: 2.8 G/DL (CALC) (ref 1.9–3.7)
GLUCOSE SERPL-MCNC: 141 MG/DL (ref 65–99)
HBA1C MFR BLD: 8.1 % OF TOTAL HGB
HDLC SERPL-MCNC: 35 MG/DL
LDLC SERPL CALC-MCNC: 74 MG/DL (CALC)
NONHDLC SERPL-MCNC: 93 MG/DL (CALC)
POTASSIUM SERPL-SCNC: 4.2 MMOL/L (ref 3.5–5.3)
PROT SERPL-MCNC: 6.8 G/DL (ref 6.1–8.1)
SODIUM SERPL-SCNC: 140 MMOL/L (ref 135–146)
TRIGL SERPL-MCNC: 102 MG/DL

## 2021-11-11 ENCOUNTER — OFFICE VISIT (OUTPATIENT)
Dept: FAMILY MEDICINE | Facility: CLINIC | Age: 66
End: 2021-11-11
Payer: MEDICARE

## 2021-11-11 VITALS
HEART RATE: 78 BPM | HEIGHT: 64 IN | SYSTOLIC BLOOD PRESSURE: 134 MMHG | BODY MASS INDEX: 28.14 KG/M2 | DIASTOLIC BLOOD PRESSURE: 72 MMHG | OXYGEN SATURATION: 99 % | WEIGHT: 164.81 LBS

## 2021-11-11 DIAGNOSIS — E78.5 DYSLIPIDEMIA: ICD-10-CM

## 2021-11-11 DIAGNOSIS — E11.42 DIABETIC POLYNEUROPATHY ASSOCIATED WITH TYPE 2 DIABETES MELLITUS: Primary | ICD-10-CM

## 2021-11-11 DIAGNOSIS — I10 ESSENTIAL HYPERTENSION: ICD-10-CM

## 2021-11-11 DIAGNOSIS — N18.31 STAGE 3A CHRONIC KIDNEY DISEASE: ICD-10-CM

## 2021-11-11 PROCEDURE — 1160F PR REVIEW ALL MEDS BY PRESCRIBER/CLIN PHARMACIST DOCUMENTED: ICD-10-PCS | Mod: S$GLB,,, | Performed by: NURSE PRACTITIONER

## 2021-11-11 PROCEDURE — 3008F BODY MASS INDEX DOCD: CPT | Mod: S$GLB,,, | Performed by: NURSE PRACTITIONER

## 2021-11-11 PROCEDURE — 3052F HG A1C>EQUAL 8.0%<EQUAL 9.0%: CPT | Mod: S$GLB,,, | Performed by: NURSE PRACTITIONER

## 2021-11-11 PROCEDURE — 1101F PR PT FALLS ASSESS DOC 0-1 FALLS W/OUT INJ PAST YR: ICD-10-PCS | Mod: S$GLB,,, | Performed by: NURSE PRACTITIONER

## 2021-11-11 PROCEDURE — 3066F PR DOCUMENTATION OF TREATMENT FOR NEPHROPATHY: ICD-10-PCS | Mod: S$GLB,,, | Performed by: NURSE PRACTITIONER

## 2021-11-11 PROCEDURE — 3052F PR MOST RECENT HEMOGLOBIN A1C LEVEL 8.0 - < 9.0%: ICD-10-PCS | Mod: S$GLB,,, | Performed by: NURSE PRACTITIONER

## 2021-11-11 PROCEDURE — 3066F NEPHROPATHY DOC TX: CPT | Mod: S$GLB,,, | Performed by: NURSE PRACTITIONER

## 2021-11-11 PROCEDURE — 3078F PR MOST RECENT DIASTOLIC BLOOD PRESSURE < 80 MM HG: ICD-10-PCS | Mod: S$GLB,,, | Performed by: NURSE PRACTITIONER

## 2021-11-11 PROCEDURE — 3008F PR BODY MASS INDEX (BMI) DOCUMENTED: ICD-10-PCS | Mod: S$GLB,,, | Performed by: NURSE PRACTITIONER

## 2021-11-11 PROCEDURE — 3061F PR NEG MICROALBUMINURIA RESULT DOCUMENTED/REVIEW: ICD-10-PCS | Mod: S$GLB,,, | Performed by: NURSE PRACTITIONER

## 2021-11-11 PROCEDURE — 99214 OFFICE O/P EST MOD 30 MIN: CPT | Mod: S$GLB,,, | Performed by: NURSE PRACTITIONER

## 2021-11-11 PROCEDURE — 3075F SYST BP GE 130 - 139MM HG: CPT | Mod: S$GLB,,, | Performed by: NURSE PRACTITIONER

## 2021-11-11 PROCEDURE — 3288F FALL RISK ASSESSMENT DOCD: CPT | Mod: S$GLB,,, | Performed by: NURSE PRACTITIONER

## 2021-11-11 PROCEDURE — 3075F PR MOST RECENT SYSTOLIC BLOOD PRESS GE 130-139MM HG: ICD-10-PCS | Mod: S$GLB,,, | Performed by: NURSE PRACTITIONER

## 2021-11-11 PROCEDURE — 4010F ACE/ARB THERAPY RXD/TAKEN: CPT | Mod: S$GLB,,, | Performed by: NURSE PRACTITIONER

## 2021-11-11 PROCEDURE — 1159F PR MEDICATION LIST DOCUMENTED IN MEDICAL RECORD: ICD-10-PCS | Mod: S$GLB,,, | Performed by: NURSE PRACTITIONER

## 2021-11-11 PROCEDURE — 3078F DIAST BP <80 MM HG: CPT | Mod: S$GLB,,, | Performed by: NURSE PRACTITIONER

## 2021-11-11 PROCEDURE — 3288F PR FALLS RISK ASSESSMENT DOCUMENTED: ICD-10-PCS | Mod: S$GLB,,, | Performed by: NURSE PRACTITIONER

## 2021-11-11 PROCEDURE — 3061F NEG MICROALBUMINURIA REV: CPT | Mod: S$GLB,,, | Performed by: NURSE PRACTITIONER

## 2021-11-11 PROCEDURE — 1160F RVW MEDS BY RX/DR IN RCRD: CPT | Mod: S$GLB,,, | Performed by: NURSE PRACTITIONER

## 2021-11-11 PROCEDURE — 4010F PR ACE/ARB THEARPY RXD/TAKEN: ICD-10-PCS | Mod: S$GLB,,, | Performed by: NURSE PRACTITIONER

## 2021-11-11 PROCEDURE — 1101F PT FALLS ASSESS-DOCD LE1/YR: CPT | Mod: S$GLB,,, | Performed by: NURSE PRACTITIONER

## 2021-11-11 PROCEDURE — 1159F MED LIST DOCD IN RCRD: CPT | Mod: S$GLB,,, | Performed by: NURSE PRACTITIONER

## 2021-11-11 PROCEDURE — 99214 PR OFFICE/OUTPT VISIT, EST, LEVL IV, 30-39 MIN: ICD-10-PCS | Mod: S$GLB,,, | Performed by: NURSE PRACTITIONER

## 2021-11-11 RX ORDER — EMPAGLIFLOZIN 25 MG/1
25 TABLET, FILM COATED ORAL DAILY
Qty: 30 TABLET | Refills: 5 | Status: SHIPPED | OUTPATIENT
Start: 2021-11-11 | End: 2022-02-03 | Stop reason: SDUPTHER

## 2022-02-03 ENCOUNTER — OFFICE VISIT (OUTPATIENT)
Dept: FAMILY MEDICINE | Facility: CLINIC | Age: 67
End: 2022-02-03
Payer: COMMERCIAL

## 2022-02-03 VITALS
HEIGHT: 64 IN | SYSTOLIC BLOOD PRESSURE: 128 MMHG | DIASTOLIC BLOOD PRESSURE: 74 MMHG | BODY MASS INDEX: 27.83 KG/M2 | WEIGHT: 163 LBS | HEART RATE: 70 BPM

## 2022-02-03 DIAGNOSIS — E78.5 DYSLIPIDEMIA: ICD-10-CM

## 2022-02-03 DIAGNOSIS — M51.36 DDD (DEGENERATIVE DISC DISEASE), LUMBAR: ICD-10-CM

## 2022-02-03 DIAGNOSIS — K21.9 GASTROESOPHAGEAL REFLUX DISEASE WITHOUT ESOPHAGITIS: ICD-10-CM

## 2022-02-03 DIAGNOSIS — I10 ESSENTIAL HYPERTENSION: ICD-10-CM

## 2022-02-03 DIAGNOSIS — E78.5 DM TYPE 2 WITH DIABETIC DYSLIPIDEMIA: Primary | ICD-10-CM

## 2022-02-03 DIAGNOSIS — E11.69 DM TYPE 2 WITH DIABETIC DYSLIPIDEMIA: Primary | ICD-10-CM

## 2022-02-03 LAB — HBA1C MFR BLD: 7.5 %

## 2022-02-03 PROCEDURE — 3074F SYST BP LT 130 MM HG: CPT | Mod: S$GLB,,, | Performed by: NURSE PRACTITIONER

## 2022-02-03 PROCEDURE — 3074F PR MOST RECENT SYSTOLIC BLOOD PRESSURE < 130 MM HG: ICD-10-PCS | Mod: S$GLB,,, | Performed by: NURSE PRACTITIONER

## 2022-02-03 PROCEDURE — 1160F PR REVIEW ALL MEDS BY PRESCRIBER/CLIN PHARMACIST DOCUMENTED: ICD-10-PCS | Mod: S$GLB,,, | Performed by: NURSE PRACTITIONER

## 2022-02-03 PROCEDURE — 3078F DIAST BP <80 MM HG: CPT | Mod: S$GLB,,, | Performed by: NURSE PRACTITIONER

## 2022-02-03 PROCEDURE — 1101F PT FALLS ASSESS-DOCD LE1/YR: CPT | Mod: S$GLB,,, | Performed by: NURSE PRACTITIONER

## 2022-02-03 PROCEDURE — 3008F BODY MASS INDEX DOCD: CPT | Mod: S$GLB,,, | Performed by: NURSE PRACTITIONER

## 2022-02-03 PROCEDURE — 3008F PR BODY MASS INDEX (BMI) DOCUMENTED: ICD-10-PCS | Mod: S$GLB,,, | Performed by: NURSE PRACTITIONER

## 2022-02-03 PROCEDURE — 4010F ACE/ARB THERAPY RXD/TAKEN: CPT | Mod: S$GLB,,, | Performed by: NURSE PRACTITIONER

## 2022-02-03 PROCEDURE — 1159F MED LIST DOCD IN RCRD: CPT | Mod: S$GLB,,, | Performed by: NURSE PRACTITIONER

## 2022-02-03 PROCEDURE — 3052F PR MOST RECENT HEMOGLOBIN A1C LEVEL 8.0 - < 9.0%: ICD-10-PCS | Mod: S$GLB,,, | Performed by: NURSE PRACTITIONER

## 2022-02-03 PROCEDURE — 3052F HG A1C>EQUAL 8.0%<EQUAL 9.0%: CPT | Mod: S$GLB,,, | Performed by: NURSE PRACTITIONER

## 2022-02-03 PROCEDURE — 83036 POCT HEMOGLOBIN A1C: ICD-10-PCS | Mod: QW,,, | Performed by: NURSE PRACTITIONER

## 2022-02-03 PROCEDURE — 3288F PR FALLS RISK ASSESSMENT DOCUMENTED: ICD-10-PCS | Mod: S$GLB,,, | Performed by: NURSE PRACTITIONER

## 2022-02-03 PROCEDURE — 83036 HEMOGLOBIN GLYCOSYLATED A1C: CPT | Mod: QW,,, | Performed by: NURSE PRACTITIONER

## 2022-02-03 PROCEDURE — 1160F RVW MEDS BY RX/DR IN RCRD: CPT | Mod: S$GLB,,, | Performed by: NURSE PRACTITIONER

## 2022-02-03 PROCEDURE — 1159F PR MEDICATION LIST DOCUMENTED IN MEDICAL RECORD: ICD-10-PCS | Mod: S$GLB,,, | Performed by: NURSE PRACTITIONER

## 2022-02-03 PROCEDURE — 99214 OFFICE O/P EST MOD 30 MIN: CPT | Mod: S$GLB,,, | Performed by: NURSE PRACTITIONER

## 2022-02-03 PROCEDURE — 4010F PR ACE/ARB THEARPY RXD/TAKEN: ICD-10-PCS | Mod: S$GLB,,, | Performed by: NURSE PRACTITIONER

## 2022-02-03 PROCEDURE — 1101F PR PT FALLS ASSESS DOC 0-1 FALLS W/OUT INJ PAST YR: ICD-10-PCS | Mod: S$GLB,,, | Performed by: NURSE PRACTITIONER

## 2022-02-03 PROCEDURE — 3078F PR MOST RECENT DIASTOLIC BLOOD PRESSURE < 80 MM HG: ICD-10-PCS | Mod: S$GLB,,, | Performed by: NURSE PRACTITIONER

## 2022-02-03 PROCEDURE — 3288F FALL RISK ASSESSMENT DOCD: CPT | Mod: S$GLB,,, | Performed by: NURSE PRACTITIONER

## 2022-02-03 PROCEDURE — 99214 PR OFFICE/OUTPT VISIT, EST, LEVL IV, 30-39 MIN: ICD-10-PCS | Mod: S$GLB,,, | Performed by: NURSE PRACTITIONER

## 2022-02-03 RX ORDER — PRAVASTATIN SODIUM 40 MG/1
40 TABLET ORAL DAILY
Qty: 90 TABLET | Refills: 1 | Status: SHIPPED | OUTPATIENT
Start: 2022-02-03 | End: 2022-03-07 | Stop reason: SDUPTHER

## 2022-02-03 RX ORDER — EMPAGLIFLOZIN 25 MG/1
25 TABLET, FILM COATED ORAL DAILY
Qty: 90 TABLET | Refills: 1 | Status: SHIPPED | OUTPATIENT
Start: 2022-02-03 | End: 2022-06-06 | Stop reason: SDUPTHER

## 2022-02-03 RX ORDER — METFORMIN HYDROCHLORIDE 1000 MG/1
1000 TABLET ORAL 2 TIMES DAILY WITH MEALS
Qty: 180 TABLET | Refills: 1 | Status: SHIPPED | OUTPATIENT
Start: 2022-02-03 | End: 2022-12-08 | Stop reason: SDUPTHER

## 2022-02-03 RX ORDER — MELOXICAM 15 MG/1
15 TABLET ORAL DAILY
Qty: 90 TABLET | Refills: 1 | Status: SHIPPED | OUTPATIENT
Start: 2022-02-03 | End: 2022-06-07 | Stop reason: SDUPTHER

## 2022-02-03 RX ORDER — SEMAGLUTIDE 1.34 MG/ML
1 INJECTION, SOLUTION SUBCUTANEOUS WEEKLY
Qty: 3 PEN | Refills: 1 | Status: SHIPPED | OUTPATIENT
Start: 2022-02-03 | End: 2022-03-07 | Stop reason: SDUPTHER

## 2022-02-03 RX ORDER — LISINOPRIL 20 MG/1
40 TABLET ORAL DAILY
Qty: 180 TABLET | Refills: 1 | Status: SHIPPED | OUTPATIENT
Start: 2022-02-03 | End: 2022-06-06 | Stop reason: SDUPTHER

## 2022-02-03 RX ORDER — AMLODIPINE BESYLATE 5 MG/1
5 TABLET ORAL DAILY
Qty: 90 TABLET | Refills: 1 | Status: SHIPPED | OUTPATIENT
Start: 2022-02-03 | End: 2022-05-02 | Stop reason: SDUPTHER

## 2022-02-03 RX ORDER — OMEPRAZOLE 20 MG/1
20 CAPSULE, DELAYED RELEASE ORAL DAILY
Qty: 90 CAPSULE | Refills: 1 | Status: SHIPPED | OUTPATIENT
Start: 2022-02-03 | End: 2022-12-08 | Stop reason: SDUPTHER

## 2022-02-03 RX ORDER — BIMATOPROST 0.1 MG/ML
SOLUTION/ DROPS OPHTHALMIC
COMMUNITY
Start: 2021-09-25 | End: 2024-02-27

## 2022-02-03 RX ORDER — GLIPIZIDE 5 MG/1
5 TABLET ORAL 2 TIMES DAILY WITH MEALS
Qty: 180 TABLET | Refills: 1 | Status: SHIPPED | OUTPATIENT
Start: 2022-02-03 | End: 2022-06-06 | Stop reason: SDUPTHER

## 2022-02-03 RX ORDER — METOPROLOL SUCCINATE 50 MG/1
50 TABLET, EXTENDED RELEASE ORAL DAILY
Qty: 90 TABLET | Refills: 1 | Status: SHIPPED | OUTPATIENT
Start: 2022-02-03 | End: 2022-12-08 | Stop reason: SDUPTHER

## 2022-02-03 NOTE — PROGRESS NOTES
SUBJECTIVE:    Patient ID: Rhonda Mojica is a 66 y.o. female.    Chief Complaint: Follow-up (No bottles, reviewed from list, eye exam -[facility] 2020, vaccine denied//dp)    Pt here for regular DM f/u- at last visit jardiance dose increased to 25mg daily. Reports tolerating increased dose without UTI or vaginal yeast infection. Reports FBS ranging 130-160  Pt reports overall feeling well, no c/o's  Had f/u with Dr. Varner last week- no changes made      Office Visit on 08/19/2021   Component Date Value Ref Range Status    Hemoglobin A1C 10/26/2021 8.1* <5.7 % of total Hgb Final    Cholesterol 10/26/2021 128  <200 mg/dL Final    HDL 10/26/2021 35* > OR = 50 mg/dL Final    Triglycerides 10/26/2021 102  <150 mg/dL Final    LDL Cholesterol 10/26/2021 74  mg/dL (calc) Final    HDL/Cholesterol Ratio 10/26/2021 3.7  <5.0 (calc) Final    Non HDL Chol. (LDL+VLDL) 10/26/2021 93  <130 mg/dL (calc) Final    Glucose 10/26/2021 141* 65 - 99 mg/dL Final    BUN 10/26/2021 20  7 - 25 mg/dL Final    Creatinine 10/26/2021 1.10* 0.50 - 0.99 mg/dL Final    eGFR if non African American 10/26/2021 52* > OR = 60 mL/min/1.73m2 Final    eGFR if  10/26/2021 60  > OR = 60 mL/min/1.73m2 Final    BUN/Creatinine Ratio 10/26/2021 18  6 - 22 (calc) Final    Sodium 10/26/2021 140  135 - 146 mmol/L Final    Potassium 10/26/2021 4.2  3.5 - 5.3 mmol/L Final    Chloride 10/26/2021 106  98 - 110 mmol/L Final    CO2 10/26/2021 28  20 - 32 mmol/L Final    Calcium 10/26/2021 9.3  8.6 - 10.4 mg/dL Final    Total Protein 10/26/2021 6.8  6.1 - 8.1 g/dL Final    Albumin 10/26/2021 4.0  3.6 - 5.1 g/dL Final    Globulin, Total 10/26/2021 2.8  1.9 - 3.7 g/dL (calc) Final    Albumin/Globulin Ratio 10/26/2021 1.4  1.0 - 2.5 (calc) Final    Total Bilirubin 10/26/2021 0.3  0.2 - 1.2 mg/dL Final    Alkaline Phosphatase 10/26/2021 106  37 - 153 U/L Final    AST 10/26/2021 11  10 - 35 U/L Final    ALT 10/26/2021 9  6 - 29 U/L  Final       Past Medical History:   Diagnosis Date    DDD (degenerative disc disease), lumbar     DM type 2 with diabetic dyslipidemia     Dyslipidemia     GERD (gastroesophageal reflux disease)     Hypertension      No past surgical history on file.  No family history on file.    The 10-year CVD risk score (Violetaino, et al., 2008) is: 17.7%    Values used to calculate the score:      Age: 66 years      Sex: Female      Diabetic: Yes      Tobacco smoker: No      Systolic Blood Pressure: 128 mmHg      Is BP treated: Yes      HDL Cholesterol: 35 mg/dL      Total Cholesterol: 128 mg/dL     Marital Status:   Alcohol History:  reports no history of alcohol use.  Tobacco History:  reports that she has never smoked. She has never used smokeless tobacco.  Drug History:  reports no history of drug use.    Health Maintenance Topics with due status: Not Due       Topic Last Completion Date    Colorectal Cancer Screening 08/12/2020    DEXA SCAN 12/22/2020    Diabetes Urine Screening 05/19/2021    Foot Exam 08/11/2021    Mammogram 09/23/2021    Lipid Panel 10/26/2021    Hemoglobin A1c 10/26/2021    Low Dose Statin 02/03/2022     Immunization History   Administered Date(s) Administered    COVID-19, MRNA, LN-S, PF (MODERNA FULL 0.5 ML DOSE) 03/30/2021, 04/27/2021    Influenza - Quadrivalent - High Dose - PF (65 years and older) 11/20/2020    Influenza - Trivalent (ADULT) 08/29/2017    Influenza Split 08/29/2017    Pneumococcal Polysaccharide - 23 Valent 04/28/2016       Review of patient's allergies indicates:  No Known Allergies    Current Outpatient Medications:     alcohol swabs (ALCOHOL WIPES) PadM, Apply 1 each topically once daily., Disp: 200 each, Rfl: 1    aspirin 325 MG tablet, Take 325 mg by mouth once daily. , Disp: , Rfl:     blood sugar diagnostic Strp, To check BG 2 times daily, to use with insurance preferred meter, Disp: 180 strip, Rfl: 3    blood-glucose meter kit, To check BG 2 times daily,  to use with insurance preferred meter, Disp: 1 each, Rfl: 0    calcium-vitamin D tablet 600 mg-200 units, Take 1 tablet by mouth once daily. , Disp: , Rfl:     cholecalciferol, vitamin D3, (VITAMIN D3) 2,000 unit Cap, Take 1 capsule by mouth once daily. , Disp: , Rfl:     lancets Misc, To check BG 2 times daily, to use with insurance preferred meter, Disp: 180 each, Rfl: 3    LUMIGAN 0.01 % Drop, , Disp: , Rfl:     tiZANidine (ZANAFLEX) 4 MG tablet, Take 1 tablet (4 mg total) by mouth nightly as needed (back pain/muscle spasm)., Disp: 90 tablet, Rfl: 1    amLODIPine (NORVASC) 5 MG tablet, Take 1 tablet (5 mg total) by mouth once daily., Disp: 90 tablet, Rfl: 1    empagliflozin (JARDIANCE) 25 mg tablet, Take 1 tablet (25 mg total) by mouth once daily., Disp: 90 tablet, Rfl: 1    glipiZIDE (GLUCOTROL) 5 MG tablet, Take 1 tablet (5 mg total) by mouth 2 (two) times daily with meals., Disp: 180 tablet, Rfl: 1    lisinopriL (PRINIVIL,ZESTRIL) 20 MG tablet, Take 2 tablets (40 mg total) by mouth once daily., Disp: 180 tablet, Rfl: 1    meloxicam (MOBIC) 15 MG tablet, Take 1 tablet (15 mg total) by mouth once daily., Disp: 90 tablet, Rfl: 1    metFORMIN (GLUCOPHAGE) 1000 MG tablet, Take 1 tablet (1,000 mg total) by mouth 2 (two) times daily with meals., Disp: 180 tablet, Rfl: 1    metoprolol succinate (TOPROL-XL) 50 MG 24 hr tablet, Take 1 tablet (50 mg total) by mouth once daily., Disp: 90 tablet, Rfl: 1    omeprazole (PRILOSEC) 20 MG capsule, Take 1 capsule (20 mg total) by mouth once daily., Disp: 90 capsule, Rfl: 1    pravastatin (PRAVACHOL) 40 MG tablet, Take 1 tablet (40 mg total) by mouth once daily. For cholesterol., Disp: 90 tablet, Rfl: 1    semaglutide (OZEMPIC) 1 mg/dose (2 mg/1.5 mL) PnIj, Inject 1 mg into the skin once a week., Disp: 3 pen, Rfl: 1    Review of Systems   Constitutional: Negative for chills, fever and unexpected weight change (wt down 7lbs since August).   HENT: Negative for sore  "throat and trouble swallowing.    Eyes: Negative for visual disturbance.   Respiratory: Negative for cough, shortness of breath and wheezing.    Cardiovascular: Negative for chest pain, palpitations and leg swelling.   Gastrointestinal: Negative for abdominal pain, blood in stool, constipation, diarrhea and vomiting.   Endocrine: Negative for polydipsia, polyphagia and polyuria.   Genitourinary: Positive for frequency. Negative for difficulty urinating, dysuria and hematuria.   Musculoskeletal: Positive for back pain (chronic lower back pain with housework, takes tizanidine only occas). Negative for neck pain.   Skin: Negative for wound.   Neurological: Positive for numbness (tingling to bilat calves/feet). Negative for dizziness, weakness and headaches.   Psychiatric/Behavioral: Negative for dysphoric mood. The patient is not nervous/anxious.           Objective:      Vitals:    02/03/22 0816   BP: 128/74   Pulse: 70   Weight: 73.9 kg (163 lb)   Height: 5' 4" (1.626 m)     Physical Exam  Vitals and nursing note reviewed.   Constitutional:       General: She is not in acute distress.     Appearance: Normal appearance. She is well-developed.   HENT:      Head: Normocephalic and atraumatic.      Right Ear: Tympanic membrane and ear canal normal.      Left Ear: Tympanic membrane and ear canal normal.   Neck:      Vascular: No carotid bruit.   Cardiovascular:      Rate and Rhythm: Normal rate and regular rhythm.      Heart sounds: Murmur (2nd ICS LSB) heard.    Systolic murmur is present with a grade of 2/6.  No friction rub. No gallop.    Pulmonary:      Effort: Pulmonary effort is normal. No respiratory distress.      Breath sounds: Normal breath sounds. No wheezing or rales.   Abdominal:      Palpations: Abdomen is soft.      Tenderness: There is no abdominal tenderness.   Musculoskeletal:      Cervical back: Neck supple.      Right lower leg: No edema.      Left lower leg: No edema.   Lymphadenopathy:      Cervical: " No cervical adenopathy.   Skin:     General: Skin is warm and dry.      Findings: No rash.   Neurological:      General: No focal deficit present.      Mental Status: She is alert and oriented to person, place, and time.      Gait: Gait normal.   Psychiatric:         Mood and Affect: Mood normal.           Assessment:       1. DM type 2 with diabetic dyslipidemia    2. Essential hypertension    3. Dyslipidemia    4. DDD (degenerative disc disease), lumbar    5. Gastroesophageal reflux disease without esophagitis           Plan:       DM type 2 with diabetic dyslipidemia  Comments:  A1C improved to 7.5%, encouraged to continue to work on diet/exercise for goal A1C <7%  Orders:  -     Hemoglobin A1C, POCT  -     metFORMIN (GLUCOPHAGE) 1000 MG tablet; Take 1 tablet (1,000 mg total) by mouth 2 (two) times daily with meals.  Dispense: 180 tablet; Refill: 1  -     empagliflozin (JARDIANCE) 25 mg tablet; Take 1 tablet (25 mg total) by mouth once daily.  Dispense: 90 tablet; Refill: 1  -     glipiZIDE (GLUCOTROL) 5 MG tablet; Take 1 tablet (5 mg total) by mouth 2 (two) times daily with meals.  Dispense: 180 tablet; Refill: 1  -     semaglutide (OZEMPIC) 1 mg/dose (2 mg/1.5 mL) PnIj; Inject 1 mg into the skin once a week.  Dispense: 3 pen; Refill: 1  -     CBC Auto Differential; Future; Expected date: 02/03/2022  -     Comprehensive Metabolic Panel; Future; Expected date: 02/03/2022  -     Lipid Panel; Future; Expected date: 02/03/2022  -     Microalbumin/Creatinine Ratio, Urine; Future; Expected date: 02/03/2022  -     Urinalysis, Reflex to Urine Culture Urine, Clean Catch; Future; Expected date: 02/03/2022  -     TSH w/reflex to FT4; Future; Expected date: 02/03/2022  -     Hemoglobin A1C; Future; Expected date: 02/03/2022    Essential hypertension  Comments:  BP well controlled  Orders:  -     metoprolol succinate (TOPROL-XL) 50 MG 24 hr tablet; Take 1 tablet (50 mg total) by mouth once daily.  Dispense: 90 tablet;  Refill: 1  -     lisinopriL (PRINIVIL,ZESTRIL) 20 MG tablet; Take 2 tablets (40 mg total) by mouth once daily.  Dispense: 180 tablet; Refill: 1  -     amLODIPine (NORVASC) 5 MG tablet; Take 1 tablet (5 mg total) by mouth once daily.  Dispense: 90 tablet; Refill: 1    Dyslipidemia  Comments:  Well controlled on last labs- repeat before next visit  Orders:  -     pravastatin (PRAVACHOL) 40 MG tablet; Take 1 tablet (40 mg total) by mouth once daily. For cholesterol.  Dispense: 90 tablet; Refill: 1    DDD (degenerative disc disease), lumbar  Comments:  stable  Orders:  -     meloxicam (MOBIC) 15 MG tablet; Take 1 tablet (15 mg total) by mouth once daily.  Dispense: 90 tablet; Refill: 1    Gastroesophageal reflux disease without esophagitis  Comments:  controlled on med  Orders:  -     omeprazole (PRILOSEC) 20 MG capsule; Take 1 capsule (20 mg total) by mouth once daily.  Dispense: 90 capsule; Refill: 1      Follow up in about 3 months (around 5/3/2022) for Diabetes, HTN, Labs before next visit.          Counseled on age and gender appropriate medical preventative services, including cancer screenings, immunizations, overall nutritional health, need for a consistent exercise regimen and an overall push towards maintaining a vigorous and active lifestyle.      2/3/2022 Priyanka Garibay NP

## 2022-03-07 DIAGNOSIS — E78.5 DM TYPE 2 WITH DIABETIC DYSLIPIDEMIA: ICD-10-CM

## 2022-03-07 DIAGNOSIS — E78.5 DYSLIPIDEMIA: ICD-10-CM

## 2022-03-07 DIAGNOSIS — E11.69 DM TYPE 2 WITH DIABETIC DYSLIPIDEMIA: ICD-10-CM

## 2022-03-07 RX ORDER — PRAVASTATIN SODIUM 40 MG/1
40 TABLET ORAL DAILY
Qty: 90 TABLET | Refills: 1 | Status: SHIPPED | OUTPATIENT
Start: 2022-03-07 | End: 2022-10-26 | Stop reason: SDUPTHER

## 2022-03-07 RX ORDER — SEMAGLUTIDE 1.34 MG/ML
1 INJECTION, SOLUTION SUBCUTANEOUS WEEKLY
Qty: 3 PEN | Refills: 1 | Status: SHIPPED | OUTPATIENT
Start: 2022-03-07 | End: 2022-10-26 | Stop reason: SDUPTHER

## 2022-03-07 NOTE — TELEPHONE ENCOUNTER
----- Message from Kelly Goldman sent at 3/7/2022  8:46 AM CST -----  Pt calling for refills on Pravastatin and Ozempic to [facility]  [phone]

## 2022-03-31 LAB
LEFT EYE DM RETINOPATHY: POSITIVE
RIGHT EYE DM RETINOPATHY: POSITIVE

## 2022-04-04 ENCOUNTER — HOSPITAL ENCOUNTER (EMERGENCY)
Facility: HOSPITAL | Age: 67
Discharge: HOME | End: 2022-04-04
Attending: EMERGENCY MEDICINE
Payer: COMMERCIAL

## 2022-04-04 VITALS
DIASTOLIC BLOOD PRESSURE: 73 MMHG | HEIGHT: 64 IN | HEART RATE: 76 BPM | TEMPERATURE: 99 F | BODY MASS INDEX: 27.28 KG/M2 | RESPIRATION RATE: 17 BRPM | OXYGEN SATURATION: 98 % | WEIGHT: 159.81 LBS | SYSTOLIC BLOOD PRESSURE: 130 MMHG

## 2022-04-04 DIAGNOSIS — J02.0 STREP PHARYNGITIS: Primary | ICD-10-CM

## 2022-04-04 LAB
GROUP A STREP, MOLECULAR: POSITIVE
INFLUENZA A, MOLECULAR: NEGATIVE
INFLUENZA B, MOLECULAR: NEGATIVE
SARS-COV-2 RDRP RESP QL NAA+PROBE: NEGATIVE
SPECIMEN SOURCE: NORMAL

## 2022-04-04 PROCEDURE — 87389 HIV-1 AG W/HIV-1&-2 AB AG IA: CPT | Performed by: EMERGENCY MEDICINE

## 2022-04-04 PROCEDURE — 36415 COLL VENOUS BLD VENIPUNCTURE: CPT | Performed by: EMERGENCY MEDICINE

## 2022-04-04 PROCEDURE — 96372 THER/PROPH/DIAG INJ SC/IM: CPT | Performed by: PHYSICIAN ASSISTANT

## 2022-04-04 PROCEDURE — 87651 STREP A DNA AMP PROBE: CPT | Performed by: PHYSICIAN ASSISTANT

## 2022-04-04 PROCEDURE — 87502 INFLUENZA DNA AMP PROBE: CPT | Performed by: PHYSICIAN ASSISTANT

## 2022-04-04 PROCEDURE — 63600175 PHARM REV CODE 636 W HCPCS: Performed by: PHYSICIAN ASSISTANT

## 2022-04-04 PROCEDURE — 86803 HEPATITIS C AB TEST: CPT | Performed by: EMERGENCY MEDICINE

## 2022-04-04 PROCEDURE — U0002 COVID-19 LAB TEST NON-CDC: HCPCS | Performed by: PHYSICIAN ASSISTANT

## 2022-04-04 PROCEDURE — 99284 EMERGENCY DEPT VISIT MOD MDM: CPT

## 2022-04-04 RX ADMIN — PENICILLIN G BENZATHINE 1.2 MILLION UNITS: 1200000 INJECTION, SUSPENSION INTRAMUSCULAR at 12:04

## 2022-04-04 NOTE — ED NOTES
Pt AAOx4, Abc's intact. NADN. No adverse reaction to medication given. D/C instructions and Rx in pt possession along with belongings. No other needs at this time. Pt ambulatory to ED Lobby without assistance, steady gait. Discussed importance of discarding personal toothbrush away to avoid reinfection. Pt verbalized understanding.

## 2022-04-04 NOTE — ED NOTES
Assumed care from: Maria A STEINER RN Ellisadri Mojica is awake, alert and oriented x 3, skin warm and dry, in NAD with family at bedside. Side rails up, call bell within reach. Pt provided with ice water. Swabs sent to Lab. No further needs at this time.

## 2022-04-04 NOTE — ED PROVIDER NOTES
Encounter Date: 4/4/2022       History     Chief Complaint   Patient presents with    Sore Throat     Since Friday     Fever     Patient is a 67 year old female who presents with sore throat for four days. She has PMH significant for GERD, hypertension, DM-2 and DDD. She reports associated chills and rhinorrhea. She denied cough, nausea, vomiting or diarrhea. No sick contact. Gargling with salt water at home.     The history is provided by the patient.     Review of patient's allergies indicates:  No Known Allergies  Past Medical History:   Diagnosis Date    DDD (degenerative disc disease), lumbar     DM type 2 with diabetic dyslipidemia     Dyslipidemia     GERD (gastroesophageal reflux disease)     Hypertension      No past surgical history on file.  No family history on file.  Social History     Tobacco Use    Smoking status: Never Smoker    Smokeless tobacco: Never Used   Substance Use Topics    Alcohol use: Never    Drug use: Never     Review of Systems   Constitutional: Negative for activity change, appetite change, fatigue and fever.   HENT: Positive for rhinorrhea and sore throat. Negative for congestion.    Eyes: Negative for visual disturbance.   Respiratory: Negative for cough, chest tightness, shortness of breath and wheezing.    Cardiovascular: Negative for chest pain and palpitations.   Gastrointestinal: Negative for diarrhea, nausea and vomiting.   Musculoskeletal: Negative for arthralgias and myalgias.   Skin: Negative for rash.   Neurological: Negative for weakness, light-headedness, numbness and headaches.       Physical Exam     Initial Vitals [04/04/22 0952]   BP Pulse Resp Temp SpO2   134/64 84 18 98.2 °F (36.8 °C) 98 %      MAP       --         Physical Exam    Nursing note and vitals reviewed.  Constitutional: She appears well-developed and well-nourished. She is cooperative.  Non-toxic appearance. She does not have a sickly appearance.   HENT:   Head: Normocephalic and atraumatic.    Right Ear: Tympanic membrane and external ear normal.   Left Ear: External ear normal.   Nose: Nose normal.   Mouth/Throat: Uvula is midline. Posterior oropharyngeal erythema present.   Bilateral tonsillar swelling with posterior oropharyngeal erythema. Uvula midline. Normal phonation. Left canal occluded with cerumen.    Eyes: Conjunctivae and lids are normal.   Neck: Neck supple.   Normal range of motion.   Full passive range of motion without pain.     Cardiovascular: Normal rate, regular rhythm and normal heart sounds. Exam reveals no gallop and no friction rub.    No murmur heard.  Pulmonary/Chest: Breath sounds normal. She has no wheezes. She has no rhonchi. She has no rales.   Musculoskeletal:      Cervical back: Full passive range of motion without pain, normal range of motion and neck supple.     Neurological: She is alert.   Skin: Skin is warm, dry and intact. No rash noted.         ED Course   Procedures  Labs Reviewed   GROUP A STREP, MOLECULAR - Abnormal; Notable for the following components:       Result Value    Group A Strep, Molecular Positive (*)     All other components within normal limits   INFLUENZA A & B BY MOLECULAR   SARS-COV-2 RNA AMPLIFICATION, QUAL   HEPATITIS C ANTIBODY   HIV 1 / 2 ANTIBODY          Imaging Results    None          Medications   penicillin G benzathine (BICILLIN LA) injection 1.2 Million Units (1.2 Million Units Intramuscular Given 4/4/22 1223)     Medical Decision Making:   History:   Old Medical Records: I decided to obtain old medical records.  Clinical Tests:   Lab Tests: Ordered and Reviewed       APC / Resident Notes:   Urgent evaluation of a well appearing 67 year old female who presents with sore throat. She reports mild rhinorrhea. Patient denied fever.  No abdominal pain, nausea or vomiting.  Vital signs are stable.  Patient is afebrile.  Abdomen is soft and nontender.  There is no rebound, rigidity or distention.  I doubt intra-abdominal process.  Bilateral  tonsillar swelling with erythema. No sign of PTA. Uvula is midline.  Normal phonation. No trismus.  Workup is positive for strep-pharyngitis.  Suspect symptoms are secondary to viral illness.  Symptomatically treatment. Discussed results with patient. Return precautions given. Patient is to follow up with their primary care provider. Case was discussed with Dr. Catherine who is in agreement with the plan of care. All questions answered.                    Clinical Impression:   Final diagnoses:  [J02.0] Strep pharyngitis (Primary)          ED Disposition Condition    Discharge Stable        ED Prescriptions     None        Follow-up Information     Follow up With Specialties Details Why Contact Info    Priyanka Garibay NP Family Medicine   [address] [facility]  [address] [place] [id number]  [phone]      [facility] Emergency Dept Emergency Medicine  As needed [address] [place] [address] Drive  [facility] [id number] [phone]           Loretta Nava PA-C  04/04/22 7865

## 2022-04-04 NOTE — ED NOTES
Assumed care:  Rhonda Mojica is awake, alert and oriented x 3, skin warm and dry, in NAD with family at bedside.  Patient CO sore throat and dizziness x 4 days.      Patient identifiers for Rhonda Mojica checked and correct.  LOC:  Rhonda Mojica is awake, alert, and aware of environment with an appropriate affect. She is oriented x 3 and speaking appropriately.  APPEARANCE:  She is resting comfortably and in no acute distress. She is clean and well groomed, patient's clothing is properly fastened.  SKIN:  The skin is warm and dry. She has normal skin turgor and moist mucus membranes. Skin is intact; no bruising or breakdown noted.  MUSCULOSKELETAL:  She is moving all extremities well, no obvious deformities noted. Pulses intact.   RESPIRATORY:  Airway is open and patent. Respirations are spontaneous and non-labored with normal effort and rate.  Sore throat  CARDIAC:  She has a normal rate and rhythm. No peripheral edema noted. Capillary refill < 3 seconds.  ABDOMEN:  No distention noted.  Soft and non-tender upon palpation.  NEUROLOGICAL:  PERRL. Facial expression is symmetrical. Hand grasps are equal bilaterally. Normal sensation in all extremities when touched with finger.  dizzy  Allergies reported:  Review of patient's allergies indicates:  No Known Allergies  OTHER NOTES:

## 2022-04-04 NOTE — DISCHARGE INSTRUCTIONS
You were given a shot of antibiotics today which should treat the infection. Take tylenol as needed. You can also use over the counter throat spray. Follow up with primary care provider.  For worsening symptoms, chest pain, shortness of breath, increased abdominal pain, high grade fever, stroke or stroke like symptoms, immediately go to the nearest Emergency Room or call 911 as soon as possible.

## 2022-04-05 LAB
HCV AB SERPL QL IA: NEGATIVE
HIV 1+2 AB+HIV1 P24 AG SERPL QL IA: NEGATIVE

## 2022-04-25 ENCOUNTER — TELEPHONE (OUTPATIENT)
Dept: FAMILY MEDICINE | Facility: CLINIC | Age: 67
End: 2022-04-25

## 2022-04-30 LAB
ALBUMIN SERPL-MCNC: 4 G/DL (ref 3.6–5.1)
ALBUMIN/CREAT UR: 9 MCG/MG CREAT
ALBUMIN/GLOB SERPL: 1.3 (CALC) (ref 1–2.5)
ALP SERPL-CCNC: 101 U/L (ref 37–153)
ALT SERPL-CCNC: 12 U/L (ref 6–29)
APPEARANCE UR: CLEAR
AST SERPL-CCNC: 12 U/L (ref 10–35)
BACTERIA #/AREA URNS HPF: ABNORMAL /HPF
BACTERIA UR CULT: ABNORMAL
BACTERIA UR CULT: ABNORMAL
BASOPHILS # BLD AUTO: 86 CELLS/UL (ref 0–200)
BASOPHILS NFR BLD AUTO: 0.8 %
BILIRUB SERPL-MCNC: 0.4 MG/DL (ref 0.2–1.2)
BILIRUB UR QL STRIP: NEGATIVE
BUN SERPL-MCNC: 25 MG/DL (ref 7–25)
BUN/CREAT SERPL: 20 (CALC) (ref 6–22)
CALCIUM SERPL-MCNC: 9.6 MG/DL (ref 8.6–10.4)
CHLORIDE SERPL-SCNC: 106 MMOL/L (ref 98–110)
CHOLEST SERPL-MCNC: 132 MG/DL
CHOLEST/HDLC SERPL: 3.2 (CALC)
CO2 SERPL-SCNC: 25 MMOL/L (ref 20–32)
COLOR UR: YELLOW
CREAT SERPL-MCNC: 1.24 MG/DL (ref 0.5–0.99)
CREAT UR-MCNC: 78 MG/DL (ref 20–275)
EOSINOPHIL # BLD AUTO: 642 CELLS/UL (ref 15–500)
EOSINOPHIL NFR BLD AUTO: 6 %
ERYTHROCYTE [DISTWIDTH] IN BLOOD BY AUTOMATED COUNT: 16.8 % (ref 11–15)
GLOBULIN SER CALC-MCNC: 3.2 G/DL (CALC) (ref 1.9–3.7)
GLUCOSE SERPL-MCNC: 118 MG/DL (ref 65–99)
GLUCOSE UR QL STRIP: ABNORMAL
HBA1C MFR BLD: 7.2 % OF TOTAL HGB
HCT VFR BLD AUTO: 34.2 % (ref 35–45)
HDLC SERPL-MCNC: 41 MG/DL
HGB BLD-MCNC: 10.5 G/DL (ref 11.7–15.5)
HGB UR QL STRIP: NEGATIVE
HYALINE CASTS #/AREA URNS LPF: ABNORMAL /LPF
KETONES UR QL STRIP: NEGATIVE
LDLC SERPL CALC-MCNC: 72 MG/DL (CALC)
LEUKOCYTE ESTERASE UR QL STRIP: ABNORMAL
LYMPHOCYTES # BLD AUTO: 2589 CELLS/UL (ref 850–3900)
LYMPHOCYTES NFR BLD AUTO: 24.2 %
MCH RBC QN AUTO: 21.3 PG (ref 27–33)
MCHC RBC AUTO-ENTMCNC: 30.7 G/DL (ref 32–36)
MCV RBC AUTO: 69.4 FL (ref 80–100)
MICROALBUMIN UR-MCNC: 0.7 MG/DL
MONOCYTES # BLD AUTO: 1006 CELLS/UL (ref 200–950)
MONOCYTES NFR BLD AUTO: 9.4 %
MORPHOLOGY BLD-IMP: ABNORMAL
NEUTROPHILS # BLD AUTO: 6377 CELLS/UL (ref 1500–7800)
NEUTROPHILS NFR BLD AUTO: 59.6 %
NITRITE UR QL STRIP: NEGATIVE
NONHDLC SERPL-MCNC: 91 MG/DL (CALC)
PH UR STRIP: 5.5 [PH] (ref 5–8)
PLATELET # BLD AUTO: 344 THOUSAND/UL (ref 140–400)
PMV BLD REES-ECKER: 11.3 FL (ref 7.5–12.5)
POTASSIUM SERPL-SCNC: 4.5 MMOL/L (ref 3.5–5.3)
PROT SERPL-MCNC: 7.2 G/DL (ref 6.1–8.1)
PROT UR QL STRIP: NEGATIVE
RBC # BLD AUTO: 4.93 MILLION/UL (ref 3.8–5.1)
RBC #/AREA URNS HPF: ABNORMAL /HPF
SODIUM SERPL-SCNC: 141 MMOL/L (ref 135–146)
SP GR UR STRIP: 1.02 (ref 1–1.03)
SQUAMOUS #/AREA URNS HPF: ABNORMAL /HPF
TRIGL SERPL-MCNC: 107 MG/DL
TSH SERPL-ACNC: 1.53 MIU/L (ref 0.4–4.5)
WBC # BLD AUTO: 10.7 THOUSAND/UL (ref 3.8–10.8)
WBC #/AREA URNS HPF: ABNORMAL /HPF

## 2022-05-02 ENCOUNTER — TELEPHONE (OUTPATIENT)
Dept: FAMILY MEDICINE | Facility: CLINIC | Age: 67
End: 2022-05-02

## 2022-05-02 DIAGNOSIS — I10 ESSENTIAL HYPERTENSION: ICD-10-CM

## 2022-05-02 RX ORDER — CIPROFLOXACIN 250 MG/1
250 TABLET, FILM COATED ORAL 2 TIMES DAILY
Qty: 10 TABLET | Refills: 0 | Status: SHIPPED | OUTPATIENT
Start: 2022-05-02 | End: 2022-08-17

## 2022-05-02 RX ORDER — AMLODIPINE BESYLATE 5 MG/1
5 TABLET ORAL DAILY
Qty: 90 TABLET | Refills: 1 | Status: SHIPPED | OUTPATIENT
Start: 2022-05-02 | End: 2022-05-03 | Stop reason: SDUPTHER

## 2022-05-03 ENCOUNTER — OFFICE VISIT (OUTPATIENT)
Dept: FAMILY MEDICINE | Facility: CLINIC | Age: 67
End: 2022-05-03
Payer: COMMERCIAL

## 2022-05-03 VITALS
HEIGHT: 64 IN | HEART RATE: 78 BPM | BODY MASS INDEX: 26.43 KG/M2 | SYSTOLIC BLOOD PRESSURE: 130 MMHG | WEIGHT: 154.81 LBS | DIASTOLIC BLOOD PRESSURE: 64 MMHG

## 2022-05-03 DIAGNOSIS — K52.9 ACUTE GASTROENTERITIS: ICD-10-CM

## 2022-05-03 DIAGNOSIS — N18.31 STAGE 3A CHRONIC KIDNEY DISEASE: ICD-10-CM

## 2022-05-03 DIAGNOSIS — I10 ESSENTIAL HYPERTENSION: ICD-10-CM

## 2022-05-03 DIAGNOSIS — E78.5 DYSLIPIDEMIA: ICD-10-CM

## 2022-05-03 DIAGNOSIS — E11.69 DM TYPE 2 WITH DIABETIC DYSLIPIDEMIA: Primary | ICD-10-CM

## 2022-05-03 DIAGNOSIS — B37.31 VAGINAL CANDIDIASIS: ICD-10-CM

## 2022-05-03 DIAGNOSIS — E78.5 DM TYPE 2 WITH DIABETIC DYSLIPIDEMIA: Primary | ICD-10-CM

## 2022-05-03 PROCEDURE — 1159F MED LIST DOCD IN RCRD: CPT | Mod: CPTII,S$GLB,, | Performed by: NURSE PRACTITIONER

## 2022-05-03 PROCEDURE — 3008F PR BODY MASS INDEX (BMI) DOCUMENTED: ICD-10-PCS | Mod: CPTII,S$GLB,, | Performed by: NURSE PRACTITIONER

## 2022-05-03 PROCEDURE — 3061F NEG MICROALBUMINURIA REV: CPT | Mod: CPTII,S$GLB,, | Performed by: NURSE PRACTITIONER

## 2022-05-03 PROCEDURE — 3075F SYST BP GE 130 - 139MM HG: CPT | Mod: CPTII,S$GLB,, | Performed by: NURSE PRACTITIONER

## 2022-05-03 PROCEDURE — 3066F PR DOCUMENTATION OF TREATMENT FOR NEPHROPATHY: ICD-10-PCS | Mod: CPTII,S$GLB,, | Performed by: NURSE PRACTITIONER

## 2022-05-03 PROCEDURE — 4010F PR ACE/ARB THEARPY RXD/TAKEN: ICD-10-PCS | Mod: CPTII,S$GLB,, | Performed by: NURSE PRACTITIONER

## 2022-05-03 PROCEDURE — 4010F ACE/ARB THERAPY RXD/TAKEN: CPT | Mod: CPTII,S$GLB,, | Performed by: NURSE PRACTITIONER

## 2022-05-03 PROCEDURE — 3075F PR MOST RECENT SYSTOLIC BLOOD PRESS GE 130-139MM HG: ICD-10-PCS | Mod: CPTII,S$GLB,, | Performed by: NURSE PRACTITIONER

## 2022-05-03 PROCEDURE — 3008F BODY MASS INDEX DOCD: CPT | Mod: CPTII,S$GLB,, | Performed by: NURSE PRACTITIONER

## 2022-05-03 PROCEDURE — 3051F PR MOST RECENT HEMOGLOBIN A1C LEVEL 7.0 - < 8.0%: ICD-10-PCS | Mod: CPTII,S$GLB,, | Performed by: NURSE PRACTITIONER

## 2022-05-03 PROCEDURE — 1160F PR REVIEW ALL MEDS BY PRESCRIBER/CLIN PHARMACIST DOCUMENTED: ICD-10-PCS | Mod: CPTII,S$GLB,, | Performed by: NURSE PRACTITIONER

## 2022-05-03 PROCEDURE — 1160F RVW MEDS BY RX/DR IN RCRD: CPT | Mod: CPTII,S$GLB,, | Performed by: NURSE PRACTITIONER

## 2022-05-03 PROCEDURE — 3051F HG A1C>EQUAL 7.0%<8.0%: CPT | Mod: CPTII,S$GLB,, | Performed by: NURSE PRACTITIONER

## 2022-05-03 PROCEDURE — 3078F PR MOST RECENT DIASTOLIC BLOOD PRESSURE < 80 MM HG: ICD-10-PCS | Mod: CPTII,S$GLB,, | Performed by: NURSE PRACTITIONER

## 2022-05-03 PROCEDURE — 1159F PR MEDICATION LIST DOCUMENTED IN MEDICAL RECORD: ICD-10-PCS | Mod: CPTII,S$GLB,, | Performed by: NURSE PRACTITIONER

## 2022-05-03 PROCEDURE — 3078F DIAST BP <80 MM HG: CPT | Mod: CPTII,S$GLB,, | Performed by: NURSE PRACTITIONER

## 2022-05-03 PROCEDURE — 99214 OFFICE O/P EST MOD 30 MIN: CPT | Mod: S$GLB,,, | Performed by: NURSE PRACTITIONER

## 2022-05-03 PROCEDURE — 3061F PR NEG MICROALBUMINURIA RESULT DOCUMENTED/REVIEW: ICD-10-PCS | Mod: CPTII,S$GLB,, | Performed by: NURSE PRACTITIONER

## 2022-05-03 PROCEDURE — 99214 PR OFFICE/OUTPT VISIT, EST, LEVL IV, 30-39 MIN: ICD-10-PCS | Mod: S$GLB,,, | Performed by: NURSE PRACTITIONER

## 2022-05-03 PROCEDURE — 3066F NEPHROPATHY DOC TX: CPT | Mod: CPTII,S$GLB,, | Performed by: NURSE PRACTITIONER

## 2022-05-03 RX ORDER — AMLODIPINE BESYLATE 5 MG/1
5 TABLET ORAL DAILY
Qty: 90 TABLET | Refills: 1 | Status: SHIPPED | OUTPATIENT
Start: 2022-05-03 | End: 2022-12-08 | Stop reason: SDUPTHER

## 2022-05-03 RX ORDER — ONDANSETRON 4 MG/1
4 TABLET, ORALLY DISINTEGRATING ORAL EVERY 6 HOURS PRN
Qty: 12 TABLET | Refills: 0 | Status: SHIPPED | OUTPATIENT
Start: 2022-05-03

## 2022-05-03 RX ORDER — FLUCONAZOLE 150 MG/1
TABLET ORAL
Qty: 2 TABLET | Refills: 0 | Status: SHIPPED | OUTPATIENT
Start: 2022-05-03 | End: 2022-08-17

## 2022-05-03 NOTE — PROGRESS NOTES
SUBJECTIVE:    Patient ID: Rhonda Mojica is a 67 y.o. female.    Chief Complaint: Diabetes (No Bottles / TA )    Pt here for DM f/u  Reports last week had sudden onset of nausea, vomited once and diarrhea. Denies fever but felt some chills. Reports starting to feel better slowly over past couple days. Has been able to drink fluids and slowly getting her appetite back. Stools have firmed up, no black/bloody stools. Took 1-2 doses of imodium which helped. Patient has been on Ozempic for quite some time now and has tolerated well without nausea vomiting until last weeks episode.  Reports the past month has had intermittent vaginal itching and irritation. Denies any dysuria or hematuria. She is on Jardiance for DM      Admission on 04/04/2022, Discharged on 04/04/2022   Component Date Value Ref Range Status    Hepatitis C Ab 04/04/2022 Negative  Negative Final    HIV 1/2 Ag/Ab 04/04/2022 Negative  Negative Final    Group A Strep, Molecular 04/04/2022 Positive (A) Negative Final    Influenza A, Molecular 04/04/2022 Negative  Negative Final    Influenza B, Molecular 04/04/2022 Negative  Negative Final    Flu A & B Source 04/04/2022 Nasal swab   Final    SARS-CoV-2 RNA, Amplification, Qual 04/04/2022 Negative  Negative Final   Office Visit on 02/03/2022   Component Date Value Ref Range Status    Hemoglobin A1C 02/03/2022 7.5  % Final    WBC 04/27/2022 10.7  3.8 - 10.8 Thousand/uL Final    RBC 04/27/2022 4.93  3.80 - 5.10 Million/uL Final    Hemoglobin 04/27/2022 10.5 (A) 11.7 - 15.5 g/dL Final    Hematocrit 04/27/2022 34.2 (A) 35.0 - 45.0 % Final    MCV 04/27/2022 69.4 (A) 80.0 - 100.0 fL Final    MCH 04/27/2022 21.3 (A) 27.0 - 33.0 pg Final    MCHC 04/27/2022 30.7 (A) 32.0 - 36.0 g/dL Final    RDW 04/27/2022 16.8 (A) 11.0 - 15.0 % Final    Platelets 04/27/2022 344  140 - 400 Thousand/uL Final    MPV 04/27/2022 11.3  7.5 - 12.5 fL Final    Neutrophils, Abs 04/27/2022 6,377  1,500 - 7,800 cells/uL Final     Lymph # 04/27/2022 2,589  850 - 3,900 cells/uL Final    Mono # 04/27/2022 1,006 (A) 200 - 950 cells/uL Final    Eos # 04/27/2022 642 (A) 15 - 500 cells/uL Final    Baso # 04/27/2022 86  0 - 200 cells/uL Final    Neutrophils Relative 04/27/2022 59.6  % Final    Lymph % 04/27/2022 24.2  % Final    Mono % 04/27/2022 9.4  % Final    Eosinophil % 04/27/2022 6.0  % Final    Basophil % 04/27/2022 0.8  % Final    CBC Morphology 04/27/2022   NORMAL Final    Glucose 04/27/2022 118 (A) 65 - 99 mg/dL Final    BUN 04/27/2022 25  7 - 25 mg/dL Final    Creatinine 04/27/2022 1.24 (A) 0.50 - 0.99 mg/dL Final    eGFR if non African American 04/27/2022 45 (A) > OR = 60 mL/min/1.73m2 Final    eGFR if  04/27/2022 52 (A) > OR = 60 mL/min/1.73m2 Final    BUN/Creatinine Ratio 04/27/2022 20  6 - 22 (calc) Final    Sodium 04/27/2022 141  135 - 146 mmol/L Final    Potassium 04/27/2022 4.5  3.5 - 5.3 mmol/L Final    Chloride 04/27/2022 106  98 - 110 mmol/L Final    CO2 04/27/2022 25  20 - 32 mmol/L Final    Calcium 04/27/2022 9.6  8.6 - 10.4 mg/dL Final    Total Protein 04/27/2022 7.2  6.1 - 8.1 g/dL Final    Albumin 04/27/2022 4.0  3.6 - 5.1 g/dL Final    Globulin, Total 04/27/2022 3.2  1.9 - 3.7 g/dL (calc) Final    Albumin/Globulin Ratio 04/27/2022 1.3  1.0 - 2.5 (calc) Final    Total Bilirubin 04/27/2022 0.4  0.2 - 1.2 mg/dL Final    Alkaline Phosphatase 04/27/2022 101  37 - 153 U/L Final    AST 04/27/2022 12  10 - 35 U/L Final    ALT 04/27/2022 12  6 - 29 U/L Final    Cholesterol 04/27/2022 132  <200 mg/dL Final    HDL 04/27/2022 41 (A) > OR = 50 mg/dL Final    Triglycerides 04/27/2022 107  <150 mg/dL Final    LDL Cholesterol 04/27/2022 72  mg/dL (calc) Final    HDL/Cholesterol Ratio 04/27/2022 3.2  <5.0 (calc) Final    Non HDL Chol. (LDL+VLDL) 04/27/2022 91  <130 mg/dL (calc) Final    Creatinine, Urine 04/27/2022 78  20 - 275 mg/dL Final    Microalb, Ur 04/27/2022 0.7  See Note:  mg/dL Final    Microalb/Creat Ratio 04/27/2022 9  <30 mcg/mg creat Final    Color, UA 04/27/2022 YELLOW  YELLOW Final    Appearance, UA 04/27/2022 CLEAR  CLEAR Final    Specific [place], UA 04/27/2022 1.024  1.001 - 1.035 Final    pH, UA 04/27/2022 5.5  5.0 - 8.0 Final    Glucose, UA 04/27/2022 3+ (A) NEGATIVE Final    Bilirubin, UA 04/27/2022 NEGATIVE  NEGATIVE Final    Ketones, UA 04/27/2022 NEGATIVE  NEGATIVE Final    Occult Blood UA 04/27/2022 NEGATIVE  NEGATIVE Final    Protein, UA 04/27/2022 NEGATIVE  NEGATIVE Final    Nitrite, UA 04/27/2022 NEGATIVE  NEGATIVE Final    Leukocytes, UA 04/27/2022 2+ (A) NEGATIVE Final    WBC Casts, UA 04/27/2022 20-40 (A) < OR = 5 /HPF Final    RBC Casts, UA 04/27/2022 NONE SEEN  < OR = 2 /HPF Final    Squam Epithel, UA 04/27/2022 0-5  < OR = 5 /HPF Final    Bacteria, UA 04/27/2022 FEW (A) NONE SEEN /HPF Final    Hyaline Casts, UA 04/27/2022 6-10 (A) NONE SEEN /LPF Final    Reflexive Urine Culture 04/27/2022    Final    Urine Culture, Routine 04/27/2022  (A)  Final    TSH w/reflex to FT4 04/27/2022 1.53  0.40 - 4.50 mIU/L Final    Hemoglobin A1C 04/27/2022 7.2 (A) <5.7 % of total Hgb Final       Past Medical History:   Diagnosis Date    DDD (degenerative disc disease), lumbar     DM type 2 with diabetic dyslipidemia     Dyslipidemia     GERD (gastroesophageal reflux disease)     Hypertension      History reviewed. No pertinent surgical history.  History reviewed. No pertinent family history.    The 10-year CVD risk score (D'Agostino, et al., 2008) is: 17.7%    Values used to calculate the score:      Age: 67 years      Sex: Female      Diabetic: Yes      Tobacco smoker: No      Systolic Blood Pressure: 130 mmHg      Is BP treated: Yes      HDL Cholesterol: 41 mg/dL      Total Cholesterol: 132 mg/dL     Marital Status:   Alcohol History:  reports no history of alcohol use.  Tobacco History:  reports that she has never smoked. She has never used  smokeless tobacco.  Drug History:  reports no history of drug use.    Health Maintenance Topics with due status: Not Due       Topic Last Completion Date    Colorectal Cancer Screening 08/12/2020    Influenza Vaccine 11/20/2020    DEXA Scan 12/22/2020    Foot Exam 08/11/2021    Mammogram 09/23/2021    Eye Exam 11/18/2021    Diabetes Urine Screening 04/27/2022    Lipid Panel 04/27/2022    Hemoglobin A1c 04/27/2022    Low Dose Statin 05/03/2022     Immunization History   Administered Date(s) Administered    COVID-19, MRNA, LN-S, PF (MODERNA FULL 0.5 ML DOSE) 03/30/2021, 04/27/2021    Influenza - Quadrivalent - High Dose - PF (65 years and older) 11/20/2020    Influenza - Trivalent (ADULT) 08/29/2017    Influenza Split 08/29/2017    Pneumococcal Polysaccharide - 23 Valent 04/28/2016       Review of patient's allergies indicates:  No Known Allergies    Current Outpatient Medications:     alcohol swabs (ALCOHOL WIPES) PadM, Apply 1 each topically once daily., Disp: 200 each, Rfl: 1    aspirin 325 MG tablet, Take 325 mg by mouth once daily. , Disp: , Rfl:     calcium-vitamin D tablet 600 mg-200 units, Take 1 tablet by mouth once daily. , Disp: , Rfl:     cholecalciferol, vitamin D3, (VITAMIN D3) 2,000 unit Cap, Take 1 capsule by mouth once daily. , Disp: , Rfl:     empagliflozin (JARDIANCE) 25 mg tablet, Take 1 tablet (25 mg total) by mouth once daily., Disp: 90 tablet, Rfl: 1    glipiZIDE (GLUCOTROL) 5 MG tablet, Take 1 tablet (5 mg total) by mouth 2 (two) times daily with meals., Disp: 180 tablet, Rfl: 1    lisinopriL (PRINIVIL,ZESTRIL) 20 MG tablet, Take 2 tablets (40 mg total) by mouth once daily., Disp: 180 tablet, Rfl: 1    meloxicam (MOBIC) 15 MG tablet, Take 1 tablet (15 mg total) by mouth once daily., Disp: 90 tablet, Rfl: 1    metFORMIN (GLUCOPHAGE) 1000 MG tablet, Take 1 tablet (1,000 mg total) by mouth 2 (two) times daily with meals., Disp: 180 tablet, Rfl: 1    metoprolol succinate (TOPROL-XL)  50 MG 24 hr tablet, Take 1 tablet (50 mg total) by mouth once daily., Disp: 90 tablet, Rfl: 1    omeprazole (PRILOSEC) 20 MG capsule, Take 1 capsule (20 mg total) by mouth once daily., Disp: 90 capsule, Rfl: 1    pravastatin (PRAVACHOL) 40 MG tablet, Take 1 tablet (40 mg total) by mouth once daily. For cholesterol., Disp: 90 tablet, Rfl: 1    semaglutide (OZEMPIC) 1 mg/dose (2 mg/1.5 mL) PnIj, Inject 1 mg into the skin once a week., Disp: 3 pen, Rfl: 1    tiZANidine (ZANAFLEX) 4 MG tablet, Take 1 tablet (4 mg total) by mouth nightly as needed (back pain/muscle spasm)., Disp: 90 tablet, Rfl: 1    amLODIPine (NORVASC) 5 MG tablet, Take 1 tablet (5 mg total) by mouth once daily., Disp: 90 tablet, Rfl: 1    blood sugar diagnostic Strp, To check BG 2 times daily, to use with insurance preferred meter, Disp: 180 strip, Rfl: 3    blood-glucose meter kit, To check BG 2 times daily, to use with insurance preferred meter, Disp: 1 each, Rfl: 0    ciprofloxacin HCl (CIPRO) 250 MG tablet, Take 1 tablet (250 mg total) by mouth 2 (two) times daily. (Patient not taking: Reported on 5/3/2022), Disp: 10 tablet, Rfl: 0    fluconazole (DIFLUCAN) 150 MG Tab, Take 1 tablet today and repeat in 3 days, Disp: 2 tablet, Rfl: 0    lancets Misc, To check BG 2 times daily, to use with insurance preferred meter (Patient not taking: Reported on 5/3/2022), Disp: 180 each, Rfl: 3    LUMIGAN 0.01 % Drop, , Disp: , Rfl:     ondansetron (ZOFRAN-ODT) 4 MG TbDL, Take 1 tablet (4 mg total) by mouth every 6 (six) hours as needed (nausea/vomiting)., Disp: 12 tablet, Rfl: 0    Review of Systems   Constitutional: Negative for chills, fever and unexpected weight change (wt down 9lbs since Feb).   HENT: Negative for sore throat and trouble swallowing.    Eyes: Negative for visual disturbance.   Respiratory: Negative for cough, shortness of breath and wheezing.    Cardiovascular: Negative for chest pain, palpitations and leg swelling.  "  Gastrointestinal: Positive for diarrhea (see HPI- symptoms improving since last week), nausea and vomiting. Negative for abdominal pain, blood in stool and constipation.   Endocrine: Negative for polydipsia, polyphagia and polyuria.   Genitourinary: Positive for frequency and vaginal discharge (small amt of white discharge, +vaginal itching). Negative for difficulty urinating, dysuria, flank pain, hematuria and vaginal bleeding.   Musculoskeletal: Positive for back pain (chronic lower back pain with housework, takes tizanidine only occas). Negative for neck pain.   Skin: Negative for wound.   Neurological: Positive for numbness (tingling to bilat calves/feet). Negative for dizziness, weakness and headaches.   Psychiatric/Behavioral: Negative for dysphoric mood. The patient is not nervous/anxious.           Objective:      Vitals:    05/03/22 0937   BP: 130/64   Pulse: 78   Weight: 70.2 kg (154 lb 12.8 oz)   Height: 5' 4" (1.626 m)     Physical Exam  Vitals and nursing note reviewed.   Constitutional:       General: She is not in acute distress.     Appearance: Normal appearance. She is well-developed. She is not toxic-appearing.   HENT:      Head: Normocephalic and atraumatic.      Right Ear: Tympanic membrane and ear canal normal.      Left Ear: Tympanic membrane and ear canal normal.      Mouth/Throat:      Mouth: Mucous membranes are moist.   Neck:      Vascular: No carotid bruit.   Cardiovascular:      Rate and Rhythm: Normal rate and regular rhythm.      Heart sounds: Murmur (2nd ICS LSB) heard.    Systolic murmur is present with a grade of 2/6.    No friction rub. No gallop.   Pulmonary:      Effort: Pulmonary effort is normal. No respiratory distress.      Breath sounds: Normal breath sounds. No wheezing or rales.   Abdominal:      Palpations: Abdomen is soft.      Tenderness: There is no abdominal tenderness. There is no guarding or rebound.   Musculoskeletal:      Cervical back: Neck supple.      Right " lower leg: No edema.      Left lower leg: No edema.   Lymphadenopathy:      Cervical: No cervical adenopathy.   Skin:     General: Skin is warm and dry.      Findings: No rash.   Neurological:      General: No focal deficit present.      Mental Status: She is alert and oriented to person, place, and time.      Gait: Gait normal.   Psychiatric:         Mood and Affect: Mood normal.           Assessment:       1. DM type 2 with diabetic dyslipidemia    2. Essential hypertension    3. Vaginal candidiasis    4. Dyslipidemia    5. Acute gastroenteritis    6. Stage 3a chronic kidney disease           Plan:       DM type 2 with diabetic dyslipidemia  Comments:    A1c improved to 7.2%, given n/v/d last week advised to hold Ozempic dose this week and resume next week if symptoms resolved    Essential hypertension  Comments:  BP well controlled  Orders:  -     amLODIPine (NORVASC) 5 MG tablet; Take 1 tablet (5 mg total) by mouth once daily.  Dispense: 90 tablet; Refill: 1    Vaginal candidiasis  Comments:  pt advised I'll treat with diflucan 2 doses however cautioned of risk of urogenital mycotic infections wtih jardiance and if she has recurrent sxs may need to stop medication  Orders:  -     fluconazole (DIFLUCAN) 150 MG Tab; Take 1 tablet today and repeat in 3 days  Dispense: 2 tablet; Refill: 0    Dyslipidemia  Comments:  reviewed recent labs wtih pt, well controlled    Acute gastroenteritis  Comments:  pt advised given sudden onset of symptoms last week as well as high comunity spread of viral AGE suspect it's more AGE than SE of ozempic however hold dose this week and resume next week if symptoms resolved  Orders:  -     ondansetron (ZOFRAN-ODT) 4 MG TbDL; Take 1 tablet (4 mg total) by mouth every 6 (six) hours as needed (nausea/vomiting).  Dispense: 12 tablet; Refill: 0    Stage 3a chronic kidney disease  Comments:   stable on labs, encouraged to increase fluid intake d/t recent n/v/d      Follow up in about 3 months  (around 8/3/2022) for Diabetes.          Counseled on age and gender appropriate medical preventative services, including cancer screenings, immunizations, overall nutritional health, need for a consistent exercise regimen and an overall push towards maintaining a vigorous and active lifestyle.      5/3/2022 Priyanka Garibay NP

## 2022-05-19 ENCOUNTER — TELEPHONE (OUTPATIENT)
Dept: FAMILY MEDICINE | Facility: CLINIC | Age: 67
End: 2022-05-19

## 2022-06-06 DIAGNOSIS — I10 ESSENTIAL HYPERTENSION: ICD-10-CM

## 2022-06-06 DIAGNOSIS — E78.5 DM TYPE 2 WITH DIABETIC DYSLIPIDEMIA: ICD-10-CM

## 2022-06-06 DIAGNOSIS — E11.69 DM TYPE 2 WITH DIABETIC DYSLIPIDEMIA: ICD-10-CM

## 2022-06-06 RX ORDER — LISINOPRIL 20 MG/1
40 TABLET ORAL DAILY
Qty: 180 TABLET | Refills: 1 | Status: SHIPPED | OUTPATIENT
Start: 2022-06-06 | End: 2022-12-08 | Stop reason: SDUPTHER

## 2022-06-06 RX ORDER — BIMATOPROST 0.1 MG/ML
1 SOLUTION/ DROPS OPHTHALMIC NIGHTLY
Qty: 2.5 ML | Refills: 1 | Status: CANCELLED | OUTPATIENT
Start: 2022-06-06

## 2022-06-06 RX ORDER — EMPAGLIFLOZIN 25 MG/1
25 TABLET, FILM COATED ORAL DAILY
Qty: 90 TABLET | Refills: 1 | Status: SHIPPED | OUTPATIENT
Start: 2022-06-06 | End: 2022-12-08 | Stop reason: SDUPTHER

## 2022-06-06 RX ORDER — GLIPIZIDE 5 MG/1
5 TABLET ORAL 2 TIMES DAILY WITH MEALS
Qty: 180 TABLET | Refills: 1 | Status: SHIPPED | OUTPATIENT
Start: 2022-06-06 | End: 2022-12-08 | Stop reason: SDUPTHER

## 2022-06-06 NOTE — TELEPHONE ENCOUNTER
----- Message from Kelly Goldman sent at 6/6/2022  8:51 AM CDT -----  Pt calling for refill on Furosemide, Juardiance, eye drops and Lisinopril send to [facility] [address] [facility] cb # [phone]

## 2022-06-07 DIAGNOSIS — M51.36 DDD (DEGENERATIVE DISC DISEASE), LUMBAR: ICD-10-CM

## 2022-06-07 RX ORDER — MELOXICAM 15 MG/1
15 TABLET ORAL DAILY
Qty: 90 TABLET | Refills: 1 | Status: SHIPPED | OUTPATIENT
Start: 2022-06-07 | End: 2022-12-08 | Stop reason: SDUPTHER

## 2022-06-07 NOTE — TELEPHONE ENCOUNTER
----- Message from Leidy Thompson sent at 6/7/2022  3:53 PM CDT -----  Patient called and stated that she need a refill of  her meloxicam and her eye drops [facility] if any questions please give her a call at [phone]

## 2022-07-08 DIAGNOSIS — E11.65 TYPE 2 DIABETES MELLITUS WITH HYPERGLYCEMIA, WITHOUT LONG-TERM CURRENT USE OF INSULIN: ICD-10-CM

## 2022-07-08 NOTE — TELEPHONE ENCOUNTER
Spoke with pt in regards to message sent. Pt state that her pharmacy keep calling her and stating that she has a prescription she needs to . Verbalized that we have not sent her any prescription medication to her pharmacy since 06/07/2022. Verbalized to pt that she may need to call her pharmacy and see what provider sent her a prescription medication in.  Pt acknowledge understanding.    Pt also would like for us to refill her blood sugar strips to her pharmacy.

## 2022-07-08 NOTE — TELEPHONE ENCOUNTER
----- Message from Leidy Thompson sent at 7/8/2022  9:24 AM CDT -----  Patient called and stated that she keep getting calls from a pharmacy stating that she need a refill of a medicine she is not sure what its for she would like to speak to the nurse please give her a call at [phone]

## 2022-08-17 ENCOUNTER — OFFICE VISIT (OUTPATIENT)
Dept: FAMILY MEDICINE | Facility: CLINIC | Age: 67
End: 2022-08-17
Payer: COMMERCIAL

## 2022-08-17 VITALS
WEIGHT: 158 LBS | DIASTOLIC BLOOD PRESSURE: 76 MMHG | OXYGEN SATURATION: 99 % | HEIGHT: 64 IN | HEART RATE: 67 BPM | BODY MASS INDEX: 26.98 KG/M2 | SYSTOLIC BLOOD PRESSURE: 134 MMHG

## 2022-08-17 DIAGNOSIS — E78.5 DYSLIPIDEMIA: ICD-10-CM

## 2022-08-17 DIAGNOSIS — Z12.31 ENCOUNTER FOR SCREENING MAMMOGRAM FOR BREAST CANCER: ICD-10-CM

## 2022-08-17 DIAGNOSIS — M54.16 LUMBAR RADICULOPATHY: ICD-10-CM

## 2022-08-17 DIAGNOSIS — E78.5 DM TYPE 2 WITH DIABETIC DYSLIPIDEMIA: Primary | ICD-10-CM

## 2022-08-17 DIAGNOSIS — I10 ESSENTIAL HYPERTENSION: ICD-10-CM

## 2022-08-17 DIAGNOSIS — E11.69 DM TYPE 2 WITH DIABETIC DYSLIPIDEMIA: Primary | ICD-10-CM

## 2022-08-17 LAB — HBA1C MFR BLD: 7 %

## 2022-08-17 PROCEDURE — 3061F PR NEG MICROALBUMINURIA RESULT DOCUMENTED/REVIEW: ICD-10-PCS | Mod: CPTII,S$GLB,, | Performed by: NURSE PRACTITIONER

## 2022-08-17 PROCEDURE — 3075F PR MOST RECENT SYSTOLIC BLOOD PRESS GE 130-139MM HG: ICD-10-PCS | Mod: CPTII,S$GLB,, | Performed by: NURSE PRACTITIONER

## 2022-08-17 PROCEDURE — 83036 POCT HEMOGLOBIN A1C: ICD-10-PCS | Mod: QW,,, | Performed by: NURSE PRACTITIONER

## 2022-08-17 PROCEDURE — 1160F RVW MEDS BY RX/DR IN RCRD: CPT | Mod: CPTII,S$GLB,, | Performed by: NURSE PRACTITIONER

## 2022-08-17 PROCEDURE — 3008F BODY MASS INDEX DOCD: CPT | Mod: CPTII,S$GLB,, | Performed by: NURSE PRACTITIONER

## 2022-08-17 PROCEDURE — 3051F PR MOST RECENT HEMOGLOBIN A1C LEVEL 7.0 - < 8.0%: ICD-10-PCS | Mod: CPTII,S$GLB,, | Performed by: NURSE PRACTITIONER

## 2022-08-17 PROCEDURE — 3061F NEG MICROALBUMINURIA REV: CPT | Mod: CPTII,S$GLB,, | Performed by: NURSE PRACTITIONER

## 2022-08-17 PROCEDURE — 1101F PR PT FALLS ASSESS DOC 0-1 FALLS W/OUT INJ PAST YR: ICD-10-PCS | Mod: CPTII,S$GLB,, | Performed by: NURSE PRACTITIONER

## 2022-08-17 PROCEDURE — 3066F PR DOCUMENTATION OF TREATMENT FOR NEPHROPATHY: ICD-10-PCS | Mod: CPTII,S$GLB,, | Performed by: NURSE PRACTITIONER

## 2022-08-17 PROCEDURE — 1101F PT FALLS ASSESS-DOCD LE1/YR: CPT | Mod: CPTII,S$GLB,, | Performed by: NURSE PRACTITIONER

## 2022-08-17 PROCEDURE — 3078F DIAST BP <80 MM HG: CPT | Mod: CPTII,S$GLB,, | Performed by: NURSE PRACTITIONER

## 2022-08-17 PROCEDURE — 1160F PR REVIEW ALL MEDS BY PRESCRIBER/CLIN PHARMACIST DOCUMENTED: ICD-10-PCS | Mod: CPTII,S$GLB,, | Performed by: NURSE PRACTITIONER

## 2022-08-17 PROCEDURE — 3075F SYST BP GE 130 - 139MM HG: CPT | Mod: CPTII,S$GLB,, | Performed by: NURSE PRACTITIONER

## 2022-08-17 PROCEDURE — 4010F ACE/ARB THERAPY RXD/TAKEN: CPT | Mod: CPTII,S$GLB,, | Performed by: NURSE PRACTITIONER

## 2022-08-17 PROCEDURE — 1159F MED LIST DOCD IN RCRD: CPT | Mod: CPTII,S$GLB,, | Performed by: NURSE PRACTITIONER

## 2022-08-17 PROCEDURE — 99214 OFFICE O/P EST MOD 30 MIN: CPT | Mod: S$GLB,,, | Performed by: NURSE PRACTITIONER

## 2022-08-17 PROCEDURE — 83036 HEMOGLOBIN GLYCOSYLATED A1C: CPT | Mod: QW,,, | Performed by: NURSE PRACTITIONER

## 2022-08-17 PROCEDURE — 1159F PR MEDICATION LIST DOCUMENTED IN MEDICAL RECORD: ICD-10-PCS | Mod: CPTII,S$GLB,, | Performed by: NURSE PRACTITIONER

## 2022-08-17 PROCEDURE — 3288F FALL RISK ASSESSMENT DOCD: CPT | Mod: CPTII,S$GLB,, | Performed by: NURSE PRACTITIONER

## 2022-08-17 PROCEDURE — 3288F PR FALLS RISK ASSESSMENT DOCUMENTED: ICD-10-PCS | Mod: CPTII,S$GLB,, | Performed by: NURSE PRACTITIONER

## 2022-08-17 PROCEDURE — 3066F NEPHROPATHY DOC TX: CPT | Mod: CPTII,S$GLB,, | Performed by: NURSE PRACTITIONER

## 2022-08-17 PROCEDURE — 3078F PR MOST RECENT DIASTOLIC BLOOD PRESSURE < 80 MM HG: ICD-10-PCS | Mod: CPTII,S$GLB,, | Performed by: NURSE PRACTITIONER

## 2022-08-17 PROCEDURE — 99214 PR OFFICE/OUTPT VISIT, EST, LEVL IV, 30-39 MIN: ICD-10-PCS | Mod: S$GLB,,, | Performed by: NURSE PRACTITIONER

## 2022-08-17 PROCEDURE — 3008F PR BODY MASS INDEX (BMI) DOCUMENTED: ICD-10-PCS | Mod: CPTII,S$GLB,, | Performed by: NURSE PRACTITIONER

## 2022-08-17 PROCEDURE — 4010F PR ACE/ARB THEARPY RXD/TAKEN: ICD-10-PCS | Mod: CPTII,S$GLB,, | Performed by: NURSE PRACTITIONER

## 2022-08-17 PROCEDURE — 3051F HG A1C>EQUAL 7.0%<8.0%: CPT | Mod: CPTII,S$GLB,, | Performed by: NURSE PRACTITIONER

## 2022-08-17 RX ORDER — GABAPENTIN 100 MG/1
CAPSULE ORAL
Qty: 90 CAPSULE | Refills: 2 | Status: SHIPPED | OUTPATIENT
Start: 2022-08-17 | End: 2022-12-08 | Stop reason: SDUPTHER

## 2022-08-17 NOTE — PROGRESS NOTES
"  SUBJECTIVE:    Patient ID: Rhonda Mojica is a 67 y.o. female.    Chief Complaint: Diabetes (No bottles//Pt is here for 3 month check up//Pt c/o right hip and leg pain x 2 weeks//ref for mammo and pt would like the pna vaccine.//Leyda )    Pt here for DM, HTN, lipid f/u  Pt reports approx 2 weeks ago developed severe pain radiating from right buttock/hip radiating down right leg to foot. Reports could barely walk the first couple days- pain has gradually improved but still describes a "toothache type pain" from right lateral thigh down to foot. Denies any trauma/falls. No fever/chills. C/o's of urge incontinence but no loss of bowel/bladder control.      Admission on 04/04/2022, Discharged on 04/04/2022   Component Date Value Ref Range Status    Hepatitis C Ab 04/04/2022 Negative  Negative Final    HIV 1/2 Ag/Ab 04/04/2022 Negative  Negative Final    Group A Strep, Molecular 04/04/2022 Positive (A) Negative Final    Influenza A, Molecular 04/04/2022 Negative  Negative Final    Influenza B, Molecular 04/04/2022 Negative  Negative Final    Flu A & B Source 04/04/2022 Nasal swab   Final    SARS-CoV-2 RNA, Amplification, Qual 04/04/2022 Negative  Negative Final       Past Medical History:   Diagnosis Date    DDD (degenerative disc disease), lumbar     DM type 2 with diabetic dyslipidemia     Dyslipidemia     GERD (gastroesophageal reflux disease)     Hypertension      History reviewed. No pertinent surgical history.  History reviewed. No pertinent family history.    The 10-year CVD risk score (D'Agostino, et al., 2008) is: 19.1%    Values used to calculate the score:      Age: 67 years      Sex: Female      Diabetic: Yes      Tobacco smoker: No      Systolic Blood Pressure: 134 mmHg      Is BP treated: Yes      HDL Cholesterol: 41 mg/dL      Total Cholesterol: 132 mg/dL     Marital Status:   Alcohol History:  reports no history of alcohol use.  Tobacco History:  reports that she has never smoked. She " has never used smokeless tobacco.  Drug History:  reports no history of drug use.    Health Maintenance Topics with due status: Not Due       Topic Last Completion Date    Colorectal Cancer Screening 08/12/2020    Influenza Vaccine 11/20/2020    DEXA Scan 12/22/2020    Eye Exam 11/18/2021    Diabetes Urine Screening 04/27/2022    Lipid Panel 04/27/2022    Hemoglobin A1c 04/27/2022    Low Dose Statin 05/03/2022    Foot Exam 08/17/2022     Immunization History   Administered Date(s) Administered    COVID-19, MRNA, LN-S, PF (MODERNA FULL 0.5 ML DOSE) 03/30/2021, 04/27/2021    Influenza - Quadrivalent - High Dose - PF (65 years and older) 11/20/2020    Influenza - Trivalent (ADULT) 08/29/2017    Influenza Split 08/29/2017    Pneumococcal Polysaccharide - 23 Valent 04/28/2016       Review of patient's allergies indicates:  No Known Allergies    Current Outpatient Medications:     alcohol swabs (ALCOHOL WIPES) PadM, Apply 1 each topically once daily., Disp: 200 each, Rfl: 1    amLODIPine (NORVASC) 5 MG tablet, Take 1 tablet (5 mg total) by mouth once daily., Disp: 90 tablet, Rfl: 1    aspirin 325 MG tablet, Take 325 mg by mouth once daily. , Disp: , Rfl:     blood sugar diagnostic Strp, To check BG 2 times daily, to use with insurance preferred meter, Disp: 180 strip, Rfl: 3    blood-glucose meter kit, To check BG 2 times daily, to use with insurance preferred meter, Disp: 1 each, Rfl: 0    calcium-vitamin D tablet 600 mg-200 units, Take 1 tablet by mouth once daily. , Disp: , Rfl:     cholecalciferol, vitamin D3, (VITAMIN D3) 2,000 unit Cap, Take 1 capsule by mouth once daily. , Disp: , Rfl:     empagliflozin (JARDIANCE) 25 mg tablet, Take 1 tablet (25 mg total) by mouth once daily., Disp: 90 tablet, Rfl: 1    gabapentin (NEURONTIN) 100 MG capsule, Take 1-2 capsules at bedtime, may increase to 1 capsule in AM and 2 capsules at bedtime if needed for pain, Disp: 90 capsule, Rfl: 2    glipiZIDE (GLUCOTROL) 5  MG tablet, Take 1 tablet (5 mg total) by mouth 2 (two) times daily with meals., Disp: 180 tablet, Rfl: 1    lancets Misc, To check BG 2 times daily, to use with insurance preferred meter (Patient not taking: Reported on 5/3/2022), Disp: 180 each, Rfl: 3    lisinopriL (PRINIVIL,ZESTRIL) 20 MG tablet, Take 2 tablets (40 mg total) by mouth once daily., Disp: 180 tablet, Rfl: 1    LUMIGAN 0.01 % Drop, , Disp: , Rfl:     meloxicam (MOBIC) 15 MG tablet, Take 1 tablet (15 mg total) by mouth once daily., Disp: 90 tablet, Rfl: 1    metFORMIN (GLUCOPHAGE) 1000 MG tablet, Take 1 tablet (1,000 mg total) by mouth 2 (two) times daily with meals., Disp: 180 tablet, Rfl: 1    metoprolol succinate (TOPROL-XL) 50 MG 24 hr tablet, Take 1 tablet (50 mg total) by mouth once daily., Disp: 90 tablet, Rfl: 1    omeprazole (PRILOSEC) 20 MG capsule, Take 1 capsule (20 mg total) by mouth once daily., Disp: 90 capsule, Rfl: 1    ondansetron (ZOFRAN-ODT) 4 MG TbDL, Take 1 tablet (4 mg total) by mouth every 6 (six) hours as needed (nausea/vomiting)., Disp: 12 tablet, Rfl: 0    pravastatin (PRAVACHOL) 40 MG tablet, Take 1 tablet (40 mg total) by mouth once daily. For cholesterol., Disp: 90 tablet, Rfl: 1    semaglutide (OZEMPIC) 1 mg/dose (2 mg/1.5 mL) PnIj, Inject 1 mg into the skin once a week., Disp: 3 pen, Rfl: 1    tiZANidine (ZANAFLEX) 4 MG tablet, Take 1 tablet (4 mg total) by mouth nightly as needed (back pain/muscle spasm)., Disp: 90 tablet, Rfl: 1    Review of Systems   Constitutional: Negative for appetite change, chills, fever and unexpected weight change.   HENT: Negative for sore throat and trouble swallowing.    Eyes: Negative for visual disturbance.   Respiratory: Negative for cough, shortness of breath and wheezing.    Cardiovascular: Negative for chest pain, palpitations and leg swelling.   Gastrointestinal: Negative for abdominal pain, blood in stool, constipation, diarrhea, nausea and vomiting.   Endocrine: Negative  "for polydipsia, polyphagia and polyuria.   Genitourinary: Positive for frequency and urgency. Negative for difficulty urinating, dysuria, flank pain and hematuria.   Musculoskeletal: Positive for back pain (right lower back/buttock pain radiating down right leg). Negative for gait problem and neck pain.   Skin: Negative for wound.   Neurological: Negative for dizziness, syncope, speech difficulty, weakness, numbness and headaches.   Psychiatric/Behavioral: Negative for dysphoric mood. The patient is not nervous/anxious.           Objective:      Vitals:    08/17/22 1155   BP: 134/76   Pulse: 67   SpO2: 99%   Weight: 71.7 kg (158 lb)   Height: 5' 4" (1.626 m)     Physical Exam  Vitals and nursing note reviewed.   Constitutional:       General: She is not in acute distress.     Appearance: Normal appearance. She is well-developed. She is not toxic-appearing.   HENT:      Head: Normocephalic and atraumatic.      Right Ear: Tympanic membrane and ear canal normal.      Left Ear: Tympanic membrane and ear canal normal.   Neck:      Vascular: No carotid bruit.   Cardiovascular:      Rate and Rhythm: Normal rate and regular rhythm.      Pulses:           Dorsalis pedis pulses are 2+ on the right side and 2+ on the left side.      Heart sounds: Murmur (2nd ICS LSB) heard.    Systolic murmur is present with a grade of 2/6.    No friction rub. No gallop.   Pulmonary:      Effort: Pulmonary effort is normal. No respiratory distress.      Breath sounds: Normal breath sounds. No wheezing or rales.   Abdominal:      Palpations: Abdomen is soft.      Tenderness: There is no abdominal tenderness. There is no guarding or rebound.   Musculoskeletal:      Cervical back: Neck supple.      Lumbar back: Tenderness present. No swelling, edema or bony tenderness. Negative right straight leg raise test and negative left straight leg raise test.        Back:       Right lower leg: No edema.      Left lower leg: No edema.   Lymphadenopathy:    "   Cervical: No cervical adenopathy.   Skin:     General: Skin is warm and dry.      Findings: No rash.   Neurological:      General: No focal deficit present.      Mental Status: She is alert and oriented to person, place, and time.      Motor: Motor function is intact.      Gait: Gait normal.      Deep Tendon Reflexes:      Reflex Scores:       Patellar reflexes are 2+ on the right side and 2+ on the left side.  Psychiatric:         Mood and Affect: Mood normal.           Assessment:       1. DM type 2 with diabetic dyslipidemia    2. Lumbar radiculopathy    3. Essential hypertension    4. Dyslipidemia    5. Encounter for screening mammogram for breast cancer           Plan:       DM type 2 with diabetic dyslipidemia  Comments:  Well controlled with A1c 7.0%, continue current meds  Orders:  -     Hemoglobin A1C, POCT  -     HM DIABETES FOOT EXAM    Lumbar radiculopathy  Comments:  Pt advised symptomsc/w lumbar radiculopathy.  She has hx of chronic lumbar lumbago though this radicular pain is new and worsening, will send for MRI, add gabap  Orders:  -     gabapentin (NEURONTIN) 100 MG capsule; Take 1-2 capsules at bedtime, may increase to 1 capsule in AM and 2 capsules at bedtime if needed for pain  Dispense: 90 capsule; Refill: 2  -     MRI Lumbar Spine Without Contrast; Future; Expected date: 08/17/2022    Essential hypertension  Comments:  BP well controlled    Dyslipidemia  Comments:  Well controlled on last labs    Encounter for screening mammogram for breast cancer  -     Mammo Digital Screening Bilat; Future; Expected date: 08/17/2022      Follow up in about 3 weeks (around 9/7/2022) for lumbar radiculopathy.          Counseled on age and gender appropriate medical preventative services, including cancer screenings, immunizations, overall nutritional health, need for a consistent exercise regimen and an overall push towards maintaining a vigorous and active lifestyle.      8/17/2022 Priyanka Garibay NP

## 2022-08-25 ENCOUNTER — TELEPHONE (OUTPATIENT)
Dept: FAMILY MEDICINE | Facility: CLINIC | Age: 67
End: 2022-08-25

## 2022-08-25 DIAGNOSIS — M54.16 LUMBAR RADICULOPATHY: Primary | ICD-10-CM

## 2022-08-25 NOTE — TELEPHONE ENCOUNTER
Please call patient let her know insurance will not approve her MRI until she has had back x-rays and done 4-6 weeks of physical therapy.  I have ordered the lumbar x-ray and PT at [facility] who should be contacting her

## 2022-08-25 NOTE — TELEPHONE ENCOUNTER
Spoke with pt in regards to message sent. Verbalized per Priyanka that pt's insurance will not approve her MRI until she has had back x-rays and done 4-6 weeks of physical therapy. Priyanka has ordered the lumbar x-ray and PT at [facility] who should be contacting her. Pt acknowledge understanding.

## 2022-08-31 ENCOUNTER — HOSPITAL ENCOUNTER (OUTPATIENT)
Dept: RADIOLOGY | Facility: HOSPITAL | Age: 67
Discharge: HOME | End: 2022-08-31
Attending: NURSE PRACTITIONER
Payer: COMMERCIAL

## 2022-08-31 DIAGNOSIS — M54.16 LUMBAR RADICULOPATHY: ICD-10-CM

## 2022-08-31 PROCEDURE — 72110 X-RAY EXAM L-2 SPINE 4/>VWS: CPT | Mod: TC,PO

## 2022-09-01 ENCOUNTER — TELEPHONE (OUTPATIENT)
Dept: FAMILY MEDICINE | Facility: CLINIC | Age: 67
End: 2022-09-01

## 2022-09-01 NOTE — TELEPHONE ENCOUNTER
Spoke with pt in regards to recent xray results. Verbalized per Priyanka that her lumbar x-ray shows arthritis changes throughout the lumbar spine including loss of disc height as well as bone spurs, no compression fractures. Priyanka would recommend trying the physical therapy to help with pain but if it is not improving let us know and we can order MRI/refer to pain management for possible injections. Pt acknowledge understanding.

## 2022-09-01 NOTE — TELEPHONE ENCOUNTER
----- Message from Priyanka Garibay NP sent at 8/31/2022  5:08 PM CDT -----  Please call patient and let her know lumbar x-ray shows arthritis changes throughout the lumbar spine including loss of disc height as well as bone spurs, no compression fractures.  I would recommend trying the physical therapy to help with pain but if it is not improving let us know and we can order MRI/refer to pain management for possible injections

## 2022-09-08 ENCOUNTER — OFFICE VISIT (OUTPATIENT)
Dept: FAMILY MEDICINE | Facility: CLINIC | Age: 67
End: 2022-09-08
Payer: COMMERCIAL

## 2022-09-08 VITALS
OXYGEN SATURATION: 98 % | HEART RATE: 73 BPM | WEIGHT: 165.81 LBS | BODY MASS INDEX: 28.31 KG/M2 | SYSTOLIC BLOOD PRESSURE: 130 MMHG | HEIGHT: 64 IN | DIASTOLIC BLOOD PRESSURE: 60 MMHG

## 2022-09-08 DIAGNOSIS — I10 ESSENTIAL HYPERTENSION: ICD-10-CM

## 2022-09-08 DIAGNOSIS — M54.16 LUMBAR RADICULOPATHY: Primary | ICD-10-CM

## 2022-09-08 DIAGNOSIS — Z23 NEED FOR VACCINATION AGAINST STREPTOCOCCUS PNEUMONIAE: ICD-10-CM

## 2022-09-08 DIAGNOSIS — E11.69 DM TYPE 2 WITH DIABETIC DYSLIPIDEMIA: ICD-10-CM

## 2022-09-08 DIAGNOSIS — E78.5 DM TYPE 2 WITH DIABETIC DYSLIPIDEMIA: ICD-10-CM

## 2022-09-08 PROCEDURE — 3061F PR NEG MICROALBUMINURIA RESULT DOCUMENTED/REVIEW: ICD-10-PCS | Mod: CPTII,S$GLB,, | Performed by: NURSE PRACTITIONER

## 2022-09-08 PROCEDURE — G0009 PNEUMOCOCCAL CONJUGATE VACCINE 20-VALENT: ICD-10-PCS | Mod: S$GLB,,, | Performed by: NURSE PRACTITIONER

## 2022-09-08 PROCEDURE — 4010F PR ACE/ARB THEARPY RXD/TAKEN: ICD-10-PCS | Mod: CPTII,S$GLB,, | Performed by: NURSE PRACTITIONER

## 2022-09-08 PROCEDURE — 3078F DIAST BP <80 MM HG: CPT | Mod: CPTII,S$GLB,, | Performed by: NURSE PRACTITIONER

## 2022-09-08 PROCEDURE — 90677 PCV20 VACCINE IM: CPT | Mod: S$GLB,,, | Performed by: NURSE PRACTITIONER

## 2022-09-08 PROCEDURE — 3066F PR DOCUMENTATION OF TREATMENT FOR NEPHROPATHY: ICD-10-PCS | Mod: CPTII,S$GLB,, | Performed by: NURSE PRACTITIONER

## 2022-09-08 PROCEDURE — 3051F HG A1C>EQUAL 7.0%<8.0%: CPT | Mod: CPTII,S$GLB,, | Performed by: NURSE PRACTITIONER

## 2022-09-08 PROCEDURE — 99213 PR OFFICE/OUTPT VISIT, EST, LEVL III, 20-29 MIN: ICD-10-PCS | Mod: 25,S$GLB,, | Performed by: NURSE PRACTITIONER

## 2022-09-08 PROCEDURE — 1159F PR MEDICATION LIST DOCUMENTED IN MEDICAL RECORD: ICD-10-PCS | Mod: CPTII,S$GLB,, | Performed by: NURSE PRACTITIONER

## 2022-09-08 PROCEDURE — 3066F NEPHROPATHY DOC TX: CPT | Mod: CPTII,S$GLB,, | Performed by: NURSE PRACTITIONER

## 2022-09-08 PROCEDURE — 3051F PR MOST RECENT HEMOGLOBIN A1C LEVEL 7.0 - < 8.0%: ICD-10-PCS | Mod: CPTII,S$GLB,, | Performed by: NURSE PRACTITIONER

## 2022-09-08 PROCEDURE — 90677 PNEUMOCOCCAL CONJUGATE VACCINE 20-VALENT: ICD-10-PCS | Mod: S$GLB,,, | Performed by: NURSE PRACTITIONER

## 2022-09-08 PROCEDURE — 4010F ACE/ARB THERAPY RXD/TAKEN: CPT | Mod: CPTII,S$GLB,, | Performed by: NURSE PRACTITIONER

## 2022-09-08 PROCEDURE — 99213 OFFICE O/P EST LOW 20 MIN: CPT | Mod: 25,S$GLB,, | Performed by: NURSE PRACTITIONER

## 2022-09-08 PROCEDURE — 1160F RVW MEDS BY RX/DR IN RCRD: CPT | Mod: CPTII,S$GLB,, | Performed by: NURSE PRACTITIONER

## 2022-09-08 PROCEDURE — 3008F BODY MASS INDEX DOCD: CPT | Mod: CPTII,S$GLB,, | Performed by: NURSE PRACTITIONER

## 2022-09-08 PROCEDURE — 3078F PR MOST RECENT DIASTOLIC BLOOD PRESSURE < 80 MM HG: ICD-10-PCS | Mod: CPTII,S$GLB,, | Performed by: NURSE PRACTITIONER

## 2022-09-08 PROCEDURE — 3075F SYST BP GE 130 - 139MM HG: CPT | Mod: CPTII,S$GLB,, | Performed by: NURSE PRACTITIONER

## 2022-09-08 PROCEDURE — 3061F NEG MICROALBUMINURIA REV: CPT | Mod: CPTII,S$GLB,, | Performed by: NURSE PRACTITIONER

## 2022-09-08 PROCEDURE — 1159F MED LIST DOCD IN RCRD: CPT | Mod: CPTII,S$GLB,, | Performed by: NURSE PRACTITIONER

## 2022-09-08 PROCEDURE — 3075F PR MOST RECENT SYSTOLIC BLOOD PRESS GE 130-139MM HG: ICD-10-PCS | Mod: CPTII,S$GLB,, | Performed by: NURSE PRACTITIONER

## 2022-09-08 PROCEDURE — 3008F PR BODY MASS INDEX (BMI) DOCUMENTED: ICD-10-PCS | Mod: CPTII,S$GLB,, | Performed by: NURSE PRACTITIONER

## 2022-09-08 PROCEDURE — G0009 ADMIN PNEUMOCOCCAL VACCINE: HCPCS | Mod: S$GLB,,, | Performed by: NURSE PRACTITIONER

## 2022-09-08 PROCEDURE — 1160F PR REVIEW ALL MEDS BY PRESCRIBER/CLIN PHARMACIST DOCUMENTED: ICD-10-PCS | Mod: CPTII,S$GLB,, | Performed by: NURSE PRACTITIONER

## 2022-09-08 NOTE — PROGRESS NOTES
SUBJECTIVE:    Patient ID: Rhonda Mojica is a 67 y.o. female.    Chief Complaint: Follow-up (No bottles/Pt is here for a f/u./Pt states the medication she was prescribe for her back has been helping./ Pt would like her PNA vaccine./BG)    Pt here for 3 week f/u- lumbar radiculopathy. Since last visit pt reports started gabapentin 100mg at night and that seems to have really helpd her leg pain. Reports some mild residual pain occasionally but overall much better. Didn't start PT as ordered as she went out of town and doesn't think she needs it. No leg weakness or falls.  Had lumbar x-ray which showed multilevel degenerative changes, no compression fracture.  MRI was not approved by insurance      Office Visit on 08/17/2022   Component Date Value Ref Range Status    Hemoglobin A1C 08/17/2022 7.0  % Final   Admission on 04/04/2022, Discharged on 04/04/2022   Component Date Value Ref Range Status    Hepatitis C Ab 04/04/2022 Negative  Negative Final    HIV 1/2 Ag/Ab 04/04/2022 Negative  Negative Final    Group A Strep, Molecular 04/04/2022 Positive (A)  Negative Final    Influenza A, Molecular 04/04/2022 Negative  Negative Final    Influenza B, Molecular 04/04/2022 Negative  Negative Final    Flu A & B Source 04/04/2022 Nasal swab   Final    SARS-CoV-2 RNA, Amplification, Qual 04/04/2022 Negative  Negative Final       Past Medical History:   Diagnosis Date    DDD (degenerative disc disease), lumbar     DM type 2 with diabetic dyslipidemia     Dyslipidemia     GERD (gastroesophageal reflux disease)     Hypertension      History reviewed. No pertinent surgical history.  History reviewed. No pertinent family history.    The 10-year CVD risk score (D'Agostino, et al., 2008) is: 17.7%    Values used to calculate the score:      Age: 67 years      Sex: Female      Diabetic: Yes      Tobacco smoker: No      Systolic Blood Pressure: 130 mmHg      Is BP treated: Yes      HDL Cholesterol: 41 mg/dL      Total Cholesterol: 132  mg/dL     Marital Status:   Alcohol History:  reports no history of alcohol use.  Tobacco History:  reports that she has never smoked. She has never used smokeless tobacco.  Drug History:  reports no history of drug use.    Health Maintenance Topics with due status: Not Due       Topic Last Completion Date    Colorectal Cancer Screening 08/12/2020    DEXA Scan 12/22/2020    Eye Exam 11/18/2021    Diabetes Urine Screening 04/27/2022    Lipid Panel 04/27/2022    Low Dose Statin 05/03/2022    Foot Exam 08/17/2022    Hemoglobin A1c 08/17/2022     Immunization History   Administered Date(s) Administered    COVID-19, MRNA, LN-S, PF (MODERNA FULL 0.5 ML DOSE) 03/30/2021, 04/27/2021    Influenza - Quadrivalent - High Dose - PF (65 years and older) 11/20/2020    Influenza - Trivalent (ADULT) 08/29/2017    Influenza Split 08/29/2017    Pneumococcal Conjugate - 20 Valent 09/08/2022    Pneumococcal Polysaccharide - 23 Valent 04/28/2016       Review of patient's allergies indicates:  No Known Allergies    Current Outpatient Medications:     alcohol swabs (ALCOHOL WIPES) PadM, Apply 1 each topically once daily., Disp: 200 each, Rfl: 1    amLODIPine (NORVASC) 5 MG tablet, Take 1 tablet (5 mg total) by mouth once daily., Disp: 90 tablet, Rfl: 1    aspirin 325 MG tablet, Take 325 mg by mouth once daily. , Disp: , Rfl:     blood sugar diagnostic Strp, To check BG 2 times daily, to use with insurance preferred meter, Disp: 180 strip, Rfl: 3    blood-glucose meter kit, To check BG 2 times daily, to use with insurance preferred meter, Disp: 1 each, Rfl: 0    calcium-vitamin D tablet 600 mg-200 units, Take 1 tablet by mouth once daily. , Disp: , Rfl:     cholecalciferol, vitamin D3, (VITAMIN D3) 2,000 unit Cap, Take 1 capsule by mouth once daily. , Disp: , Rfl:     empagliflozin (JARDIANCE) 25 mg tablet, Take 1 tablet (25 mg total) by mouth once daily., Disp: 90 tablet, Rfl: 1    gabapentin (NEURONTIN) 100 MG capsule, Take 1-2  capsules at bedtime, may increase to 1 capsule in AM and 2 capsules at bedtime if needed for pain, Disp: 90 capsule, Rfl: 2    glipiZIDE (GLUCOTROL) 5 MG tablet, Take 1 tablet (5 mg total) by mouth 2 (two) times daily with meals., Disp: 180 tablet, Rfl: 1    lancets Misc, To check BG 2 times daily, to use with insurance preferred meter (Patient not taking: Reported on 5/3/2022), Disp: 180 each, Rfl: 3    lisinopriL (PRINIVIL,ZESTRIL) 20 MG tablet, Take 2 tablets (40 mg total) by mouth once daily., Disp: 180 tablet, Rfl: 1    LUMIGAN 0.01 % Drop, , Disp: , Rfl:     meloxicam (MOBIC) 15 MG tablet, Take 1 tablet (15 mg total) by mouth once daily., Disp: 90 tablet, Rfl: 1    metFORMIN (GLUCOPHAGE) 1000 MG tablet, Take 1 tablet (1,000 mg total) by mouth 2 (two) times daily with meals., Disp: 180 tablet, Rfl: 1    metoprolol succinate (TOPROL-XL) 50 MG 24 hr tablet, Take 1 tablet (50 mg total) by mouth once daily., Disp: 90 tablet, Rfl: 1    omeprazole (PRILOSEC) 20 MG capsule, Take 1 capsule (20 mg total) by mouth once daily., Disp: 90 capsule, Rfl: 1    ondansetron (ZOFRAN-ODT) 4 MG TbDL, Take 1 tablet (4 mg total) by mouth every 6 (six) hours as needed (nausea/vomiting)., Disp: 12 tablet, Rfl: 0    pravastatin (PRAVACHOL) 40 MG tablet, Take 1 tablet (40 mg total) by mouth once daily. For cholesterol., Disp: 90 tablet, Rfl: 1    semaglutide (OZEMPIC) 1 mg/dose (2 mg/1.5 mL) PnIj, Inject 1 mg into the skin once a week., Disp: 3 pen, Rfl: 1    tiZANidine (ZANAFLEX) 4 MG tablet, Take 1 tablet (4 mg total) by mouth nightly as needed (back pain/muscle spasm)., Disp: 90 tablet, Rfl: 1    Review of Systems   Constitutional:  Negative for appetite change, chills, fever and unexpected weight change.   Respiratory:  Negative for cough, shortness of breath and wheezing.    Cardiovascular:  Negative for chest pain, palpitations and leg swelling.   Gastrointestinal:  Negative for abdominal pain, blood in stool, constipation,  "diarrhea, nausea and vomiting.   Genitourinary:  Positive for frequency and urgency. Negative for dysuria, flank pain and hematuria.   Musculoskeletal:  Positive for back pain (right leg sciatica pain improved with gabapentin). Negative for gait problem and neck pain.   Skin:  Negative for wound.   Neurological:  Negative for dizziness, syncope, speech difficulty, weakness, numbness and headaches.        Objective:      Vitals:    09/08/22 0910   BP: 130/60   Pulse: 73   SpO2: 98%   Weight: 75.2 kg (165 lb 12.8 oz)   Height: 5' 4" (1.626 m)     Physical Exam  Vitals and nursing note reviewed.   Constitutional:       General: She is not in acute distress.     Appearance: Normal appearance. She is well-developed. She is not toxic-appearing.   HENT:      Head: Normocephalic and atraumatic.   Cardiovascular:      Rate and Rhythm: Normal rate and regular rhythm.      Pulses:           Dorsalis pedis pulses are 2+ on the right side and 2+ on the left side.      Heart sounds: Murmur (2nd ICS LSB) heard.   Systolic murmur is present with a grade of 2/6.     No friction rub. No gallop.   Pulmonary:      Effort: Pulmonary effort is normal. No respiratory distress.      Breath sounds: Normal breath sounds. No wheezing or rales.   Abdominal:      Palpations: Abdomen is soft.   Musculoskeletal:      Lumbar back: No swelling, edema, tenderness or bony tenderness. Normal range of motion. Negative right straight leg raise test and negative left straight leg raise test.      Right lower leg: No edema.      Left lower leg: No edema.   Skin:     General: Skin is warm and dry.      Findings: No rash.   Neurological:      General: No focal deficit present.      Mental Status: She is alert and oriented to person, place, and time.      Motor: Motor function is intact.      Gait: Gait normal.   Psychiatric:         Mood and Affect: Mood normal.         Assessment:       1. Lumbar radiculopathy    2. Need for vaccination against " Streptococcus pneumoniae    3. DM type 2 with diabetic dyslipidemia    4. Essential hypertension           Plan:       Lumbar radiculopathy  Comments:  Improved with gabapentin, patient declines PT at this time.  Discussed back stretching exercises and call if pain returns    Need for vaccination against Streptococcus pneumoniae  -     Pneumococcal Conjugate Vaccine (20 Valent) (IM)    DM type 2 with diabetic dyslipidemia  Comments:  Controlled with last A1c 7.0%    Essential hypertension  Comments:  BP well controlled    Follow up in about 3 months (around 12/8/2022) for Diabetes, lumbar DDD.            9/8/2022 Priyanka Garibay NP

## 2022-09-26 ENCOUNTER — HOSPITAL ENCOUNTER (OUTPATIENT)
Dept: RADIOLOGY | Facility: HOSPITAL | Age: 67
Discharge: HOME | End: 2022-09-26
Attending: NURSE PRACTITIONER
Payer: COMMERCIAL

## 2022-09-26 VITALS — HEIGHT: 64 IN | WEIGHT: 165.81 LBS | BODY MASS INDEX: 28.31 KG/M2

## 2022-09-26 DIAGNOSIS — Z12.31 ENCOUNTER FOR SCREENING MAMMOGRAM FOR BREAST CANCER: ICD-10-CM

## 2022-09-26 DIAGNOSIS — R92.8 ABNORMAL MAMMOGRAM OF LEFT BREAST: Primary | ICD-10-CM

## 2022-09-26 PROCEDURE — 77067 SCR MAMMO BI INCL CAD: CPT | Mod: TC,PO

## 2022-09-27 ENCOUNTER — TELEPHONE (OUTPATIENT)
Dept: FAMILY MEDICINE | Facility: CLINIC | Age: 67
End: 2022-09-27

## 2022-09-27 NOTE — TELEPHONE ENCOUNTER
----- Message from Priyanka Garibay NP sent at 9/26/2022  8:05 PM CDT -----  Please call pt and let her know mammogram shows nodules in left breast and ultrasound is recommended to evaluate further. I have ordered US and [facility] should call her to schedule appt

## 2022-09-27 NOTE — TELEPHONE ENCOUNTER
----- Message from Rody Rose MA sent at 9/27/2022  9:39 AM CDT -----  Pt returning damián's call for mammogram results.

## 2022-09-27 NOTE — TELEPHONE ENCOUNTER
Spoke with pt in regards to recent mammogram results. Verbalized per Priyanka that pt's mammogram shows nodules in left breast and ultrasound is recommended to evaluate further. Priyanka has ordered US and [facility] should call her to schedule appt. Pt acknowledge understanding.

## 2022-10-13 ENCOUNTER — HOSPITAL ENCOUNTER (OUTPATIENT)
Dept: RADIOLOGY | Facility: HOSPITAL | Age: 67
Discharge: HOME | End: 2022-10-13
Attending: NURSE PRACTITIONER
Payer: COMMERCIAL

## 2022-10-13 DIAGNOSIS — R92.8 ABNORMAL MAMMOGRAM OF LEFT BREAST: ICD-10-CM

## 2022-10-13 PROCEDURE — 76642 ULTRASOUND BREAST LIMITED: CPT | Mod: TC,PO,LT

## 2022-10-14 ENCOUNTER — TELEPHONE (OUTPATIENT)
Dept: FAMILY MEDICINE | Facility: CLINIC | Age: 67
End: 2022-10-14

## 2022-10-14 NOTE — TELEPHONE ENCOUNTER
----- Message from Priyanka Garibay NP sent at 10/13/2022  9:07 PM CDT -----  Please call patient and let her know left breast ultrasound continues to show mass which appears to represent a complicated cyst.  Radiologist recommends repeating in 6 months to make sure this remains stable.

## 2022-10-14 NOTE — TELEPHONE ENCOUNTER
Spoke with pt in regards to recent ultrasound results. Verbalized per Priyanka that left breast ultrasound continues to show mass which appears to represent a complicated cyst.  Radiologist recommends repeating in 6 months to make sure this remains stable. Pt acknowledge understanding.

## 2022-10-26 DIAGNOSIS — E11.69 DM TYPE 2 WITH DIABETIC DYSLIPIDEMIA: ICD-10-CM

## 2022-10-26 DIAGNOSIS — E78.5 DYSLIPIDEMIA: ICD-10-CM

## 2022-10-26 DIAGNOSIS — E78.5 DM TYPE 2 WITH DIABETIC DYSLIPIDEMIA: ICD-10-CM

## 2022-10-26 RX ORDER — PRAVASTATIN SODIUM 40 MG/1
40 TABLET ORAL DAILY
Qty: 90 TABLET | Refills: 1 | Status: SHIPPED | OUTPATIENT
Start: 2022-10-26 | End: 2023-05-25 | Stop reason: SDUPTHER

## 2022-10-26 RX ORDER — SEMAGLUTIDE 1.34 MG/ML
1 INJECTION, SOLUTION SUBCUTANEOUS WEEKLY
Qty: 3 PEN | Refills: 1 | Status: SHIPPED | OUTPATIENT
Start: 2022-10-26 | End: 2023-03-08

## 2022-10-26 NOTE — TELEPHONE ENCOUNTER
----- Message from Leidy Thompson sent at 10/26/2022  8:50 AM CDT -----  Patient called and stated that she need a refill on her semaglutide and her pravastatin called into [facility] on [address] if any questions please give her a call at [phone]

## 2022-12-08 ENCOUNTER — OFFICE VISIT (OUTPATIENT)
Dept: FAMILY MEDICINE | Facility: CLINIC | Age: 67
End: 2022-12-08
Payer: COMMERCIAL

## 2022-12-08 VITALS
BODY MASS INDEX: 29.02 KG/M2 | HEART RATE: 64 BPM | DIASTOLIC BLOOD PRESSURE: 62 MMHG | WEIGHT: 170 LBS | HEIGHT: 64 IN | SYSTOLIC BLOOD PRESSURE: 136 MMHG

## 2022-12-08 DIAGNOSIS — I10 ESSENTIAL HYPERTENSION: ICD-10-CM

## 2022-12-08 DIAGNOSIS — M54.16 LUMBAR RADICULOPATHY: ICD-10-CM

## 2022-12-08 DIAGNOSIS — Z13.820 SPECIAL SCREENING FOR OSTEOPOROSIS: ICD-10-CM

## 2022-12-08 DIAGNOSIS — E78.5 DYSLIPIDEMIA: ICD-10-CM

## 2022-12-08 DIAGNOSIS — K21.9 GASTROESOPHAGEAL REFLUX DISEASE WITHOUT ESOPHAGITIS: ICD-10-CM

## 2022-12-08 DIAGNOSIS — Z78.0 MENOPAUSE: ICD-10-CM

## 2022-12-08 DIAGNOSIS — E11.65 TYPE 2 DIABETES MELLITUS WITH HYPERGLYCEMIA, WITHOUT LONG-TERM CURRENT USE OF INSULIN: Primary | ICD-10-CM

## 2022-12-08 LAB — HBA1C MFR BLD: 9 %

## 2022-12-08 PROCEDURE — 1160F PR REVIEW ALL MEDS BY PRESCRIBER/CLIN PHARMACIST DOCUMENTED: ICD-10-PCS | Mod: CPTII,S$GLB,, | Performed by: NURSE PRACTITIONER

## 2022-12-08 PROCEDURE — 4010F PR ACE/ARB THEARPY RXD/TAKEN: ICD-10-PCS | Mod: CPTII,S$GLB,, | Performed by: NURSE PRACTITIONER

## 2022-12-08 PROCEDURE — 3078F PR MOST RECENT DIASTOLIC BLOOD PRESSURE < 80 MM HG: ICD-10-PCS | Mod: CPTII,S$GLB,, | Performed by: NURSE PRACTITIONER

## 2022-12-08 PROCEDURE — 99214 OFFICE O/P EST MOD 30 MIN: CPT | Mod: S$GLB,,, | Performed by: NURSE PRACTITIONER

## 2022-12-08 PROCEDURE — 3075F PR MOST RECENT SYSTOLIC BLOOD PRESS GE 130-139MM HG: ICD-10-PCS | Mod: CPTII,S$GLB,, | Performed by: NURSE PRACTITIONER

## 2022-12-08 PROCEDURE — 3008F BODY MASS INDEX DOCD: CPT | Mod: CPTII,S$GLB,, | Performed by: NURSE PRACTITIONER

## 2022-12-08 PROCEDURE — 99214 PR OFFICE/OUTPT VISIT, EST, LEVL IV, 30-39 MIN: ICD-10-PCS | Mod: S$GLB,,, | Performed by: NURSE PRACTITIONER

## 2022-12-08 PROCEDURE — 3061F NEG MICROALBUMINURIA REV: CPT | Mod: CPTII,S$GLB,, | Performed by: NURSE PRACTITIONER

## 2022-12-08 PROCEDURE — 3066F PR DOCUMENTATION OF TREATMENT FOR NEPHROPATHY: ICD-10-PCS | Mod: CPTII,S$GLB,, | Performed by: NURSE PRACTITIONER

## 2022-12-08 PROCEDURE — 1160F RVW MEDS BY RX/DR IN RCRD: CPT | Mod: CPTII,S$GLB,, | Performed by: NURSE PRACTITIONER

## 2022-12-08 PROCEDURE — 3052F PR MOST RECENT HEMOGLOBIN A1C LEVEL 8.0 - < 9.0%: ICD-10-PCS | Mod: CPTII,S$GLB,, | Performed by: NURSE PRACTITIONER

## 2022-12-08 PROCEDURE — 3075F SYST BP GE 130 - 139MM HG: CPT | Mod: CPTII,S$GLB,, | Performed by: NURSE PRACTITIONER

## 2022-12-08 PROCEDURE — 3066F NEPHROPATHY DOC TX: CPT | Mod: CPTII,S$GLB,, | Performed by: NURSE PRACTITIONER

## 2022-12-08 PROCEDURE — 3008F PR BODY MASS INDEX (BMI) DOCUMENTED: ICD-10-PCS | Mod: CPTII,S$GLB,, | Performed by: NURSE PRACTITIONER

## 2022-12-08 PROCEDURE — 83036 POCT HEMOGLOBIN A1C: ICD-10-PCS | Mod: QW,,, | Performed by: NURSE PRACTITIONER

## 2022-12-08 PROCEDURE — 1159F PR MEDICATION LIST DOCUMENTED IN MEDICAL RECORD: ICD-10-PCS | Mod: CPTII,S$GLB,, | Performed by: NURSE PRACTITIONER

## 2022-12-08 PROCEDURE — 4010F ACE/ARB THERAPY RXD/TAKEN: CPT | Mod: CPTII,S$GLB,, | Performed by: NURSE PRACTITIONER

## 2022-12-08 PROCEDURE — 3061F PR NEG MICROALBUMINURIA RESULT DOCUMENTED/REVIEW: ICD-10-PCS | Mod: CPTII,S$GLB,, | Performed by: NURSE PRACTITIONER

## 2022-12-08 PROCEDURE — 3078F DIAST BP <80 MM HG: CPT | Mod: CPTII,S$GLB,, | Performed by: NURSE PRACTITIONER

## 2022-12-08 PROCEDURE — 83036 HEMOGLOBIN GLYCOSYLATED A1C: CPT | Mod: QW,,, | Performed by: NURSE PRACTITIONER

## 2022-12-08 PROCEDURE — 3052F HG A1C>EQUAL 8.0%<EQUAL 9.0%: CPT | Mod: CPTII,S$GLB,, | Performed by: NURSE PRACTITIONER

## 2022-12-08 PROCEDURE — 1159F MED LIST DOCD IN RCRD: CPT | Mod: CPTII,S$GLB,, | Performed by: NURSE PRACTITIONER

## 2022-12-08 RX ORDER — OMEPRAZOLE 20 MG/1
20 CAPSULE, DELAYED RELEASE ORAL DAILY
Qty: 90 CAPSULE | Refills: 1 | Status: SHIPPED | OUTPATIENT
Start: 2022-12-08 | End: 2023-06-15 | Stop reason: SDUPTHER

## 2022-12-08 RX ORDER — LISINOPRIL 20 MG/1
40 TABLET ORAL DAILY
Qty: 180 TABLET | Refills: 1 | Status: SHIPPED | OUTPATIENT
Start: 2022-12-08 | End: 2023-06-15 | Stop reason: SDUPTHER

## 2022-12-08 RX ORDER — TRAMADOL HYDROCHLORIDE 50 MG/1
50 TABLET ORAL EVERY 6 HOURS PRN
Qty: 20 TABLET | Refills: 0 | Status: SHIPPED | OUTPATIENT
Start: 2022-12-08

## 2022-12-08 RX ORDER — GABAPENTIN 100 MG/1
200 CAPSULE ORAL 2 TIMES DAILY
Qty: 90 CAPSULE | Refills: 2 | Status: SHIPPED | OUTPATIENT
Start: 2022-12-08 | End: 2023-03-29 | Stop reason: SDUPTHER

## 2022-12-08 RX ORDER — METOPROLOL SUCCINATE 50 MG/1
50 TABLET, EXTENDED RELEASE ORAL DAILY
Qty: 90 TABLET | Refills: 1 | Status: SHIPPED | OUTPATIENT
Start: 2022-12-08 | End: 2023-06-15 | Stop reason: SDUPTHER

## 2022-12-08 RX ORDER — AMLODIPINE BESYLATE 5 MG/1
5 TABLET ORAL DAILY
Qty: 90 TABLET | Refills: 1 | Status: SHIPPED | OUTPATIENT
Start: 2022-12-08 | End: 2023-06-15 | Stop reason: SDUPTHER

## 2022-12-08 RX ORDER — GLIPIZIDE 5 MG/1
TABLET ORAL
Qty: 225 TABLET | Refills: 1 | Status: SHIPPED | OUTPATIENT
Start: 2022-12-08 | End: 2023-03-08 | Stop reason: SDUPTHER

## 2022-12-08 RX ORDER — TIZANIDINE 4 MG/1
4 TABLET ORAL NIGHTLY PRN
Qty: 90 TABLET | Refills: 1 | Status: SHIPPED | OUTPATIENT
Start: 2022-12-08 | End: 2023-06-15 | Stop reason: SDUPTHER

## 2022-12-08 RX ORDER — MELOXICAM 15 MG/1
15 TABLET ORAL DAILY
Qty: 90 TABLET | Refills: 1 | Status: SHIPPED | OUTPATIENT
Start: 2022-12-08 | End: 2023-06-15 | Stop reason: SDUPTHER

## 2022-12-08 RX ORDER — METFORMIN HYDROCHLORIDE 1000 MG/1
1000 TABLET ORAL 2 TIMES DAILY WITH MEALS
Qty: 180 TABLET | Refills: 1 | Status: SHIPPED | OUTPATIENT
Start: 2022-12-08 | End: 2023-06-15 | Stop reason: SDUPTHER

## 2022-12-08 NOTE — PATIENT INSTRUCTIONS
Increase glipizide to 1.5tablets in AM and 1 tablet in PM- monitor your blood sugar twice a day and call if blood sugar is consistently >180    Take ozempic injection once a week    Increase gabapentin to 200mg twice a day      Elisabeth Alvarado MD- pain management doctor- call for appointment  [address] [place] [address]   [id number]  [place] [id number]  Phone: [phone]

## 2022-12-08 NOTE — PROGRESS NOTES
SUBJECTIVE:    Patient ID: Rhonda Mojica is a 67 y.o. female.    Chief Complaint: Diabetes (No bottles, Eye exam req 20/20, DEXA ordered, Flu thinks got last time was told only got PNA last time// SW)    Pt here for regular f/u- DM/HTN/lumbar DDD    Pt c/oing of worsening lower back pain radiating down right leg- tingling pain to both legs. C/o's of heavy feeling to right foot. Denies any falls but reports at times right leg feels weaker- has trouble picking it up at times when getting into the car. Reports taking gabapentin 100mg BID which has helped slightly. Pt reports did not go to PT- states she babysits her grandchild and doesn't have anyone else to watch the child while she attends PT    Reports because of pain hasn't been exercising, also eating more around the holidays and A1C is up to 9%. Pt admits doesn't take ozempic weekly because of anusea so takes it about every 2 weeks. Not checking sugars recently either      Office Visit on 12/08/2022   Component Date Value Ref Range Status    Hemoglobin A1C 12/08/2022 9.0  % Final   Office Visit on 08/17/2022   Component Date Value Ref Range Status    Hemoglobin A1C 08/17/2022 7.0  % Final       Past Medical History:   Diagnosis Date    DDD (degenerative disc disease), lumbar     DM type 2 with diabetic dyslipidemia     Dyslipidemia     GERD (gastroesophageal reflux disease)     Hypertension      History reviewed. No pertinent surgical history.  History reviewed. No pertinent family history.    The 10-year CVD risk score (D'Agostino, et al., 2008) is: 19.9%    Values used to calculate the score:      Age: 67 years      Sex: Female      Diabetic: Yes      Tobacco smoker: No      Systolic Blood Pressure: 136 mmHg      Is BP treated: Yes      HDL Cholesterol: 41 mg/dL      Total Cholesterol: 132 mg/dL     Marital Status:   Alcohol History:  reports no history of alcohol use.  Tobacco History:  reports that she has never smoked. She has never used smokeless  tobacco.  Drug History:  reports no history of drug use.    Health Maintenance Topics with due status: Not Due       Topic Last Completion Date    Colorectal Cancer Screening 08/12/2020    Diabetes Urine Screening 04/27/2022    Lipid Panel 04/27/2022    Foot Exam 08/17/2022    Mammogram 09/26/2022    Low Dose Statin 10/26/2022    Hemoglobin A1c 12/08/2022     Immunization History   Administered Date(s) Administered    COVID-19, MRNA, LN-S, PF (MODERNA FULL 0.5 ML DOSE) 03/30/2021, 04/27/2021    Influenza - Quadrivalent - High Dose - PF (65 years and older) 11/20/2020    Influenza - Trivalent (ADULT) 08/29/2017    Influenza Split 08/29/2017    Pneumococcal Conjugate - 20 Valent 09/08/2022    Pneumococcal Polysaccharide - 23 Valent 04/28/2016       Review of patient's allergies indicates:  No Known Allergies    Current Outpatient Medications:     alcohol swabs (ALCOHOL WIPES) PadM, Apply 1 each topically once daily., Disp: 200 each, Rfl: 1    amLODIPine (NORVASC) 5 MG tablet, Take 1 tablet (5 mg total) by mouth once daily., Disp: 90 tablet, Rfl: 1    aspirin 325 MG tablet, Take 325 mg by mouth once daily. , Disp: , Rfl:     blood sugar diagnostic Strp, To check BG 2 times daily, to use with insurance preferred meter, Disp: 180 strip, Rfl: 3    blood-glucose meter kit, To check BG 2 times daily, to use with insurance preferred meter, Disp: 1 each, Rfl: 0    calcium-vitamin D tablet 600 mg-200 units, Take 1 tablet by mouth once daily. , Disp: , Rfl:     cholecalciferol, vitamin D3, (VITAMIN D3) 2,000 unit Cap, Take 1 capsule by mouth once daily. , Disp: , Rfl:     empagliflozin (JARDIANCE) 25 mg tablet, Take 1 tablet (25 mg total) by mouth once daily., Disp: 90 tablet, Rfl: 1    gabapentin (NEURONTIN) 100 MG capsule, Take 2 capsules (200 mg total) by mouth 2 (two) times daily., Disp: 90 capsule, Rfl: 2    glipiZIDE (GLUCOTROL) 5 MG tablet, Take 1.5 tablets in AM and 1 tablet in PM, Disp: 225 tablet, Rfl: 1    lancets  Misc, To check BG 2 times daily, to use with insurance preferred meter (Patient not taking: Reported on 5/3/2022), Disp: 180 each, Rfl: 3    lisinopriL (PRINIVIL,ZESTRIL) 20 MG tablet, Take 2 tablets (40 mg total) by mouth once daily., Disp: 180 tablet, Rfl: 1    LUMIGAN 0.01 % Drop, , Disp: , Rfl:     meloxicam (MOBIC) 15 MG tablet, Take 1 tablet (15 mg total) by mouth once daily., Disp: 90 tablet, Rfl: 1    metFORMIN (GLUCOPHAGE) 1000 MG tablet, Take 1 tablet (1,000 mg total) by mouth 2 (two) times daily with meals., Disp: 180 tablet, Rfl: 1    metoprolol succinate (TOPROL-XL) 50 MG 24 hr tablet, Take 1 tablet (50 mg total) by mouth once daily., Disp: 90 tablet, Rfl: 1    omeprazole (PRILOSEC) 20 MG capsule, Take 1 capsule (20 mg total) by mouth once daily., Disp: 90 capsule, Rfl: 1    ondansetron (ZOFRAN-ODT) 4 MG TbDL, Take 1 tablet (4 mg total) by mouth every 6 (six) hours as needed (nausea/vomiting)., Disp: 12 tablet, Rfl: 0    pravastatin (PRAVACHOL) 40 MG tablet, Take 1 tablet (40 mg total) by mouth once daily. For cholesterol., Disp: 90 tablet, Rfl: 1    semaglutide (OZEMPIC) 1 mg/dose (2 mg/1.5 mL) PnIj, Inject 1 mg into the skin once a week., Disp: 3 pen, Rfl: 1    tiZANidine (ZANAFLEX) 4 MG tablet, Take 1 tablet (4 mg total) by mouth nightly as needed (back pain/muscle spasm)., Disp: 90 tablet, Rfl: 1    traMADoL (ULTRAM) 50 mg tablet, Take 1 tablet (50 mg total) by mouth every 6 (six) hours as needed for Pain., Disp: 20 tablet, Rfl: 0    Review of Systems   Constitutional:  Positive for unexpected weight change (wt up 16lbs since May). Negative for appetite change, chills and fever.   Respiratory:  Negative for cough, shortness of breath and wheezing.    Cardiovascular:  Negative for chest pain, palpitations and leg swelling.   Gastrointestinal:  Negative for abdominal pain, blood in stool, constipation, diarrhea, nausea and vomiting.   Genitourinary:  Positive for frequency and urgency. Negative for  "dysuria, flank pain and hematuria.   Musculoskeletal:  Positive for back pain (see HPI). Negative for gait problem and neck pain.   Skin:  Negative for wound.   Neurological:  Positive for weakness (right leg slightly weaker at times). Negative for dizziness, syncope, speech difficulty, numbness and headaches.   Psychiatric/Behavioral:  Negative for dysphoric mood. The patient is not nervous/anxious.         Objective:      Vitals:    12/08/22 0756   BP: 136/62   Pulse: 64   Weight: 77.1 kg (170 lb)   Height: 5' 4" (1.626 m)     Physical Exam  Vitals and nursing note reviewed.   Constitutional:       General: She is not in acute distress.     Appearance: Normal appearance. She is well-developed. She is not toxic-appearing.   HENT:      Head: Normocephalic and atraumatic.   Cardiovascular:      Rate and Rhythm: Normal rate and regular rhythm.      Pulses:           Dorsalis pedis pulses are 2+ on the right side and 2+ on the left side.      Heart sounds: Murmur (2nd ICS LSB) heard.   Systolic murmur is present with a grade of 2/6.     No friction rub. No gallop.   Pulmonary:      Effort: Pulmonary effort is normal. No respiratory distress.      Breath sounds: Normal breath sounds. No wheezing or rales.   Abdominal:      Palpations: Abdomen is soft.   Musculoskeletal:      Lumbar back: No swelling, edema, tenderness or bony tenderness. Normal range of motion. Positive right straight leg raise test. Negative left straight leg raise test.      Right lower leg: No edema.      Left lower leg: No edema.   Feet:      Right foot:      Skin integrity: No ulcer or erythema.   Skin:     General: Skin is warm and dry.      Findings: No rash.   Neurological:      General: No focal deficit present.      Mental Status: She is alert and oriented to person, place, and time.      Motor: Motor function is intact.      Gait: Gait normal.   Psychiatric:         Mood and Affect: Mood normal.         Assessment:       1. Type 2 diabetes " mellitus with hyperglycemia, without long-term current use of insulin    2. Essential hypertension    3. Lumbar radiculopathy    4. Dyslipidemia    5. Menopause    6. Special screening for osteoporosis    7. Gastroesophageal reflux disease without esophagitis           Plan:       Type 2 diabetes mellitus with hyperglycemia, without long-term current use of insulin  Comments:  uncontrolled [facility] A1C up to 9%- admits to diet/med noncompliance- recommend increasing glipizide and take ozempic weekly, diabetic diet and monitor sugars  Orders:  -     Hemoglobin A1C, POCT  -     glipiZIDE (GLUCOTROL) 5 MG tablet; Take 1.5 tablets in AM and 1 tablet in PM  Dispense: 225 tablet; Refill: 1  -     empagliflozin (JARDIANCE) 25 mg tablet; Take 1 tablet (25 mg total) by mouth once daily.  Dispense: 90 tablet; Refill: 1  -     metFORMIN (GLUCOPHAGE) 1000 MG tablet; Take 1 tablet (1,000 mg total) by mouth 2 (two) times daily with meals.  Dispense: 180 tablet; Refill: 1  -     blood sugar diagnostic Strp; To check BG 2 times daily, to use with insurance preferred meter  Dispense: 180 strip; Refill: 3  -     CBC Auto Differential; Future; Expected date: 12/08/2022  -     Comprehensive Metabolic Panel; Future; Expected date: 12/08/2022  -     Hemoglobin A1C; Future; Expected date: 12/08/2022  -     Lipid Panel; Future; Expected date: 12/08/2022  -     Microalbumin/Creatinine Ratio, Urine; Future; Expected date: 12/08/2022  -     Urinalysis, Reflex to Urine Culture Urine, Clean Catch; Future; Expected date: 12/08/2022  -     TSH w/reflex to FT4; Future; Expected date: 12/08/2022    Essential hypertension  Comments:  BP well controlled  Orders:  -     amLODIPine (NORVASC) 5 MG tablet; Take 1 tablet (5 mg total) by mouth once daily.  Dispense: 90 tablet; Refill: 1  -     lisinopriL (PRINIVIL,ZESTRIL) 20 MG tablet; Take 2 tablets (40 mg total) by mouth once daily.  Dispense: 180 tablet; Refill: 1  -     metoprolol succinate (TOPROL-XL) 50  MG 24 hr tablet; Take 1 tablet (50 mg total) by mouth once daily.  Dispense: 90 tablet; Refill: 1    Lumbar radiculopathy  Comments:  continues with radicular pain and reports some leg weakness- MRI previously ordered but not approved by insurance- will refer to pain mgmt.  Reports has new insurance in January so will reorder MRI  Orders:  -     MRI Lumbar Spine Without Contrast; Future; Expected date: 01/08/2023  -     Ambulatory referral/consult to Pain Clinic; Future; Expected date: 12/15/2022  -     meloxicam (MOBIC) 15 MG tablet; Take 1 tablet (15 mg total) by mouth once daily.  Dispense: 90 tablet; Refill: 1  -     tiZANidine (ZANAFLEX) 4 MG tablet; Take 1 tablet (4 mg total) by mouth nightly as needed (back pain/muscle spasm).  Dispense: 90 tablet; Refill: 1  -     gabapentin (NEURONTIN) 100 MG capsule; Take 2 capsules (200 mg total) by mouth 2 (two) times daily.  Dispense: 90 capsule; Refill: 2  -     traMADoL (ULTRAM) 50 mg tablet; Take 1 tablet (50 mg total) by mouth every 6 (six) hours as needed for Pain.  Dispense: 20 tablet; Refill: 0    Dyslipidemia  Comments:  controlled on last lipids, recheck before next visit    Menopause  -     DXA Bone Density Spine And Hip; Future; Expected date: 12/08/2022    Special screening for osteoporosis  -     DXA Bone Density Spine And Hip; Future; Expected date: 12/08/2022    Gastroesophageal reflux disease without esophagitis  Comments:  controlled on med  Orders:  -     omeprazole (PRILOSEC) 20 MG capsule; Take 1 capsule (20 mg total) by mouth once daily.  Dispense: 90 capsule; Refill: 1    Follow up in about 3 months (around 3/8/2023) for Diabetes, Labs before next visit.          Counseled on age and gender appropriate medical preventative services, including cancer screenings, immunizations, overall nutritional health, need for a consistent exercise regimen and an overall push towards maintaining a vigorous and active lifestyle.      12/8/2022 Priyanka Garibay,  NP

## 2022-12-15 ENCOUNTER — TELEPHONE (OUTPATIENT)
Dept: FAMILY MEDICINE | Facility: CLINIC | Age: 67
End: 2022-12-15

## 2022-12-15 NOTE — TELEPHONE ENCOUNTER
Spoke with pt in regards to recent message sent. Verbalized to the pt that a new prescription for empagliflozin was sent to her pharmacy on 12/08/2022 for a 90 day supply and 1 refill. Verbalized that she needs to call her pharmacy for the refill. Pt acknowledge understanding.

## 2022-12-15 NOTE — TELEPHONE ENCOUNTER
Pt is needing a refill on her empagliflozin. Last office visit 12/08/2022. Next office visit 03/08/2023.     New prescription for empagliflozin was sent to the pt's pharmacy on 12/08/2022 for a 90 day supply and 1 refill.

## 2022-12-15 NOTE — TELEPHONE ENCOUNTER
----- Message from Leidy Thompson sent at 12/15/2022  9:46 AM CST -----  Patient called and stated that she need a refill of her empagliflozin [facility] [address] [facility] if any questions please give her a call at [phone]

## 2023-01-12 ENCOUNTER — HOSPITAL ENCOUNTER (OUTPATIENT)
Dept: RADIOLOGY | Facility: HOSPITAL | Age: 68
Discharge: HOME | End: 2023-01-12
Attending: NURSE PRACTITIONER
Payer: MEDICARE

## 2023-01-12 DIAGNOSIS — Z13.820 SPECIAL SCREENING FOR OSTEOPOROSIS: ICD-10-CM

## 2023-01-12 DIAGNOSIS — Z78.0 MENOPAUSE: ICD-10-CM

## 2023-01-12 DIAGNOSIS — M54.16 LUMBAR RADICULOPATHY: ICD-10-CM

## 2023-01-12 PROCEDURE — 77080 DXA BONE DENSITY AXIAL: CPT | Mod: TC,PO

## 2023-01-12 RX ORDER — CLONAZEPAM 1 MG/1
TABLET ORAL
Qty: 1 TABLET | Refills: 0 | Status: SHIPPED | OUTPATIENT
Start: 2023-01-12 | End: 2023-06-15

## 2023-01-12 NOTE — TELEPHONE ENCOUNTER
----- Message from Faby Sweet sent at 1/12/2023  9:49 AM CST -----  Pt called to notify that she was unable to take MRI because her legs wouldn't go down. [phone]

## 2023-01-12 NOTE — TELEPHONE ENCOUNTER
Spoke to pt and let her know per Priyanka verbatim below. Pt verbalized understanding and agrees to try the clonazepam and is going to call to reschedule the MRI. Pt wants the medication sent to [facility] on [address].

## 2023-01-12 NOTE — TELEPHONE ENCOUNTER
Spoke to pt and let her know per Priyanka Verbatim below. Pt verbalized understanding and Rx to be sent to [id number] [facility].

## 2023-01-12 NOTE — TELEPHONE ENCOUNTER
Please let patient know I can order a clonazepam 1 mg she could take 1 hour before MRI and see if that would help with the legs- she will need to reschedule test with [facility]. If she agrees send back to me and I'll send in clonazepam

## 2023-01-12 NOTE — TELEPHONE ENCOUNTER
Please let patient know she will need someone to drive her to the MRI because the clonazepam will cause sedation. And which [facility] [address] is RX going to?

## 2023-01-12 NOTE — TELEPHONE ENCOUNTER
Spoke to pt and she stated she laid down and her legs kept coming back up then they tried weights on her legs and they still was going up into the air. Pt has left the MRI and was just informing Priyanka and steven jones as any suggestions for her

## 2023-02-07 ENCOUNTER — HOSPITAL ENCOUNTER (OUTPATIENT)
Dept: RADIOLOGY | Facility: HOSPITAL | Age: 68
Discharge: HOME | End: 2023-02-07
Attending: NURSE PRACTITIONER
Payer: MEDICARE

## 2023-02-07 DIAGNOSIS — M48.062 SPINAL STENOSIS OF LUMBAR REGION WITH NEUROGENIC CLAUDICATION: Primary | ICD-10-CM

## 2023-02-07 PROCEDURE — 72148 MRI LUMBAR SPINE W/O DYE: CPT | Mod: TC,PO

## 2023-02-08 ENCOUNTER — TELEPHONE (OUTPATIENT)
Dept: FAMILY MEDICINE | Facility: CLINIC | Age: 68
End: 2023-02-08

## 2023-02-08 NOTE — TELEPHONE ENCOUNTER
----- Message from Priyanka Garibay NP sent at 2/7/2023 10:41 PM CST -----  Please call pt and let her know lumbar MRI shows multilevel moderate to severe spinal stenosis which means the spinal cord is being compressed at several levels due to arthritis, disc bulging and bone spurs. This is what is causing her leg pain/weakness. Recommend we refer her to Dr. Cortez, pain mgmt for further evaluation.

## 2023-03-08 ENCOUNTER — OFFICE VISIT (OUTPATIENT)
Dept: FAMILY MEDICINE | Facility: CLINIC | Age: 68
End: 2023-03-08
Payer: MEDICARE

## 2023-03-08 VITALS
HEIGHT: 64 IN | BODY MASS INDEX: 29.02 KG/M2 | OXYGEN SATURATION: 97 % | WEIGHT: 170 LBS | SYSTOLIC BLOOD PRESSURE: 136 MMHG | HEART RATE: 67 BPM | DIASTOLIC BLOOD PRESSURE: 64 MMHG

## 2023-03-08 DIAGNOSIS — E78.5 DYSLIPIDEMIA: ICD-10-CM

## 2023-03-08 DIAGNOSIS — N18.31 STAGE 3A CHRONIC KIDNEY DISEASE: ICD-10-CM

## 2023-03-08 DIAGNOSIS — E11.65 TYPE 2 DIABETES MELLITUS WITH HYPERGLYCEMIA, WITHOUT LONG-TERM CURRENT USE OF INSULIN: Primary | ICD-10-CM

## 2023-03-08 DIAGNOSIS — I10 ESSENTIAL HYPERTENSION: ICD-10-CM

## 2023-03-08 DIAGNOSIS — M51.36 DDD (DEGENERATIVE DISC DISEASE), LUMBAR: ICD-10-CM

## 2023-03-08 LAB — HBA1C MFR BLD: 9.6 %

## 2023-03-08 PROCEDURE — 1101F PR PT FALLS ASSESS DOC 0-1 FALLS W/OUT INJ PAST YR: ICD-10-PCS | Mod: CPTII,S$GLB,, | Performed by: NURSE PRACTITIONER

## 2023-03-08 PROCEDURE — 1160F RVW MEDS BY RX/DR IN RCRD: CPT | Mod: CPTII,S$GLB,, | Performed by: NURSE PRACTITIONER

## 2023-03-08 PROCEDURE — 99214 OFFICE O/P EST MOD 30 MIN: CPT | Mod: S$GLB,,, | Performed by: NURSE PRACTITIONER

## 2023-03-08 PROCEDURE — 1159F PR MEDICATION LIST DOCUMENTED IN MEDICAL RECORD: ICD-10-PCS | Mod: CPTII,S$GLB,, | Performed by: NURSE PRACTITIONER

## 2023-03-08 PROCEDURE — 3078F PR MOST RECENT DIASTOLIC BLOOD PRESSURE < 80 MM HG: ICD-10-PCS | Mod: CPTII,S$GLB,, | Performed by: NURSE PRACTITIONER

## 2023-03-08 PROCEDURE — 3078F DIAST BP <80 MM HG: CPT | Mod: CPTII,S$GLB,, | Performed by: NURSE PRACTITIONER

## 2023-03-08 PROCEDURE — 1101F PT FALLS ASSESS-DOCD LE1/YR: CPT | Mod: CPTII,S$GLB,, | Performed by: NURSE PRACTITIONER

## 2023-03-08 PROCEDURE — 99214 PR OFFICE/OUTPT VISIT, EST, LEVL IV, 30-39 MIN: ICD-10-PCS | Mod: S$GLB,,, | Performed by: NURSE PRACTITIONER

## 2023-03-08 PROCEDURE — 83036 POCT HEMOGLOBIN A1C: ICD-10-PCS | Mod: QW,,, | Performed by: NURSE PRACTITIONER

## 2023-03-08 PROCEDURE — 3075F PR MOST RECENT SYSTOLIC BLOOD PRESS GE 130-139MM HG: ICD-10-PCS | Mod: CPTII,S$GLB,, | Performed by: NURSE PRACTITIONER

## 2023-03-08 PROCEDURE — 3008F PR BODY MASS INDEX (BMI) DOCUMENTED: ICD-10-PCS | Mod: CPTII,S$GLB,, | Performed by: NURSE PRACTITIONER

## 2023-03-08 PROCEDURE — 3046F PR MOST RECENT HEMOGLOBIN A1C LEVEL > 9.0%: ICD-10-PCS | Mod: CPTII,S$GLB,, | Performed by: NURSE PRACTITIONER

## 2023-03-08 PROCEDURE — 3046F HEMOGLOBIN A1C LEVEL >9.0%: CPT | Mod: CPTII,S$GLB,, | Performed by: NURSE PRACTITIONER

## 2023-03-08 PROCEDURE — 83036 HEMOGLOBIN GLYCOSYLATED A1C: CPT | Mod: QW,,, | Performed by: NURSE PRACTITIONER

## 2023-03-08 PROCEDURE — 1160F PR REVIEW ALL MEDS BY PRESCRIBER/CLIN PHARMACIST DOCUMENTED: ICD-10-PCS | Mod: CPTII,S$GLB,, | Performed by: NURSE PRACTITIONER

## 2023-03-08 PROCEDURE — 3008F BODY MASS INDEX DOCD: CPT | Mod: CPTII,S$GLB,, | Performed by: NURSE PRACTITIONER

## 2023-03-08 PROCEDURE — 3288F FALL RISK ASSESSMENT DOCD: CPT | Mod: CPTII,S$GLB,, | Performed by: NURSE PRACTITIONER

## 2023-03-08 PROCEDURE — 1159F MED LIST DOCD IN RCRD: CPT | Mod: CPTII,S$GLB,, | Performed by: NURSE PRACTITIONER

## 2023-03-08 PROCEDURE — 3075F SYST BP GE 130 - 139MM HG: CPT | Mod: CPTII,S$GLB,, | Performed by: NURSE PRACTITIONER

## 2023-03-08 PROCEDURE — 3288F PR FALLS RISK ASSESSMENT DOCUMENTED: ICD-10-PCS | Mod: CPTII,S$GLB,, | Performed by: NURSE PRACTITIONER

## 2023-03-08 RX ORDER — GLIPIZIDE 5 MG/1
10 TABLET ORAL 2 TIMES DAILY WITH MEALS
Qty: 360 TABLET | Refills: 3 | Status: SHIPPED | OUTPATIENT
Start: 2023-03-08 | End: 2024-02-01 | Stop reason: SDUPTHER

## 2023-03-08 RX ORDER — SEMAGLUTIDE 2.68 MG/ML
2 INJECTION, SOLUTION SUBCUTANEOUS
Qty: 3 PEN | Refills: 3 | Status: SHIPPED | OUTPATIENT
Start: 2023-03-08 | End: 2024-01-30 | Stop reason: ALTCHOICE

## 2023-03-08 RX ORDER — SEMAGLUTIDE 1.34 MG/ML
1 INJECTION, SOLUTION SUBCUTANEOUS
COMMUNITY
Start: 2022-10-26 | End: 2023-03-08

## 2023-03-08 NOTE — PROGRESS NOTES
SUBJECTIVE:    Patient ID: Rhonda Mojica is a 68 y.o. female.    Chief Complaint: Diabetes (No bottles// pt here for 3 month follow up// pt is getting eye exam//niesha)    Pt here for regular f/u- DM/HTN/lumbar DDD    Pt reports overall doing okay. Admits she has not been following diabetic diet. Only checks FBS occasionally and it's ranging 160-170.  Patient reports she has been compliant with her diabetic meds including weekly Ozempic    Continues with bilat leg pain. Had lumbar MRI last month which showed: Multilevel spinal stenosis, findings are most prominent at the L4-L5 level where there is severe concentric narrowing of the dural sac due to extensive annular disc bulging and facet joint arthropathy and prominent thickening of the ligamentum flavum. Moderate left and severe right foraminal narrowing is also present at this level. There is moderately severe canal stenosis at L3-L4 that is of similar etiology. Moderate stenosis is also noted at L2-L3. Pt was notified of results by phone and referred to pain mgmt Dr. Cortez though she states she wasn't aware of that and never called for appointment. Denies any falls          Office Visit on 03/08/2023   Component Date Value Ref Range Status    Hemoglobin A1C, POC 03/08/2023 9.6  % Final   Office Visit on 12/08/2022   Component Date Value Ref Range Status    Hemoglobin A1C, POC 12/08/2022 9.0  % Final       Past Medical History:   Diagnosis Date    DDD (degenerative disc disease), lumbar     DM type 2 with diabetic dyslipidemia     Dyslipidemia     GERD (gastroesophageal reflux disease)     Hypertension      History reviewed. No pertinent surgical history.  History reviewed. No pertinent family history.    The 10-year CVD risk score (D'Agostino, et al., 2008) is: 20.5%    Values used to calculate the score:      Age: 68 years      Sex: Female      Diabetic: Yes      Tobacco smoker: No      Systolic Blood Pressure: 136 mmHg      Is BP treated: Yes      HDL  Cholesterol: 41 mg/dL      Total Cholesterol: 132 mg/dL     Marital Status:   Alcohol History:  reports no history of alcohol use.  Tobacco History:  reports that she has never smoked. She has never used smokeless tobacco.  Drug History:  reports no history of drug use.    Health Maintenance Topics with due status: Not Due       Topic Last Completion Date    Colorectal Cancer Screening 08/12/2020    Lipid Panel 04/27/2022    Foot Exam 08/17/2022    Mammogram 09/26/2022    Low Dose Statin 10/26/2022    DEXA Scan 01/12/2023    Hemoglobin A1c 03/08/2023     Immunization History   Administered Date(s) Administered    COVID-19, MRNA, LN-S, PF (MODERNA FULL 0.5 ML DOSE) 03/30/2021, 04/27/2021    Influenza - Quadrivalent - High Dose - PF (65 years and older) 11/20/2020    Influenza - Trivalent (ADULT) 08/29/2017    Influenza Split 08/29/2017    Pneumococcal Conjugate - 20 Valent 09/08/2022    Pneumococcal Polysaccharide - 23 Valent 04/28/2016       Review of patient's allergies indicates:  No Known Allergies    Current Outpatient Medications:     clonazePAM (KLONOPIN) 1 MG tablet, Take 1 hour prior to MRI, Disp: 1 tablet, Rfl: 0    traMADoL (ULTRAM) 50 mg tablet, Take 1 tablet (50 mg total) by mouth every 6 (six) hours as needed for Pain., Disp: 20 tablet, Rfl: 0    alcohol swabs (ALCOHOL WIPES) PadM, Apply 1 each topically once daily., Disp: 200 each, Rfl: 1    amLODIPine (NORVASC) 5 MG tablet, Take 1 tablet (5 mg total) by mouth once daily., Disp: 90 tablet, Rfl: 1    aspirin 325 MG tablet, Take 325 mg by mouth once daily. , Disp: , Rfl:     blood sugar diagnostic Strp, To check BG 2 times daily, to use with insurance preferred meter, Disp: 180 strip, Rfl: 3    blood-glucose meter kit, To check BG 2 times daily, to use with insurance preferred meter, Disp: 1 each, Rfl: 0    calcium-vitamin D tablet 600 mg-200 units, Take 1 tablet by mouth once daily. , Disp: , Rfl:     cholecalciferol, vitamin D3, (VITAMIN D3)  2,000 unit Cap, Take 1 capsule by mouth once daily. , Disp: , Rfl:     empagliflozin (JARDIANCE) 25 mg tablet, Take 1 tablet (25 mg total) by mouth once daily., Disp: 90 tablet, Rfl: 1    gabapentin (NEURONTIN) 100 MG capsule, Take 2 capsules (200 mg total) by mouth 2 (two) times daily., Disp: 90 capsule, Rfl: 2    glipiZIDE (GLUCOTROL) 5 MG tablet, Take 2 tablets (10 mg total) by mouth 2 (two) times daily with meals., Disp: 360 tablet, Rfl: 3    lancets Misc, To check BG 2 times daily, to use with insurance preferred meter (Patient not taking: Reported on 5/3/2022), Disp: 180 each, Rfl: 3    lisinopriL (PRINIVIL,ZESTRIL) 20 MG tablet, Take 2 tablets (40 mg total) by mouth once daily., Disp: 180 tablet, Rfl: 1    LUMIGAN 0.01 % Drop, , Disp: , Rfl:     meloxicam (MOBIC) 15 MG tablet, Take 1 tablet (15 mg total) by mouth once daily., Disp: 90 tablet, Rfl: 1    metFORMIN (GLUCOPHAGE) 1000 MG tablet, Take 1 tablet (1,000 mg total) by mouth 2 (two) times daily with meals., Disp: 180 tablet, Rfl: 1    metoprolol succinate (TOPROL-XL) 50 MG 24 hr tablet, Take 1 tablet (50 mg total) by mouth once daily., Disp: 90 tablet, Rfl: 1    omeprazole (PRILOSEC) 20 MG capsule, Take 1 capsule (20 mg total) by mouth once daily., Disp: 90 capsule, Rfl: 1    ondansetron (ZOFRAN-ODT) 4 MG TbDL, Take 1 tablet (4 mg total) by mouth every 6 (six) hours as needed (nausea/vomiting)., Disp: 12 tablet, Rfl: 0    pravastatin (PRAVACHOL) 40 MG tablet, Take 1 tablet (40 mg total) by mouth once daily. For cholesterol., Disp: 90 tablet, Rfl: 1    semaglutide (OZEMPIC) 2 mg/dose (8 mg/3 mL) PnIj, Inject 2 mg into the skin every 7 days., Disp: 3 pen, Rfl: 3    tiZANidine (ZANAFLEX) 4 MG tablet, Take 1 tablet (4 mg total) by mouth nightly as needed (back pain/muscle spasm)., Disp: 90 tablet, Rfl: 1    Review of Systems   Constitutional:  Positive for unexpected weight change (wt up 12lbs since august). Negative for appetite change, chills and fever.  "  Respiratory:  Negative for cough, shortness of breath and wheezing.    Cardiovascular:  Negative for chest pain, palpitations and leg swelling.   Gastrointestinal:  Negative for abdominal pain, blood in stool, constipation, diarrhea, nausea and vomiting.   Genitourinary:  Positive for frequency and urgency. Negative for dysuria, flank pain and hematuria.   Musculoskeletal:  Positive for back pain (see HPI). Negative for gait problem and neck pain.   Skin:  Negative for wound.   Neurological:  Positive for weakness (right leg slightly weaker at times). Negative for dizziness, syncope, speech difficulty, numbness and headaches.   Psychiatric/Behavioral:  Negative for dysphoric mood. The patient is not nervous/anxious.         Objective:      Vitals:    03/08/23 1027 03/08/23 1036 03/08/23 1116   BP: (!) 160/54 (!) 142/60 136/64   Pulse: 67     SpO2: 97%     Weight: 77.1 kg (170 lb)     Height: 5' 4" (1.626 m)       Physical Exam  Vitals and nursing note reviewed.   Constitutional:       General: She is not in acute distress.     Appearance: Normal appearance. She is well-developed. She is not toxic-appearing.   HENT:      Head: Normocephalic and atraumatic.   Cardiovascular:      Rate and Rhythm: Normal rate and regular rhythm.      Pulses:           Dorsalis pedis pulses are 2+ on the right side and 2+ on the left side.      Heart sounds: Murmur (2nd ICS LSB) heard.   Systolic murmur is present with a grade of 2/6.     No friction rub. No gallop.   Pulmonary:      Effort: Pulmonary effort is normal. No respiratory distress.      Breath sounds: Normal breath sounds. No wheezing or rales.   Abdominal:      Palpations: Abdomen is soft.   Musculoskeletal:      Right lower leg: No edema.      Left lower leg: No edema.   Feet:      Right foot:      Skin integrity: No ulcer or erythema.   Skin:     General: Skin is warm and dry.      Findings: No rash.   Neurological:      General: No focal deficit present.      Mental " Status: She is alert and oriented to person, place, and time.      Motor: Motor function is intact.      Gait: Gait normal.   Psychiatric:         Mood and Affect: Mood normal.         Assessment:       1. Type 2 diabetes mellitus with hyperglycemia, without long-term current use of insulin    2. DDD (degenerative disc disease), lumbar    3. Essential hypertension    4. Dyslipidemia    5. Stage 3a chronic kidney disease           Plan:       1. Type 2 diabetes mellitus with hyperglycemia, without long-term current use of insulin  -uncontrolled with A1c up to 9.6%.  Patient cautioned on risks of uncontrolled diabetes including CVA, mi, renal failure, amputation and death.  Strongly encouraged her to get back on track with diabetic diet.  Will increase Ozempic dose to 2 mg weekly advised for now to also increase glipizide dose to 10 mg twice a day.  Cautioned on risk of hypoglycemia with glipizide and advised to reduce dose to 1 tablet in the event of hypoglycemia.  Also recommend she monitor her blood sugar at least twice a day  -     Hemoglobin A1C, POCT  -     semaglutide (OZEMPIC) 2 mg/dose (8 mg/3 mL) PnIj; Inject 2 mg into the skin every 7 days.  Dispense: 3 pen; Refill: 3  -     glipiZIDE (GLUCOTROL) 5 MG tablet; Take 2 tablets (10 mg total) by mouth 2 (two) times daily with meals.  Dispense: 360 tablet; Refill: 3    2. DDD (degenerative disc disease), lumbar   -reviewed lumbar MRI with patient, given Dr. Cortez's name and number to call to schedule appointment    3. Essential hypertension   -BP improved on recheck    4. Dyslipidemia   -patient did not have labs drawn before visit, to be drawn today    5. Stage 3a chronic kidney disease   -recheck labs today    Follow up in about 3 months (around 6/8/2023) for Diabetes, labs to be drawn today.          Counseled on age and gender appropriate medical preventative services, including cancer screenings, immunizations, overall nutritional health, need for a  consistent exercise regimen and an overall push towards maintaining a vigorous and active lifestyle.      3/8/2023 Priyanka Garibay NP

## 2023-03-08 NOTE — PATIENT INSTRUCTIONS
Scout Cortez MD- pain management- call to schedule appointment  [address] [address]  [place] [address]  Phone: [phone]    Increase ozempic to 2mg weekly injection    Increase glipizide to 2 tablets with breakfast and dinner- if you develop low blood sugar <80 overnight then reduce evening glipizide to 1 tablet    Monitor blood sugar at least twice a day- before breakfast and before evening meal

## 2023-03-10 ENCOUNTER — TELEPHONE (OUTPATIENT)
Dept: FAMILY MEDICINE | Facility: CLINIC | Age: 68
End: 2023-03-10

## 2023-03-10 LAB
ALBUMIN SERPL-MCNC: 4.3 G/DL (ref 3.6–5.1)
ALBUMIN/CREAT UR: 10 MCG/MG CREAT
ALBUMIN/GLOB SERPL: 1.4 (CALC) (ref 1–2.5)
ALP SERPL-CCNC: 114 U/L (ref 37–153)
ALT SERPL-CCNC: 14 U/L (ref 6–29)
APPEARANCE UR: CLEAR
AST SERPL-CCNC: 11 U/L (ref 10–35)
BACTERIA #/AREA URNS HPF: ABNORMAL /HPF
BACTERIA UR CULT: ABNORMAL
BACTERIA UR CULT: ABNORMAL
BASOPHILS # BLD AUTO: 81 CELLS/UL (ref 0–200)
BASOPHILS NFR BLD AUTO: 0.7 %
BILIRUB SERPL-MCNC: 0.4 MG/DL (ref 0.2–1.2)
BILIRUB UR QL STRIP: NEGATIVE
BUN SERPL-MCNC: 23 MG/DL (ref 7–25)
BUN/CREAT SERPL: 21 (CALC) (ref 6–22)
CALCIUM SERPL-MCNC: 9.5 MG/DL (ref 8.6–10.4)
CHLORIDE SERPL-SCNC: 109 MMOL/L (ref 98–110)
CHOLEST SERPL-MCNC: 136 MG/DL
CHOLEST/HDLC SERPL: 3.7 (CALC)
CO2 SERPL-SCNC: 24 MMOL/L (ref 20–32)
COLOR UR: YELLOW
CREAT SERPL-MCNC: 1.11 MG/DL (ref 0.5–1.05)
CREAT UR-MCNC: 77 MG/DL (ref 20–275)
EGFR: 54 ML/MIN/1.73M2
EOSINOPHIL # BLD AUTO: 564 CELLS/UL (ref 15–500)
EOSINOPHIL NFR BLD AUTO: 4.9 %
ERYTHROCYTE [DISTWIDTH] IN BLOOD BY AUTOMATED COUNT: 17.4 % (ref 11–15)
GLOBULIN SER CALC-MCNC: 3.1 G/DL (CALC) (ref 1.9–3.7)
GLUCOSE SERPL-MCNC: 133 MG/DL (ref 65–99)
GLUCOSE UR QL STRIP: ABNORMAL
HBA1C MFR BLD: 9.4 % OF TOTAL HGB
HCT VFR BLD AUTO: 40.7 % (ref 35–45)
HDLC SERPL-MCNC: 37 MG/DL
HGB BLD-MCNC: 12.7 G/DL (ref 11.7–15.5)
HGB UR QL STRIP: NEGATIVE
HYALINE CASTS #/AREA URNS LPF: ABNORMAL /LPF
KETONES UR QL STRIP: NEGATIVE
LDLC SERPL CALC-MCNC: 76 MG/DL (CALC)
LEUKOCYTE ESTERASE UR QL STRIP: ABNORMAL
LYMPHOCYTES # BLD AUTO: 2473 CELLS/UL (ref 850–3900)
LYMPHOCYTES NFR BLD AUTO: 21.5 %
MCH RBC QN AUTO: 21.5 PG (ref 27–33)
MCHC RBC AUTO-ENTMCNC: 31.2 G/DL (ref 32–36)
MCV RBC AUTO: 69 FL (ref 80–100)
MICROALBUMIN UR-MCNC: 0.8 MG/DL
MONOCYTES # BLD AUTO: 955 CELLS/UL (ref 200–950)
MONOCYTES NFR BLD AUTO: 8.3 %
NEUTROPHILS # BLD AUTO: 7429 CELLS/UL (ref 1500–7800)
NEUTROPHILS NFR BLD AUTO: 64.6 %
NITRITE UR QL STRIP: NEGATIVE
NONHDLC SERPL-MCNC: 99 MG/DL (CALC)
PH UR STRIP: 5.5 [PH] (ref 5–8)
PLATELET # BLD AUTO: 390 THOUSAND/UL (ref 140–400)
PMV BLD REES-ECKER: 11.4 FL (ref 7.5–12.5)
POTASSIUM SERPL-SCNC: 4.2 MMOL/L (ref 3.5–5.3)
PROT SERPL-MCNC: 7.4 G/DL (ref 6.1–8.1)
PROT UR QL STRIP: NEGATIVE
RBC # BLD AUTO: 5.9 MILLION/UL (ref 3.8–5.1)
RBC #/AREA URNS HPF: ABNORMAL /HPF
SERVICE CMNT-IMP: ABNORMAL
SERVICE CMNT-IMP: ABNORMAL
SODIUM SERPL-SCNC: 143 MMOL/L (ref 135–146)
SP GR UR STRIP: 1.03 (ref 1–1.03)
SQUAMOUS #/AREA URNS HPF: ABNORMAL /HPF
TRIGL SERPL-MCNC: 147 MG/DL
TSH SERPL-ACNC: 1.22 MIU/L (ref 0.4–4.5)
WBC # BLD AUTO: 11.5 THOUSAND/UL (ref 3.8–10.8)
WBC #/AREA URNS HPF: ABNORMAL /HPF

## 2023-03-10 NOTE — TELEPHONE ENCOUNTER
Spoke with pt in regards to urine results. Pt states that she is not having any symptoms of a UTI. Verbalized to the pt that is she is having no symptoms, then no antibiotic are needed at this time. Verbalized that if she starts with symptoms to give our office a call. Pt acknowledge understanding.

## 2023-03-13 ENCOUNTER — TELEPHONE (OUTPATIENT)
Dept: FAMILY MEDICINE | Facility: CLINIC | Age: 68
End: 2023-03-13

## 2023-03-13 NOTE — TELEPHONE ENCOUNTER
Spoke with pt in regards to recent labs results. Verbalized per Priyanka that pt's cholesterol levels are well controlled. Kidney function is slightly weakened though stable from previous labs.  Thyroid is in normal range. Pt's white blood cell count is once again just slightly elevated though it appears it has been that way in the past.  Red blood cell count is stable. Urine shows moderate amount of bacteria though given she does not have any symptoms of UTI no treatment is needed.   Pt states that she is having intermittent burning urination.    Verbalized to the pt that it is important to make sure she gets her blood sugar under better control so make sure she's made the medication changes we discussed at visit and monitoring her blood sugar. Pt acknowledge understanding.

## 2023-03-13 NOTE — TELEPHONE ENCOUNTER
----- Message from Priyanka Garibay NP sent at 3/12/2023  7:09 PM CDT -----  Please call patient and let her know recent labs show cholesterol levels are well controlled.  Kidney function is slightly weakened though stable from previous labs.  Thyroid is in normal range.  Her white blood cell count is once again just slightly elevated though it appears it has been that way in the past.  Red blood cell count is stable.  Urine shows moderate amount of bacteria though given she does not have any symptoms of UTI no treatment is needed.   It is important to make sure she gets her blood sugar under better control so make sure she's made the medication changes we discussed at visit and monitoring her blood sugar

## 2023-03-15 ENCOUNTER — TELEPHONE (OUTPATIENT)
Dept: FAMILY MEDICINE | Facility: CLINIC | Age: 68
End: 2023-03-15

## 2023-03-15 NOTE — TELEPHONE ENCOUNTER
Per Priyanka BREWER for Ozempic 1 mg/qty 84 has been approved through 12/31/23. Notified pt. Thank you

## 2023-03-15 NOTE — TELEPHONE ENCOUNTER
----- Message from Kari Conrad MA sent at 3/15/2023  8:13 AM CDT -----    ----- Message -----  From: Priyanka Garibay NP  Sent: 3/14/2023   9:37 PM CDT  To: Kari Conrad MA    Yes please request PA  ----- Message -----  From: Kari Conrad MA  Sent: 3/14/2023  10:26 AM CDT  To: Priyanka Garibay NP    Would you like to start a PA?   ----- Message -----  From: Joann Newberry MA  Sent: 3/14/2023   9:32 AM CDT  To: Priyanka Santos

## 2023-03-20 ENCOUNTER — TELEPHONE (OUTPATIENT)
Dept: FAMILY MEDICINE | Facility: CLINIC | Age: 68
End: 2023-03-20

## 2023-03-20 NOTE — TELEPHONE ENCOUNTER
----- Message from Priyanka Martinez sent at 3/20/2023 10:11 AM CDT -----  Pt wants to speak with a nurse. Pt #[phone]

## 2023-03-20 NOTE — TELEPHONE ENCOUNTER
LMOR for pt to call back    [facility]  Urology Progress Note    Devan Dominguez Patient Status:  Inpatient    3/25/1969 MRN [id number]   Location [facility] 4W/SW/SE Attending Shirlene Hong MD   Hosp Day # 2 PCP Salvador Salazar DO     Subjective:  Christopher Negrete

## 2023-03-21 ENCOUNTER — TELEPHONE (OUTPATIENT)
Dept: FAMILY MEDICINE | Facility: CLINIC | Age: 68
End: 2023-03-21

## 2023-03-29 DIAGNOSIS — M54.16 LUMBAR RADICULOPATHY: ICD-10-CM

## 2023-03-29 RX ORDER — GABAPENTIN 100 MG/1
200 CAPSULE ORAL 2 TIMES DAILY
Qty: 360 CAPSULE | Refills: 3 | Status: SHIPPED | OUTPATIENT
Start: 2023-03-29 | End: 2024-01-30

## 2023-03-29 NOTE — TELEPHONE ENCOUNTER
----- Message from Rody Rose MA sent at 3/29/2023  2:39 PM CDT -----  Regarding: refill  Pt needs gabapentin sent to [facility]. [phone]

## 2023-04-05 DIAGNOSIS — E11.65 TYPE 2 DIABETES MELLITUS WITH HYPERGLYCEMIA, WITHOUT LONG-TERM CURRENT USE OF INSULIN: ICD-10-CM

## 2023-04-05 NOTE — TELEPHONE ENCOUNTER
----- Message from Faby Sweet sent at 4/5/2023 12:05 PM CDT -----  [facility]    [facility][id number] - [place], [place] - [address]      [phone]

## 2023-04-11 ENCOUNTER — PATIENT MESSAGE (OUTPATIENT)
Dept: ADMINISTRATIVE | Facility: HOSPITAL | Age: 68
End: 2023-04-11
Payer: MEDICARE

## 2023-05-25 ENCOUNTER — TELEPHONE (OUTPATIENT)
Dept: FAMILY MEDICINE | Facility: CLINIC | Age: 68
End: 2023-05-25

## 2023-05-25 DIAGNOSIS — E78.5 DYSLIPIDEMIA: ICD-10-CM

## 2023-05-25 DIAGNOSIS — E11.65 TYPE 2 DIABETES MELLITUS WITH HYPERGLYCEMIA, WITHOUT LONG-TERM CURRENT USE OF INSULIN: Primary | ICD-10-CM

## 2023-05-25 DIAGNOSIS — Z79.899 ENCOUNTER FOR LONG-TERM (CURRENT) USE OF OTHER MEDICATIONS: ICD-10-CM

## 2023-05-25 RX ORDER — PRAVASTATIN SODIUM 40 MG/1
40 TABLET ORAL DAILY
Qty: 90 TABLET | Refills: 3 | Status: SHIPPED | OUTPATIENT
Start: 2023-05-25 | End: 2024-05-24

## 2023-05-25 NOTE — TELEPHONE ENCOUNTER
Spoke with pt in regards to pre-visit labs. Verbalized that we have placed lab order for you to have done before your upcoming appointment on 06/08/2023. Verbalized that the lab order are placed through [facility], so they can come into the office anytime, no appointment necessary. Just make sure that you are fasting for you labs. Pt acknowledge understanding.

## 2023-05-25 NOTE — TELEPHONE ENCOUNTER
Pt is needing a refill on her pravastatin. Last office visit 03/08/2023. Next office visit 06/08/2023

## 2023-06-02 LAB
ALBUMIN SERPL-MCNC: 3.9 G/DL (ref 3.6–5.1)
ALBUMIN/GLOB SERPL: 1.3 (CALC) (ref 1–2.5)
ALP SERPL-CCNC: 107 U/L (ref 37–153)
ALT SERPL-CCNC: 12 U/L (ref 6–29)
AST SERPL-CCNC: 11 U/L (ref 10–35)
BILIRUB SERPL-MCNC: 0.5 MG/DL (ref 0.2–1.2)
BUN SERPL-MCNC: 17 MG/DL (ref 7–25)
BUN/CREAT SERPL: 15 (CALC) (ref 6–22)
CALCIUM SERPL-MCNC: 9.3 MG/DL (ref 8.6–10.4)
CHLORIDE SERPL-SCNC: 105 MMOL/L (ref 98–110)
CHOLEST SERPL-MCNC: 139 MG/DL
CHOLEST/HDLC SERPL: 3.8 (CALC)
CO2 SERPL-SCNC: 26 MMOL/L (ref 20–32)
CREAT SERPL-MCNC: 1.14 MG/DL (ref 0.5–1.05)
EGFR: 52 ML/MIN/1.73M2
GLOBULIN SER CALC-MCNC: 3 G/DL (CALC) (ref 1.9–3.7)
GLUCOSE SERPL-MCNC: 117 MG/DL (ref 65–99)
HBA1C MFR BLD: 7.9 % OF TOTAL HGB
HDLC SERPL-MCNC: 37 MG/DL
LDLC SERPL CALC-MCNC: 79 MG/DL (CALC)
NONHDLC SERPL-MCNC: 102 MG/DL (CALC)
POTASSIUM SERPL-SCNC: 4.3 MMOL/L (ref 3.5–5.3)
PROT SERPL-MCNC: 6.9 G/DL (ref 6.1–8.1)
SODIUM SERPL-SCNC: 140 MMOL/L (ref 135–146)
TRIGL SERPL-MCNC: 129 MG/DL

## 2023-06-06 ENCOUNTER — TELEPHONE (OUTPATIENT)
Dept: FAMILY MEDICINE | Facility: CLINIC | Age: 68
End: 2023-06-06

## 2023-06-06 NOTE — TELEPHONE ENCOUNTER
----- Message from Rody Rose MA sent at 6/6/2023 12:58 PM CDT -----  Regarding: appt move  Pt calling to ask if her 11:40 appt on 6/8 can be moved up to an earlier time frame that same day due to having to go to surgery with her sister that morning for 10am.

## 2023-06-06 NOTE — TELEPHONE ENCOUNTER
Spoke with the pt in regards to message sent. Verbalized that we do not have any available opening earlier on 06/08/2023. Rescheduled pt's appointment to 06/15/2023 @ 12:00 pm. Pt acknowledged understanding.

## 2023-06-15 ENCOUNTER — PATIENT OUTREACH (OUTPATIENT)
Dept: ADMINISTRATIVE | Facility: HOSPITAL | Age: 68
End: 2023-06-15
Payer: MEDICARE

## 2023-06-15 ENCOUNTER — OFFICE VISIT (OUTPATIENT)
Dept: FAMILY MEDICINE | Facility: CLINIC | Age: 68
End: 2023-06-15
Payer: MEDICARE

## 2023-06-15 VITALS
HEIGHT: 64 IN | OXYGEN SATURATION: 97 % | BODY MASS INDEX: 29.47 KG/M2 | SYSTOLIC BLOOD PRESSURE: 136 MMHG | WEIGHT: 172.63 LBS | HEART RATE: 63 BPM | DIASTOLIC BLOOD PRESSURE: 80 MMHG

## 2023-06-15 DIAGNOSIS — E78.5 DYSLIPIDEMIA: ICD-10-CM

## 2023-06-15 DIAGNOSIS — M54.16 LUMBAR RADICULOPATHY: ICD-10-CM

## 2023-06-15 DIAGNOSIS — E11.69 DM TYPE 2 WITH DIABETIC DYSLIPIDEMIA: Primary | ICD-10-CM

## 2023-06-15 DIAGNOSIS — E78.5 DM TYPE 2 WITH DIABETIC DYSLIPIDEMIA: Primary | ICD-10-CM

## 2023-06-15 DIAGNOSIS — I10 ESSENTIAL HYPERTENSION: ICD-10-CM

## 2023-06-15 DIAGNOSIS — K21.9 GASTROESOPHAGEAL REFLUX DISEASE WITHOUT ESOPHAGITIS: ICD-10-CM

## 2023-06-15 PROCEDURE — 1159F MED LIST DOCD IN RCRD: CPT | Mod: CPTII,S$GLB,, | Performed by: NURSE PRACTITIONER

## 2023-06-15 PROCEDURE — 3079F DIAST BP 80-89 MM HG: CPT | Mod: CPTII,S$GLB,, | Performed by: NURSE PRACTITIONER

## 2023-06-15 PROCEDURE — 3051F PR MOST RECENT HEMOGLOBIN A1C LEVEL 7.0 - < 8.0%: ICD-10-PCS | Mod: CPTII,S$GLB,, | Performed by: NURSE PRACTITIONER

## 2023-06-15 PROCEDURE — 1160F RVW MEDS BY RX/DR IN RCRD: CPT | Mod: CPTII,S$GLB,, | Performed by: NURSE PRACTITIONER

## 2023-06-15 PROCEDURE — 3075F PR MOST RECENT SYSTOLIC BLOOD PRESS GE 130-139MM HG: ICD-10-PCS | Mod: CPTII,S$GLB,, | Performed by: NURSE PRACTITIONER

## 2023-06-15 PROCEDURE — 3066F PR DOCUMENTATION OF TREATMENT FOR NEPHROPATHY: ICD-10-PCS | Mod: CPTII,S$GLB,, | Performed by: NURSE PRACTITIONER

## 2023-06-15 PROCEDURE — 99214 OFFICE O/P EST MOD 30 MIN: CPT | Mod: S$GLB,,, | Performed by: NURSE PRACTITIONER

## 2023-06-15 PROCEDURE — 1101F PR PT FALLS ASSESS DOC 0-1 FALLS W/OUT INJ PAST YR: ICD-10-PCS | Mod: CPTII,S$GLB,, | Performed by: NURSE PRACTITIONER

## 2023-06-15 PROCEDURE — 99214 PR OFFICE/OUTPT VISIT, EST, LEVL IV, 30-39 MIN: ICD-10-PCS | Mod: S$GLB,,, | Performed by: NURSE PRACTITIONER

## 2023-06-15 PROCEDURE — 1101F PT FALLS ASSESS-DOCD LE1/YR: CPT | Mod: CPTII,S$GLB,, | Performed by: NURSE PRACTITIONER

## 2023-06-15 PROCEDURE — 4010F PR ACE/ARB THEARPY RXD/TAKEN: ICD-10-PCS | Mod: CPTII,S$GLB,, | Performed by: NURSE PRACTITIONER

## 2023-06-15 PROCEDURE — 1159F PR MEDICATION LIST DOCUMENTED IN MEDICAL RECORD: ICD-10-PCS | Mod: CPTII,S$GLB,, | Performed by: NURSE PRACTITIONER

## 2023-06-15 PROCEDURE — 3066F NEPHROPATHY DOC TX: CPT | Mod: CPTII,S$GLB,, | Performed by: NURSE PRACTITIONER

## 2023-06-15 PROCEDURE — 3008F BODY MASS INDEX DOCD: CPT | Mod: CPTII,S$GLB,, | Performed by: NURSE PRACTITIONER

## 2023-06-15 PROCEDURE — 3008F PR BODY MASS INDEX (BMI) DOCUMENTED: ICD-10-PCS | Mod: CPTII,S$GLB,, | Performed by: NURSE PRACTITIONER

## 2023-06-15 PROCEDURE — 3061F NEG MICROALBUMINURIA REV: CPT | Mod: CPTII,S$GLB,, | Performed by: NURSE PRACTITIONER

## 2023-06-15 PROCEDURE — 3288F FALL RISK ASSESSMENT DOCD: CPT | Mod: CPTII,S$GLB,, | Performed by: NURSE PRACTITIONER

## 2023-06-15 PROCEDURE — 1160F PR REVIEW ALL MEDS BY PRESCRIBER/CLIN PHARMACIST DOCUMENTED: ICD-10-PCS | Mod: CPTII,S$GLB,, | Performed by: NURSE PRACTITIONER

## 2023-06-15 PROCEDURE — 3288F PR FALLS RISK ASSESSMENT DOCUMENTED: ICD-10-PCS | Mod: CPTII,S$GLB,, | Performed by: NURSE PRACTITIONER

## 2023-06-15 PROCEDURE — 3051F HG A1C>EQUAL 7.0%<8.0%: CPT | Mod: CPTII,S$GLB,, | Performed by: NURSE PRACTITIONER

## 2023-06-15 PROCEDURE — 4010F ACE/ARB THERAPY RXD/TAKEN: CPT | Mod: CPTII,S$GLB,, | Performed by: NURSE PRACTITIONER

## 2023-06-15 PROCEDURE — 3079F PR MOST RECENT DIASTOLIC BLOOD PRESSURE 80-89 MM HG: ICD-10-PCS | Mod: CPTII,S$GLB,, | Performed by: NURSE PRACTITIONER

## 2023-06-15 PROCEDURE — 3061F PR NEG MICROALBUMINURIA RESULT DOCUMENTED/REVIEW: ICD-10-PCS | Mod: CPTII,S$GLB,, | Performed by: NURSE PRACTITIONER

## 2023-06-15 PROCEDURE — 3075F SYST BP GE 130 - 139MM HG: CPT | Mod: CPTII,S$GLB,, | Performed by: NURSE PRACTITIONER

## 2023-06-15 RX ORDER — LISINOPRIL 20 MG/1
40 TABLET ORAL DAILY
Qty: 180 TABLET | Refills: 3 | Status: SHIPPED | OUTPATIENT
Start: 2023-06-15 | End: 2024-06-14

## 2023-06-15 RX ORDER — METOPROLOL SUCCINATE 50 MG/1
50 TABLET, EXTENDED RELEASE ORAL DAILY
Qty: 90 TABLET | Refills: 3 | Status: SHIPPED | OUTPATIENT
Start: 2023-06-15

## 2023-06-15 RX ORDER — AMLODIPINE BESYLATE 5 MG/1
5 TABLET ORAL DAILY
Qty: 90 TABLET | Refills: 3 | Status: SHIPPED | OUTPATIENT
Start: 2023-06-15

## 2023-06-15 RX ORDER — METFORMIN HYDROCHLORIDE 1000 MG/1
1000 TABLET ORAL 2 TIMES DAILY WITH MEALS
Qty: 180 TABLET | Refills: 3 | Status: SHIPPED | OUTPATIENT
Start: 2023-06-15

## 2023-06-15 RX ORDER — OMEPRAZOLE 20 MG/1
20 CAPSULE, DELAYED RELEASE ORAL DAILY
Qty: 90 CAPSULE | Refills: 3 | Status: SHIPPED | OUTPATIENT
Start: 2023-06-15

## 2023-06-15 RX ORDER — TIZANIDINE 4 MG/1
4 TABLET ORAL NIGHTLY PRN
Qty: 90 TABLET | Refills: 3 | Status: SHIPPED | OUTPATIENT
Start: 2023-06-15

## 2023-06-15 RX ORDER — MELOXICAM 15 MG/1
15 TABLET ORAL DAILY
Qty: 90 TABLET | Refills: 3 | Status: SHIPPED | OUTPATIENT
Start: 2023-06-15

## 2023-06-15 NOTE — PROGRESS NOTES
Population Health Chart Review & Patient Outreach Details:     Reason for Outreach Encounter:     [x]  Non-Compliant Report   []  Payor Report ([employer], PHN, BCBS, MSSP, MCIP, UHC, etc.)   []  Pre-Visit Chart Review     Updates Requested / Reviewed:     []  Care Everywhere    []     []  External Sources ([facility], [facility], [facility], etc.)   [x]  Care Team Updated    Patient Outreach Method:    []  Telephone Outreach Completed   [] Successful   [] Left Voicemail   [] Unable to Contact (wrong number, no voicemail)  []  [employer] Portal Outreach Sent  []  Letter Outreach Mailed  [x]  Fax Sent for External Records  []  External Records Upload    Health Maintenance Topics Addressed and Outreach Outcomes / Actions Taken:        []      Breast Cancer Screening []  Mammo Scheduled      []  External Records Requested     []  Added Reminder to Complete to Upcoming Primary Care Appt Notes     []  Patient Declined     []  Patient Will Call Back to Schedule     []  Patient Will Schedule with External Provider / Order Routed if Applicable             []       Cervical Cancer Screening []  Pap Scheduled      []  External Records Requested     []  Added Reminder to Complete to Upcoming Primary Care Appt Notes     []  Patient Declined     []  Patient Will Call Back to Schedule     []  Patient Will Schedule with External Provider               []          Colorectal Cancer Screening []  Colonoscopy Case Request or Referral Placed     []  External Records Requested     []  Added Reminder to Complete to Upcoming Primary Care Appt Notes     []  Patient Declined     []  Patient Will Call Back to Schedule     []  Patient Will Schedule with External Provider     []  Fit Kit Mailed (add the SmartPhrase under additional notes)     []  Reminded Patient to Complete Home Test             [x]      Diabetic Eye Exam []  Eye Camera Scheduled or Optometry Referral Placed     [x]  External Records Requested     []  Added Reminder to Complete to  Upcoming Primary Care Appt Notes     []  Patient Declined     []  Patient Will Call Back to Schedule     []  Patient Will Schedule with External Provider             []      Blood Pressure Control []  Primary Care Follow Up Visit Scheduled     []  Remote Blood Pressure Reading Captured     []  Added Reminder to Complete to Upcoming Primary Care Appt Notes     []  Patient Declined     []  Patient Will Call Back / Patient Will Send Portal Message with Reading     []  Patient Will Call Back to Schedule Provider Visit             []       HbA1c & Other Labs []  Lab Appt Scheduled for Due Labs     []  Primary Care Follow Up Visit Scheduled      []  Reminded Patient to Complete Home Test     []  Added Reminder to Complete to Upcoming Primary Care Appt Notes     []  Patient Declined     []  Patient Will Call Back to Schedule     []  Patient Will Schedule with External Provider / Order Routed if Applicable           []    Schedule Primary Care Appt []  Primary Care Appt Scheduled     []  Patient Declined     []  Patient Will Call Back to Schedule     []  Pt Established with External Provider & Updated Care Team             []      Medication Adherence []  Primary Care Appointment Scheduled     []  Added Reminder to Upcoming Primary Care Appt Notes     []  Patient Reminded to  Prescription     []  Patient Declined, Provider Notified if Needed     []  Sent Provider Message to Review and/or Add Exclusion to Problem List             []      Osteoporosis Screening []  DXA Appointment Scheduled     []  External Records Requested     []  Added Reminder to Complete to Upcoming Primary Care Appt Notes     []  Patient Declined     []  Patient Will Call Back to Schedule     []  Patient Will Schedule with External Provider / Order Routed if Applicable     Additional Care Coordinator Notes:    Eye Exam Requested  From [facility]  Immunizations reviewed.    Further Action Needed If Patient Returns Outreach:

## 2023-06-15 NOTE — PROGRESS NOTES
SUBJECTIVE:    Patient ID: Rhonda Mojica is a 68 y.o. female.    Chief Complaint: Diabetes (No bottles//Pt is here for a check up and possible medication refills//Eye exam done with [facility] 20/20 01/2023//ASCENCION)    Pt here for regular f/u- DM/HTN/lumbar DDD    Pt reports overall she's doing okay.  Reports since last visit when A1c was high at 9.4% she has improved her diet, cut out eating a lot of fried foods and sweets.  Reports blood sugars have improved down to the 130 range    BP high today, patient admits she did not take her blood pressure medicines this morning yet    Since last visit did go see Dr. Cortez, pain mgmt for lumbar DDD- he recommended YAW however she states she cna't afford the copay so hasn't been able to schedule it. Reports bilat leg pain has improved with gabapentin, denies any falls.    Reports her son just got her a bike for exercise and she rode it for the first time yesterday              Telephone on 05/25/2023   Component Date Value Ref Range Status    Glucose 06/01/2023 117 (H)  65 - 99 mg/dL Final    BUN 06/01/2023 17  7 - 25 mg/dL Final    Creatinine 06/01/2023 1.14 (H)  0.50 - 1.05 mg/dL Final    eGFR 06/01/2023 52 (L)  > OR = 60 mL/min/1.73m2 Final    BUN/Creatinine Ratio 06/01/2023 15  6 - 22 (calc) Final    Sodium 06/01/2023 140  135 - 146 mmol/L Final    Potassium 06/01/2023 4.3  3.5 - 5.3 mmol/L Final    Chloride 06/01/2023 105  98 - 110 mmol/L Final    CO2 06/01/2023 26  20 - 32 mmol/L Final    Calcium 06/01/2023 9.3  8.6 - 10.4 mg/dL Final    Total Protein 06/01/2023 6.9  6.1 - 8.1 g/dL Final    Albumin 06/01/2023 3.9  3.6 - 5.1 g/dL Final    Globulin, Total 06/01/2023 3.0  1.9 - 3.7 g/dL (calc) Final    Albumin/Globulin Ratio 06/01/2023 1.3  1.0 - 2.5 (calc) Final    Total Bilirubin 06/01/2023 0.5  0.2 - 1.2 mg/dL Final    Alkaline Phosphatase 06/01/2023 107  37 - 153 U/L Final    AST 06/01/2023 11  10 - 35 U/L Final    ALT 06/01/2023 12  6 - 29 U/L Final    Cholesterol  06/01/2023 139  <200 mg/dL Final    HDL 06/01/2023 37 (L)  > OR = 50 mg/dL Final    Triglycerides 06/01/2023 129  <150 mg/dL Final    LDL Cholesterol 06/01/2023 79  mg/dL (calc) Final    HDL/Cholesterol Ratio 06/01/2023 3.8  <5.0 (calc) Final    Non HDL Chol. (LDL+VLDL) 06/01/2023 102  <130 mg/dL (calc) Final    Hemoglobin A1C 06/01/2023 7.9 (H)  <5.7 % of total Hgb Final   Office Visit on 03/08/2023   Component Date Value Ref Range Status    Hemoglobin A1C, POC 03/08/2023 9.6  % Final       Past Medical History:   Diagnosis Date    DDD (degenerative disc disease), lumbar     DM type 2 with diabetic dyslipidemia     Dyslipidemia     GERD (gastroesophageal reflux disease)     Hypertension      History reviewed. No pertinent surgical history.  History reviewed. No pertinent family history.    The 10-year CVD risk score (DANG'Agostino, et al., 2008) is: 23.1%    Values used to calculate the score:      Age: 68 years      Sex: Female      Diabetic: Yes      Tobacco smoker: No      Systolic Blood Pressure: 136 mmHg      Is BP treated: Yes      HDL Cholesterol: 37 mg/dL      Total Cholesterol: 139 mg/dL     Marital Status:   Alcohol History:  reports no history of alcohol use.  Tobacco History:  reports that she has never smoked. She has never been exposed to tobacco smoke. She has never used smokeless tobacco.  Drug History:  reports no history of drug use.    Health Maintenance Topics with due status: Not Due       Topic Last Completion Date    Colorectal Cancer Screening 08/12/2020    Mammogram 09/26/2022    DEXA Scan 01/12/2023    Diabetes Urine Screening 03/08/2023    Low Dose Statin 05/25/2023    Lipid Panel 06/01/2023    Hemoglobin A1c 06/01/2023    Foot Exam 06/15/2023     Immunization History   Administered Date(s) Administered    COVID-19, MRNA, LN-S, PF (MODERNA FULL 0.5 ML DOSE) 03/30/2021, 04/27/2021    Influenza - Quadrivalent - High Dose - PF (65 years and older) 11/20/2020    Influenza - Trivalent  (ADULT) 08/29/2017    Influenza Split 08/29/2017    Pneumococcal Conjugate - 20 Valent 09/08/2022    Pneumococcal Polysaccharide - 23 Valent 04/28/2016       Review of patient's allergies indicates:  No Known Allergies    Current Outpatient Medications:     alcohol swabs (ALCOHOL WIPES) PadM, Apply 1 each topically once daily., Disp: 200 each, Rfl: 1    amLODIPine (NORVASC) 5 MG tablet, Take 1 tablet (5 mg total) by mouth once daily., Disp: 90 tablet, Rfl: 3    aspirin 325 MG tablet, Take 325 mg by mouth once daily. , Disp: , Rfl:     blood sugar diagnostic Strp, To check BG 2 times daily, to use with insurance preferred meter, Disp: 180 strip, Rfl: 3    blood-glucose meter kit, To check BG 2 times daily, to use with insurance preferred meter, Disp: 1 each, Rfl: 0    calcium-vitamin D tablet 600 mg-200 units, Take 1 tablet by mouth once daily. , Disp: , Rfl:     cholecalciferol, vitamin D3, (VITAMIN D3) 2,000 unit Cap, Take 1 capsule by mouth once daily. , Disp: , Rfl:     clonazePAM (KLONOPIN) 1 MG tablet, Take 1 hour prior to MRI, Disp: 1 tablet, Rfl: 0    empagliflozin (JARDIANCE) 25 mg tablet, Take 1 tablet (25 mg total) by mouth once daily., Disp: 90 tablet, Rfl: 3    gabapentin (NEURONTIN) 100 MG capsule, Take 2 capsules (200 mg total) by mouth 2 (two) times daily., Disp: 360 capsule, Rfl: 3    glipiZIDE (GLUCOTROL) 5 MG tablet, Take 2 tablets (10 mg total) by mouth 2 (two) times daily with meals., Disp: 360 tablet, Rfl: 3    lancets Misc, To check BG 2 times daily, to use with insurance preferred meter (Patient not taking: Reported on 5/3/2022), Disp: 180 each, Rfl: 3    lisinopriL (PRINIVIL,ZESTRIL) 20 MG tablet, Take 2 tablets (40 mg total) by mouth once daily., Disp: 180 tablet, Rfl: 3    LUMIGAN 0.01 % Drop, , Disp: , Rfl:     meloxicam (MOBIC) 15 MG tablet, Take 1 tablet (15 mg total) by mouth once daily., Disp: 90 tablet, Rfl: 3    metFORMIN (GLUCOPHAGE) 1000 MG tablet, Take 1 tablet (1,000 mg total) by  mouth 2 (two) times daily with meals., Disp: 180 tablet, Rfl: 3    metoprolol succinate (TOPROL-XL) 50 MG 24 hr tablet, Take 1 tablet (50 mg total) by mouth once daily., Disp: 90 tablet, Rfl: 3    omeprazole (PRILOSEC) 20 MG capsule, Take 1 capsule (20 mg total) by mouth once daily., Disp: 90 capsule, Rfl: 3    ondansetron (ZOFRAN-ODT) 4 MG TbDL, Take 1 tablet (4 mg total) by mouth every 6 (six) hours as needed (nausea/vomiting)., Disp: 12 tablet, Rfl: 0    pravastatin (PRAVACHOL) 40 MG tablet, Take 1 tablet (40 mg total) by mouth once daily. For cholesterol., Disp: 90 tablet, Rfl: 3    semaglutide (OZEMPIC) 2 mg/dose (8 mg/3 mL) PnIj, Inject 2 mg into the skin every 7 days., Disp: 3 pen, Rfl: 3    tiZANidine (ZANAFLEX) 4 MG tablet, Take 1 tablet (4 mg total) by mouth nightly as needed (back pain/muscle spasm)., Disp: 90 tablet, Rfl: 3    traMADoL (ULTRAM) 50 mg tablet, Take 1 tablet (50 mg total) by mouth every 6 (six) hours as needed for Pain., Disp: 20 tablet, Rfl: 0    Review of Systems   Constitutional:  Positive for unexpected weight change (wt up 12lbs since august). Negative for appetite change, chills and fever.   HENT:  Negative for sore throat and trouble swallowing.    Eyes:  Negative for visual disturbance.   Respiratory:  Negative for cough, shortness of breath and wheezing.    Cardiovascular:  Positive for leg swelling (Mild ankle swelling at end of day). Negative for chest pain and palpitations.   Gastrointestinal:  Negative for abdominal pain, blood in stool, constipation, diarrhea, nausea and vomiting.   Genitourinary:  Positive for frequency and urgency. Negative for dysuria, flank pain and hematuria.   Musculoskeletal:  Positive for back pain (Bilateral leg pain/heaviness seems to have improved recently). Negative for gait problem and neck pain.   Skin:  Negative for wound.   Neurological:  Positive for weakness. Negative for dizziness, syncope, speech difficulty, numbness and headaches.  "  Psychiatric/Behavioral:  Negative for dysphoric mood. The patient is not nervous/anxious.         Objective:      Vitals:    06/15/23 1201 06/15/23 1208 06/15/23 1238   BP: (!) 160/72 (!) 154/70 136/80   Pulse: 63     SpO2: 97%     Weight: 78.3 kg (172 lb 9.6 oz)     Height: 5' 4" (1.626 m)       Physical Exam  Vitals and nursing note reviewed.   Constitutional:       General: She is not in acute distress.     Appearance: Normal appearance. She is well-developed. She is not toxic-appearing.   HENT:      Head: Normocephalic and atraumatic.   Cardiovascular:      Rate and Rhythm: Normal rate and regular rhythm.      Pulses:           Dorsalis pedis pulses are 2+ on the right side and 2+ on the left side.      Heart sounds: Murmur (2nd ICS LSB) heard.   Systolic murmur is present with a grade of 2/6.     No friction rub. No gallop.   Pulmonary:      Effort: Pulmonary effort is normal. No respiratory distress.      Breath sounds: Normal breath sounds. No wheezing or rales.   Abdominal:      Palpations: Abdomen is soft.   Musculoskeletal:      Right lower leg: No edema.      Left lower leg: No edema.      Right foot: No deformity.      Left foot: No deformity.   Feet:      Right foot:      Protective Sensation: 5 sites tested.  5 sites sensed.      Skin integrity: No ulcer, erythema or callus.      Toenail Condition: Right toenails are normal.      Left foot:      Protective Sensation: 5 sites tested.  5 sites sensed.      Skin integrity: No ulcer, erythema or callus.      Toenail Condition: Left toenails are normal.   Skin:     General: Skin is warm and dry.      Findings: No rash.   Neurological:      General: No focal deficit present.      Mental Status: She is alert and oriented to person, place, and time.      Motor: Motor function is intact.      Gait: Gait normal.   Psychiatric:         Mood and Affect: Mood normal.         Assessment:       1. DM type 2 with diabetic dyslipidemia    2. Dyslipidemia    3. " Essential hypertension    4. Lumbar radiculopathy    5. Gastroesophageal reflux disease without esophagitis           Plan:       1. DM type 2 with diabetic dyslipidemia  -improved with A1c down to 7.9% from 9.4% 3 months ago, patient encouraged to continue with her diet changes and exercise, continue current meds  -     empagliflozin (JARDIANCE) 25 mg tablet; Take 1 tablet (25 mg total) by mouth once daily.  Dispense: 90 tablet; Refill: 3  -     metFORMIN (GLUCOPHAGE) 1000 MG tablet; Take 1 tablet (1,000 mg total) by mouth 2 (two) times daily with meals.  Dispense: 180 tablet; Refill: 3  -     Foot Exam Performed    2. Dyslipidemia   -reviewed recent labs with patient, well controlled    3. Essential hypertension  -BP elevated today however has not taking morning blood pressure med yet.  Stressed importance of regular schedule with blood pressure meds  -     amLODIPine (NORVASC) 5 MG tablet; Take 1 tablet (5 mg total) by mouth once daily.  Dispense: 90 tablet; Refill: 3  -     lisinopriL (PRINIVIL,ZESTRIL) 20 MG tablet; Take 2 tablets (40 mg total) by mouth once daily.  Dispense: 180 tablet; Refill: 3  -     metoprolol succinate (TOPROL-XL) 50 MG 24 hr tablet; Take 1 tablet (50 mg total) by mouth once daily.  Dispense: 90 tablet; Refill: 3    4. Lumbar radiculopathy  -patient reports saw pain management though can not afford co-pay for injections, pain has improved with gabapentin, no neuro deficits  -     meloxicam (MOBIC) 15 MG tablet; Take 1 tablet (15 mg total) by mouth once daily.  Dispense: 90 tablet; Refill: 3  -     tiZANidine (ZANAFLEX) 4 MG tablet; Take 1 tablet (4 mg total) by mouth nightly as needed (back pain/muscle spasm).  Dispense: 90 tablet; Refill: 3    5. Gastroesophageal reflux disease without esophagitis  -stable  -     omeprazole (PRILOSEC) 20 MG capsule; Take 1 capsule (20 mg total) by mouth once daily.  Dispense: 90 capsule; Refill: 3      Follow up in about 3 months (around 9/15/2023) for  Diabetes.          Counseled on age and gender appropriate medical preventative services, including cancer screenings, immunizations, overall nutritional health, need for a consistent exercise regimen and an overall push towards maintaining a vigorous and active lifestyle.      6/15/2023 Priyanka Garibay NP

## 2023-06-15 NOTE — LETTER
AUTHORIZATION FOR RELEASE OF   CONFIDENTIAL INFORMATION    [facility] ,    We are seeing Rhonda Mojica, date of birth 1955, in the clinic at [address]. Priyanka Garibay NP is the patient's PCP. Rhonda Mojica has an outstanding lab/procedure at the time we reviewed her chart. In order to help keep her health information updated, she has authorized us to request the following medical record(s):                                               ( X )  EYE EXAM ( Most Recent )                     Please fax records to [facility], Linda T Melerine, NP, [phone]     If you have any questions, please contact     Vinayak HARMAN  Clinical Care Coordinator    [facility]  [phone] (Phone)  [phone] (Fax)             Patient Name: Rhonda Mojica  : 1955  Patient Phone #: [phone]

## 2023-06-22 ENCOUNTER — PATIENT OUTREACH (OUTPATIENT)
Dept: ADMINISTRATIVE | Facility: HOSPITAL | Age: 68
End: 2023-06-22
Payer: MEDICARE

## 2023-06-22 NOTE — PROGRESS NOTES
Population Health Chart Review & Patient Outreach Details:     Reason for Outreach Encounter:     []  Non-Compliant Report   []  Payor Report ([employer], PHN, BCBS, MSSP, MCIP, UHC, etc.)   []  Pre-Visit Chart Review     Updates Requested / Reviewed:     []  Care Everywhere    []     []  External Sources ([facility], [facility], DIS, etc.)   [x]  Care Team Updated    Patient Outreach Method:    []  Telephone Outreach Completed   [] Successful   [] Left Voicemail   [] Unable to Contact (wrong number, no voicemail)  []  [facility] Portal Outreach Sent  []  Letter Outreach Mailed  []  Fax Sent for External Records  [x]  External Records Upload    Health Maintenance Topics Addressed and Outreach Outcomes / Actions Taken:        []      Breast Cancer Screening []  Mammo Scheduled      []  External Records Requested     []  Added Reminder to Complete to Upcoming Primary Care Appt Notes     []  Patient Declined     []  Patient Will Call Back to Schedule     []  Patient Will Schedule with External Provider / Order Routed if Applicable             []       Cervical Cancer Screening []  Pap Scheduled      []  External Records Requested     []  Added Reminder to Complete to Upcoming Primary Care Appt Notes     []  Patient Declined     []  Patient Will Call Back to Schedule     []  Patient Will Schedule with External Provider               []          Colorectal Cancer Screening []  Colonoscopy Case Request or Referral Placed     []  External Records Requested     []  Added Reminder to Complete to Upcoming Primary Care Appt Notes     []  Patient Declined     []  Patient Will Call Back to Schedule     []  Patient Will Schedule with External Provider     []  Fit Kit Mailed (add the SmartPhrase under additional notes)     []  Reminded Patient to Complete Home Test             [x]      Diabetic Eye Exam []  Eye Camera Scheduled or Optometry Referral Placed     []  External Records Requested     []  Added Reminder to Complete to  Upcoming Primary Care Appt Notes     []  Patient Declined     []  Patient Will Call Back to Schedule     []  Patient Will Schedule with External Provider             []      Blood Pressure Control []  Primary Care Follow Up Visit Scheduled     []  Remote Blood Pressure Reading Captured     []  Added Reminder to Complete to Upcoming Primary Care Appt Notes     []  Patient Declined     []  Patient Will Call Back / Patient Will Send Portal Message with Reading     []  Patient Will Call Back to Schedule Provider Visit             []       HbA1c & Other Labs []  Lab Appt Scheduled for Due Labs     []  Primary Care Follow Up Visit Scheduled      []  Reminded Patient to Complete Home Test     []  Added Reminder to Complete to Upcoming Primary Care Appt Notes     []  Patient Declined     []  Patient Will Call Back to Schedule     []  Patient Will Schedule with External Provider / Order Routed if Applicable           []    Schedule Primary Care Appt []  Primary Care Appt Scheduled     []  Patient Declined     []  Patient Will Call Back to Schedule     []  Pt Established with External Provider & Updated Care Team             []      Medication Adherence []  Primary Care Appointment Scheduled     []  Added Reminder to Upcoming Primary Care Appt Notes     []  Patient Reminded to  Prescription     []  Patient Declined, Provider Notified if Needed     []  Sent Provider Message to Review and/or Add Exclusion to Problem List             []      Osteoporosis Screening []  DXA Appointment Scheduled     []  External Records Requested     []  Added Reminder to Complete to Upcoming Primary Care Appt Notes     []  Patient Declined     []  Patient Will Call Back to Schedule     []  Patient Will Schedule with External Provider / Order Routed if Applicable     Additional Care Coordinator Notes:     EYE EXAM HYLERLINKED    Further Action Needed If Patient Returns Outreach:

## 2023-07-07 ENCOUNTER — TELEPHONE (OUTPATIENT)
Dept: FAMILY MEDICINE | Facility: CLINIC | Age: 68
End: 2023-07-07

## 2023-07-07 NOTE — TELEPHONE ENCOUNTER
Spoke to pt and let her know Priyanka sent in the prescriptions on 6/15 and to call the pharmacy. Pt verbalized understanding

## 2023-07-07 NOTE — TELEPHONE ENCOUNTER
----- Message from Gilma Kearney sent at 7/7/2023  9:11 AM CDT -----  Pt needs refill on meloxicam, omeprazole, jardiance   [facility]   [phone]

## 2023-07-11 ENCOUNTER — TELEPHONE (OUTPATIENT)
Dept: FAMILY MEDICINE | Facility: CLINIC | Age: 68
End: 2023-07-11

## 2023-07-11 NOTE — TELEPHONE ENCOUNTER
----- Message from Leidy Thompson sent at 7/11/2023  8:52 AM CDT -----  FYI :patient called and wanted to get the number of Dr. Varner I gave the patient the information she requested. No patient call back is need for this message.

## 2023-09-11 ENCOUNTER — TELEPHONE (OUTPATIENT)
Dept: FAMILY MEDICINE | Facility: CLINIC | Age: 68
End: 2023-09-11

## 2023-09-11 NOTE — TELEPHONE ENCOUNTER
Spoke with pt in regards to recent message sent. Verbalized to the pt that a new prescription for her Jardiance was sent to the pharmacy on 06/15/203 for a 90 day supply with 3 refill. Stated that this should last her a whole year, and that we recommend she call her pharmacy for this refill. Pt acknowledged understanding.

## 2023-09-11 NOTE — TELEPHONE ENCOUNTER
Pt is needing a refill on her Jardiance. Last office visit 06/15/2023. Next office visit 09/28/2023.   New prescription for pt's Jardiance was sent to the pharmacy on 06/15/2023 for a 90 day supply with 3 refills.

## 2023-09-11 NOTE — TELEPHONE ENCOUNTER
----- Message from Gilma Kearney sent at 9/11/2023  2:34 PM CDT -----  Pt needs refill on jardiance   [facility]   [phone]

## 2023-09-28 ENCOUNTER — OFFICE VISIT (OUTPATIENT)
Dept: FAMILY MEDICINE | Facility: CLINIC | Age: 68
End: 2023-09-28
Payer: MEDICARE

## 2023-09-28 VITALS
DIASTOLIC BLOOD PRESSURE: 70 MMHG | BODY MASS INDEX: 28.82 KG/M2 | HEIGHT: 64 IN | OXYGEN SATURATION: 98 % | WEIGHT: 168.81 LBS | SYSTOLIC BLOOD PRESSURE: 138 MMHG | HEART RATE: 70 BPM

## 2023-09-28 DIAGNOSIS — Z12.31 ENCOUNTER FOR SCREENING MAMMOGRAM FOR BREAST CANCER: ICD-10-CM

## 2023-09-28 DIAGNOSIS — M54.16 LUMBAR RADICULOPATHY: ICD-10-CM

## 2023-09-28 DIAGNOSIS — Z12.11 SCREENING FOR COLON CANCER: ICD-10-CM

## 2023-09-28 DIAGNOSIS — I10 ESSENTIAL HYPERTENSION: ICD-10-CM

## 2023-09-28 DIAGNOSIS — R92.8 ABNORMAL MAMMOGRAM: Primary | ICD-10-CM

## 2023-09-28 DIAGNOSIS — E11.69 DM TYPE 2 WITH DIABETIC DYSLIPIDEMIA: Primary | ICD-10-CM

## 2023-09-28 DIAGNOSIS — E78.5 DM TYPE 2 WITH DIABETIC DYSLIPIDEMIA: Primary | ICD-10-CM

## 2023-09-28 DIAGNOSIS — E78.5 DYSLIPIDEMIA: ICD-10-CM

## 2023-09-28 LAB — HBA1C MFR BLD: 7.3 %

## 2023-09-28 PROCEDURE — 99214 OFFICE O/P EST MOD 30 MIN: CPT | Mod: S$GLB,,, | Performed by: NURSE PRACTITIONER

## 2023-09-28 PROCEDURE — 3075F SYST BP GE 130 - 139MM HG: CPT | Mod: CPTII,S$GLB,, | Performed by: NURSE PRACTITIONER

## 2023-09-28 PROCEDURE — 99214 PR OFFICE/OUTPT VISIT, EST, LEVL IV, 30-39 MIN: ICD-10-PCS | Mod: S$GLB,,, | Performed by: NURSE PRACTITIONER

## 2023-09-28 PROCEDURE — 4010F ACE/ARB THERAPY RXD/TAKEN: CPT | Mod: CPTII,S$GLB,, | Performed by: NURSE PRACTITIONER

## 2023-09-28 PROCEDURE — 3288F PR FALLS RISK ASSESSMENT DOCUMENTED: ICD-10-PCS | Mod: CPTII,S$GLB,, | Performed by: NURSE PRACTITIONER

## 2023-09-28 PROCEDURE — 4010F PR ACE/ARB THEARPY RXD/TAKEN: ICD-10-PCS | Mod: CPTII,S$GLB,, | Performed by: NURSE PRACTITIONER

## 2023-09-28 PROCEDURE — 3288F FALL RISK ASSESSMENT DOCD: CPT | Mod: CPTII,S$GLB,, | Performed by: NURSE PRACTITIONER

## 2023-09-28 PROCEDURE — 1160F RVW MEDS BY RX/DR IN RCRD: CPT | Mod: CPTII,S$GLB,, | Performed by: NURSE PRACTITIONER

## 2023-09-28 PROCEDURE — 3078F DIAST BP <80 MM HG: CPT | Mod: CPTII,S$GLB,, | Performed by: NURSE PRACTITIONER

## 2023-09-28 PROCEDURE — 83036 POCT HEMOGLOBIN A1C: ICD-10-PCS | Mod: QW,,, | Performed by: NURSE PRACTITIONER

## 2023-09-28 PROCEDURE — 3061F PR NEG MICROALBUMINURIA RESULT DOCUMENTED/REVIEW: ICD-10-PCS | Mod: CPTII,S$GLB,, | Performed by: NURSE PRACTITIONER

## 2023-09-28 PROCEDURE — 3066F NEPHROPATHY DOC TX: CPT | Mod: CPTII,S$GLB,, | Performed by: NURSE PRACTITIONER

## 2023-09-28 PROCEDURE — 1101F PT FALLS ASSESS-DOCD LE1/YR: CPT | Mod: CPTII,S$GLB,, | Performed by: NURSE PRACTITIONER

## 2023-09-28 PROCEDURE — 3008F PR BODY MASS INDEX (BMI) DOCUMENTED: ICD-10-PCS | Mod: CPTII,S$GLB,, | Performed by: NURSE PRACTITIONER

## 2023-09-28 PROCEDURE — 3008F BODY MASS INDEX DOCD: CPT | Mod: CPTII,S$GLB,, | Performed by: NURSE PRACTITIONER

## 2023-09-28 PROCEDURE — 3078F PR MOST RECENT DIASTOLIC BLOOD PRESSURE < 80 MM HG: ICD-10-PCS | Mod: CPTII,S$GLB,, | Performed by: NURSE PRACTITIONER

## 2023-09-28 PROCEDURE — 1101F PR PT FALLS ASSESS DOC 0-1 FALLS W/OUT INJ PAST YR: ICD-10-PCS | Mod: CPTII,S$GLB,, | Performed by: NURSE PRACTITIONER

## 2023-09-28 PROCEDURE — 3066F PR DOCUMENTATION OF TREATMENT FOR NEPHROPATHY: ICD-10-PCS | Mod: CPTII,S$GLB,, | Performed by: NURSE PRACTITIONER

## 2023-09-28 PROCEDURE — 3051F PR MOST RECENT HEMOGLOBIN A1C LEVEL 7.0 - < 8.0%: ICD-10-PCS | Mod: CPTII,S$GLB,, | Performed by: NURSE PRACTITIONER

## 2023-09-28 PROCEDURE — 3061F NEG MICROALBUMINURIA REV: CPT | Mod: CPTII,S$GLB,, | Performed by: NURSE PRACTITIONER

## 2023-09-28 PROCEDURE — 3075F PR MOST RECENT SYSTOLIC BLOOD PRESS GE 130-139MM HG: ICD-10-PCS | Mod: CPTII,S$GLB,, | Performed by: NURSE PRACTITIONER

## 2023-09-28 PROCEDURE — 1159F MED LIST DOCD IN RCRD: CPT | Mod: CPTII,S$GLB,, | Performed by: NURSE PRACTITIONER

## 2023-09-28 PROCEDURE — 1160F PR REVIEW ALL MEDS BY PRESCRIBER/CLIN PHARMACIST DOCUMENTED: ICD-10-PCS | Mod: CPTII,S$GLB,, | Performed by: NURSE PRACTITIONER

## 2023-09-28 PROCEDURE — 3051F HG A1C>EQUAL 7.0%<8.0%: CPT | Mod: CPTII,S$GLB,, | Performed by: NURSE PRACTITIONER

## 2023-09-28 PROCEDURE — 1159F PR MEDICATION LIST DOCUMENTED IN MEDICAL RECORD: ICD-10-PCS | Mod: CPTII,S$GLB,, | Performed by: NURSE PRACTITIONER

## 2023-09-28 PROCEDURE — 83036 HEMOGLOBIN GLYCOSYLATED A1C: CPT | Mod: QW,,, | Performed by: NURSE PRACTITIONER

## 2023-09-28 NOTE — PROGRESS NOTES
SUBJECTIVE:    Patient ID: Rhonda Mojica is a 68 y.o. female.    Chief Complaint: Diabetes (No bottles//Pt is here for a check up and medication refills//Ordered for mammo and Cologuard today//Pt will scheduled eye exam later//decline flu vaccine//ASCENCION )    Pt here for regular f/u- DM/HTN/lumbar DDD    Pt reports overall she's been okay. Didn't take her BP med yet today, reports was rushing to get out of the house    Reports fell about 1 week ago- was getting out of bed and her feet slipped and landed on her right side. Overall pain better, able to bear weight with full ROM right arm/leg without pain    Had regular f/u with Dr. Varner in July- no changes made, told to lose some weight    Reports never followed up with Dr. Cortez, pain mgmt of lumbar DDD- he had recommended YAW but she never had. Reports pain is stable on current meds    Reports checks sugars occasionally, -160s    Reports riding her bike occasionally        Office Visit on 09/28/2023   Component Date Value Ref Range Status    Hemoglobin A1C, POC 09/28/2023 7.3  % Final   Patient Outreach on 06/22/2023   Component Date Value Ref Range Status    Left Eye DM Retinopathy 03/31/2022 Positive   Final    Right Eye DM Retinopathy 03/31/2022 Positive   Final   Telephone on 05/25/2023   Component Date Value Ref Range Status    Glucose 06/01/2023 117 (H)  65 - 99 mg/dL Final    BUN 06/01/2023 17  7 - 25 mg/dL Final    Creatinine 06/01/2023 1.14 (H)  0.50 - 1.05 mg/dL Final    eGFR 06/01/2023 52 (L)  > OR = 60 mL/min/1.73m2 Final    BUN/Creatinine Ratio 06/01/2023 15  6 - 22 (calc) Final    Sodium 06/01/2023 140  135 - 146 mmol/L Final    Potassium 06/01/2023 4.3  3.5 - 5.3 mmol/L Final    Chloride 06/01/2023 105  98 - 110 mmol/L Final    CO2 06/01/2023 26  20 - 32 mmol/L Final    Calcium 06/01/2023 9.3  8.6 - 10.4 mg/dL Final    Total Protein 06/01/2023 6.9  6.1 - 8.1 g/dL Final    Albumin 06/01/2023 3.9  3.6 - 5.1 g/dL Final    Globulin, Total  06/01/2023 3.0  1.9 - 3.7 g/dL (calc) Final    Albumin/Globulin Ratio 06/01/2023 1.3  1.0 - 2.5 (calc) Final    Total Bilirubin 06/01/2023 0.5  0.2 - 1.2 mg/dL Final    Alkaline Phosphatase 06/01/2023 107  37 - 153 U/L Final    AST 06/01/2023 11  10 - 35 U/L Final    ALT 06/01/2023 12  6 - 29 U/L Final    Cholesterol 06/01/2023 139  <200 mg/dL Final    HDL 06/01/2023 37 (L)  > OR = 50 mg/dL Final    Triglycerides 06/01/2023 129  <150 mg/dL Final    LDL Cholesterol 06/01/2023 79  mg/dL (calc) Final    HDL/Cholesterol Ratio 06/01/2023 3.8  <5.0 (calc) Final    Non HDL Chol. (LDL+VLDL) 06/01/2023 102  <130 mg/dL (calc) Final    Hemoglobin A1C 06/01/2023 7.9 (H)  <5.7 % of total Hgb Final       Past Medical History:   Diagnosis Date    DDD (degenerative disc disease), lumbar     DM type 2 with diabetic dyslipidemia     Dyslipidemia     GERD (gastroesophageal reflux disease)     Hypertension      History reviewed. No pertinent surgical history.  History reviewed. No pertinent family history.    Tests to Keep You Healthy    Mammogram: ORDERED BUT NOT SCHEDULED  Eye Exam: DUE  Colon Cancer Screening: ORDERED  Last Blood Pressure <= 139/89 (9/28/2023): NO  Last HbA1c < 8 (09/28/2023): Yes      The 10-year CVD risk score (D'Agostino, et al., 2008) is: 23.9%    Values used to calculate the score:      Age: 68 years      Sex: Female      Diabetic: Yes      Tobacco smoker: No      Systolic Blood Pressure: 138 mmHg      Is BP treated: Yes      HDL Cholesterol: 37 mg/dL      Total Cholesterol: 139 mg/dL     Marital Status:   Alcohol History:  reports no history of alcohol use.  Tobacco History:  reports that she has never smoked. She has never been exposed to tobacco smoke. She has never used smokeless tobacco.  Drug History:  reports no history of drug use.    Health Maintenance Topics with due status: Not Due       Topic Last Completion Date    DEXA Scan 01/12/2023    Diabetes Urine Screening 03/08/2023    Low Dose  Statin 05/25/2023    Lipid Panel 06/01/2023    Foot Exam 09/28/2023    Hemoglobin A1c 09/28/2023     Immunization History   Administered Date(s) Administered    COVID-19, MRNA, LN-S, PF (MODERNA FULL 0.5 ML DOSE) 03/30/2021, 04/27/2021    Influenza - Quadrivalent - High Dose - PF (65 years and older) 11/20/2020    Influenza - Trivalent (ADULT) 08/29/2017    Influenza Split 08/29/2017    Pneumococcal Conjugate - 20 Valent 09/08/2022    Pneumococcal Polysaccharide - 23 Valent 04/28/2016       Review of patient's allergies indicates:  No Known Allergies    Current Outpatient Medications:     alcohol swabs (ALCOHOL WIPES) PadM, Apply 1 each topically once daily., Disp: 200 each, Rfl: 1    amLODIPine (NORVASC) 5 MG tablet, Take 1 tablet (5 mg total) by mouth once daily., Disp: 90 tablet, Rfl: 3    aspirin 325 MG tablet, Take 325 mg by mouth once daily. , Disp: , Rfl:     blood sugar diagnostic Strp, To check BG 2 times daily, to use with insurance preferred meter, Disp: 180 strip, Rfl: 3    blood-glucose meter kit, To check BG 2 times daily, to use with insurance preferred meter, Disp: 1 each, Rfl: 0    calcium-vitamin D tablet 600 mg-200 units, Take 1 tablet by mouth once daily. , Disp: , Rfl:     cholecalciferol, vitamin D3, (VITAMIN D3) 2,000 unit Cap, Take 1 capsule by mouth once daily. , Disp: , Rfl:     empagliflozin (JARDIANCE) 25 mg tablet, Take 1 tablet (25 mg total) by mouth once daily., Disp: 90 tablet, Rfl: 3    gabapentin (NEURONTIN) 100 MG capsule, Take 2 capsules (200 mg total) by mouth 2 (two) times daily., Disp: 360 capsule, Rfl: 3    glipiZIDE (GLUCOTROL) 5 MG tablet, Take 2 tablets (10 mg total) by mouth 2 (two) times daily with meals., Disp: 360 tablet, Rfl: 3    lancets Misc, To check BG 2 times daily, to use with insurance preferred meter (Patient not taking: Reported on 5/3/2022), Disp: 180 each, Rfl: 3    lisinopriL (PRINIVIL,ZESTRIL) 20 MG tablet, Take 2 tablets (40 mg total) by mouth once  daily., Disp: 180 tablet, Rfl: 3    LUMIGAN 0.01 % Drop, , Disp: , Rfl:     meloxicam (MOBIC) 15 MG tablet, Take 1 tablet (15 mg total) by mouth once daily., Disp: 90 tablet, Rfl: 3    metFORMIN (GLUCOPHAGE) 1000 MG tablet, Take 1 tablet (1,000 mg total) by mouth 2 (two) times daily with meals., Disp: 180 tablet, Rfl: 3    metoprolol succinate (TOPROL-XL) 50 MG 24 hr tablet, Take 1 tablet (50 mg total) by mouth once daily., Disp: 90 tablet, Rfl: 3    omeprazole (PRILOSEC) 20 MG capsule, Take 1 capsule (20 mg total) by mouth once daily., Disp: 90 capsule, Rfl: 3    ondansetron (ZOFRAN-ODT) 4 MG TbDL, Take 1 tablet (4 mg total) by mouth every 6 (six) hours as needed (nausea/vomiting)., Disp: 12 tablet, Rfl: 0    pravastatin (PRAVACHOL) 40 MG tablet, Take 1 tablet (40 mg total) by mouth once daily. For cholesterol., Disp: 90 tablet, Rfl: 3    semaglutide (OZEMPIC) 2 mg/dose (8 mg/3 mL) PnIj, Inject 2 mg into the skin every 7 days., Disp: 3 pen, Rfl: 3    tiZANidine (ZANAFLEX) 4 MG tablet, Take 1 tablet (4 mg total) by mouth nightly as needed (back pain/muscle spasm)., Disp: 90 tablet, Rfl: 3    traMADoL (ULTRAM) 50 mg tablet, Take 1 tablet (50 mg total) by mouth every 6 (six) hours as needed for Pain., Disp: 20 tablet, Rfl: 0    Review of Systems   Constitutional:  Negative for appetite change, chills, fever and unexpected weight change (wt down 4lbs since nydia visit).   HENT:  Negative for sore throat and trouble swallowing.    Eyes:  Negative for visual disturbance.   Respiratory:  Negative for cough, shortness of breath and wheezing.    Cardiovascular:  Negative for chest pain, palpitations and leg swelling.   Gastrointestinal:  Negative for abdominal pain, blood in stool, constipation, diarrhea, nausea and vomiting.   Genitourinary:  Positive for frequency. Negative for dysuria, flank pain, hematuria and urgency.   Musculoskeletal:  Positive for back pain (Bilateral leg pain/heaviness stable on gabapentin).  "Negative for gait problem and neck pain.   Skin:  Negative for wound.   Neurological:  Negative for dizziness, syncope, speech difficulty, weakness, numbness and headaches.   Psychiatric/Behavioral:  Negative for dysphoric mood. The patient is not nervous/anxious.           Objective:      Vitals:    09/28/23 0956 09/28/23 1003 09/28/23 1059   BP: (!) 144/70 (!) 148/70 138/70   Pulse: 70     SpO2: 98%     Weight: 76.6 kg (168 lb 12.8 oz)     Height: 5' 4" (1.626 m)       Physical Exam  Vitals and nursing note reviewed.   Constitutional:       General: She is not in acute distress.     Appearance: Normal appearance. She is well-developed. She is not toxic-appearing.   HENT:      Head: Normocephalic and atraumatic.   Neck:      Vascular: No carotid bruit.   Cardiovascular:      Rate and Rhythm: Normal rate and regular rhythm.      Heart sounds: Murmur (2nd ICS LSB) heard.      Systolic murmur is present with a grade of 2/6.      No friction rub. No gallop.   Pulmonary:      Effort: Pulmonary effort is normal. No respiratory distress.      Breath sounds: Normal breath sounds. No wheezing or rales.   Abdominal:      Palpations: Abdomen is soft.   Musculoskeletal:      Right shoulder: No deformity. Normal range of motion.      Right elbow: No swelling or deformity. Normal range of motion.      Cervical back: Neck supple.      Right lower leg: No edema.      Left lower leg: No edema.   Lymphadenopathy:      Cervical: No cervical adenopathy.   Skin:     General: Skin is warm and dry.      Findings: No rash.   Neurological:      General: No focal deficit present.      Mental Status: She is alert and oriented to person, place, and time.      Motor: Motor function is intact.      Gait: Gait normal.   Psychiatric:         Mood and Affect: Mood normal.           Assessment:       1. DM type 2 with diabetic dyslipidemia    2. Essential hypertension    3. Dyslipidemia    4. Lumbar radiculopathy    5. Encounter for screening " mammogram for breast cancer    6. Screening for colon cancer           Plan:       1. DM type 2 with diabetic dyslipidemia  -improved with A1c down to 7.3%, encouraged to continue with meds and work on diabetic diet as well as regular exercise  -     Hemoglobin A1C, POCT    2. Essential hypertension   -BP elevated today though did not take her morning meds, discussed importance of med compliance and taking medication on a regular schedule    3. Dyslipidemia   -well controlled on last labs    4. Lumbar radiculopathy   -stable on meds, has declined YAW with Dr. Cortez    5. Encounter for screening mammogram for breast cancer  -     Mammo Digital Screening Bilat; Future; Expected date: 09/28/2023    6. Screening for colon cancer  -discussed importance of colon cancer screening, she declines colonoscopy but agrees to another Cologuard  -     Cologuard Screening (Multitarget Stool DNA); Future; Expected date: 09/28/2023      Follow up in about 4 months (around 1/28/2024) for Diabetes, HTN.          Counseled on age and gender appropriate medical preventative services, including cancer screenings, immunizations, overall nutritional health, need for a consistent exercise regimen and an overall push towards maintaining a vigorous and active lifestyle.      9/28/2023 Priyanka Garibay NP

## 2023-10-11 DIAGNOSIS — R92.8 ABNORMAL MAMMOGRAM: Primary | ICD-10-CM

## 2023-10-27 LAB — NONINV COLON CA DNA+OCC BLD SCRN STL QL: NORMAL

## 2023-11-01 ENCOUNTER — TELEPHONE (OUTPATIENT)
Dept: FAMILY MEDICINE | Facility: CLINIC | Age: 68
End: 2023-11-01

## 2023-11-01 NOTE — TELEPHONE ENCOUNTER
----- Message from Priyanka Garibay NP sent at 10/31/2023  8:21 PM CDT -----  Please call pt and let her know cologuard sample could not be processed. The company should be contacting her to recollect another sample.

## 2023-11-01 NOTE — TELEPHONE ENCOUNTER
Spoke with pt in regards to recent Cologuard results. Verbalized per Priyanka that pt's cologuard sample could not be processed. The company should be contacting her to recollect another sample. Pt acknowledged understanding.

## 2023-11-17 LAB — NONINV COLON CA DNA+OCC BLD SCRN STL QL: NEGATIVE

## 2023-11-30 ENCOUNTER — HOSPITAL ENCOUNTER (OUTPATIENT)
Dept: RADIOLOGY | Facility: HOSPITAL | Age: 68
Discharge: HOME | End: 2023-11-30
Attending: NURSE PRACTITIONER
Payer: MEDICARE

## 2023-11-30 VITALS — WEIGHT: 168.88 LBS | BODY MASS INDEX: 28.83 KG/M2 | HEIGHT: 64 IN

## 2023-11-30 DIAGNOSIS — R92.8 ABNORMAL MAMMOGRAM: ICD-10-CM

## 2023-11-30 PROCEDURE — 76642 ULTRASOUND BREAST LIMITED: CPT | Mod: TC,PO,LT

## 2023-11-30 PROCEDURE — 77066 DX MAMMO INCL CAD BI: CPT | Mod: TC,PO

## 2023-12-01 ENCOUNTER — TELEPHONE (OUTPATIENT)
Dept: FAMILY MEDICINE | Facility: CLINIC | Age: 68
End: 2023-12-01

## 2023-12-01 NOTE — TELEPHONE ENCOUNTER
----- Message from Priyanka Garibay NP sent at 11/30/2023  5:35 PM CST -----  Please call patient and let her know mammogram and left breast ultrasound shows a stable nodule in the left breast with no change in size.  There are no new abnormalities or suspicious areas seen and no mammogram evidence of malignancy.  Repeat screening mammogram in 1 year

## 2023-12-01 NOTE — TELEPHONE ENCOUNTER
Patient verbalized understanding of Nurse Practitioner Lani's message. No further questions at this time.

## 2024-01-11 ENCOUNTER — TELEPHONE (OUTPATIENT)
Dept: FAMILY MEDICINE | Facility: CLINIC | Age: 69
End: 2024-01-11
Payer: MEDICARE

## 2024-01-11 DIAGNOSIS — E78.5 DYSLIPIDEMIA: ICD-10-CM

## 2024-01-11 DIAGNOSIS — E78.5 DM TYPE 2 WITH DIABETIC DYSLIPIDEMIA: Primary | ICD-10-CM

## 2024-01-11 DIAGNOSIS — E11.69 DM TYPE 2 WITH DIABETIC DYSLIPIDEMIA: Primary | ICD-10-CM

## 2024-01-11 DIAGNOSIS — Z79.899 ENCOUNTER FOR LONG-TERM (CURRENT) USE OF OTHER MEDICATIONS: ICD-10-CM

## 2024-01-11 NOTE — TELEPHONE ENCOUNTER
Spoke with pt in regards to pre-visit labs. Verbalized that we have placed lab order for you to have done before your upcoming appointment on 01/30/204. Verbalized that the lab order are placed through [facility], so they can come into the office anytime, no appointment necessary. Just make sure that you are fasting for you labs. Pt acknowledge understanding

## 2024-01-27 LAB
ALBUMIN SERPL-MCNC: 4.1 G/DL (ref 3.6–5.1)
ALBUMIN/GLOB SERPL: 1.6 (CALC) (ref 1–2.5)
ALP SERPL-CCNC: 104 U/L (ref 37–153)
ALT SERPL-CCNC: 14 U/L (ref 6–29)
AST SERPL-CCNC: 15 U/L (ref 10–35)
BILIRUB SERPL-MCNC: 0.5 MG/DL (ref 0.2–1.2)
BUN SERPL-MCNC: 24 MG/DL (ref 7–25)
BUN/CREAT SERPL: 22 (CALC) (ref 6–22)
CALCIUM SERPL-MCNC: 9.5 MG/DL (ref 8.6–10.4)
CHLORIDE SERPL-SCNC: 105 MMOL/L (ref 98–110)
CHOLEST SERPL-MCNC: 127 MG/DL
CHOLEST/HDLC SERPL: 3.5 (CALC)
CO2 SERPL-SCNC: 28 MMOL/L (ref 20–32)
CREAT SERPL-MCNC: 1.09 MG/DL (ref 0.5–1.05)
EGFR: 55 ML/MIN/1.73M2
GLOBULIN SER CALC-MCNC: 2.6 G/DL (CALC) (ref 1.9–3.7)
GLUCOSE SERPL-MCNC: 130 MG/DL (ref 65–99)
HBA1C MFR BLD: 8.3 % OF TOTAL HGB
HDLC SERPL-MCNC: 36 MG/DL
LDLC SERPL CALC-MCNC: 72 MG/DL (CALC)
NONHDLC SERPL-MCNC: 91 MG/DL (CALC)
POTASSIUM SERPL-SCNC: 4.5 MMOL/L (ref 3.5–5.3)
PROT SERPL-MCNC: 6.7 G/DL (ref 6.1–8.1)
SODIUM SERPL-SCNC: 141 MMOL/L (ref 135–146)
TRIGL SERPL-MCNC: 108 MG/DL

## 2024-01-30 ENCOUNTER — OFFICE VISIT (OUTPATIENT)
Dept: FAMILY MEDICINE | Facility: CLINIC | Age: 69
End: 2024-01-30
Payer: MEDICARE

## 2024-01-30 ENCOUNTER — HOSPITAL ENCOUNTER (OUTPATIENT)
Dept: RADIOLOGY | Facility: HOSPITAL | Age: 69
Discharge: HOME | End: 2024-01-30
Attending: NURSE PRACTITIONER
Payer: MEDICARE

## 2024-01-30 VITALS
DIASTOLIC BLOOD PRESSURE: 60 MMHG | WEIGHT: 162.81 LBS | OXYGEN SATURATION: 98 % | HEIGHT: 64 IN | SYSTOLIC BLOOD PRESSURE: 138 MMHG | HEART RATE: 60 BPM | BODY MASS INDEX: 27.8 KG/M2

## 2024-01-30 DIAGNOSIS — M54.16 LUMBAR RADICULOPATHY: ICD-10-CM

## 2024-01-30 DIAGNOSIS — E11.69 DM TYPE 2 WITH DIABETIC DYSLIPIDEMIA: ICD-10-CM

## 2024-01-30 DIAGNOSIS — E78.5 DM TYPE 2 WITH DIABETIC DYSLIPIDEMIA: ICD-10-CM

## 2024-01-30 DIAGNOSIS — E11.65 UNCONTROLLED TYPE 2 DIABETES MELLITUS WITH HYPERGLYCEMIA: Primary | ICD-10-CM

## 2024-01-30 DIAGNOSIS — M54.2 NECK PAIN: ICD-10-CM

## 2024-01-30 DIAGNOSIS — I10 ESSENTIAL HYPERTENSION: ICD-10-CM

## 2024-01-30 DIAGNOSIS — E78.5 DYSLIPIDEMIA: ICD-10-CM

## 2024-01-30 DIAGNOSIS — B37.31 VAGINAL CANDIDIASIS: ICD-10-CM

## 2024-01-30 PROCEDURE — 99214 OFFICE O/P EST MOD 30 MIN: CPT | Mod: S$GLB,,, | Performed by: NURSE PRACTITIONER

## 2024-01-30 PROCEDURE — 72040 X-RAY EXAM NECK SPINE 2-3 VW: CPT | Mod: TC,PO

## 2024-01-30 RX ORDER — GABAPENTIN 300 MG/1
300 CAPSULE ORAL 2 TIMES DAILY
Qty: 180 CAPSULE | Refills: 3 | Status: SHIPPED | OUTPATIENT
Start: 2024-01-30 | End: 2024-04-24 | Stop reason: SDUPTHER

## 2024-01-30 RX ORDER — FLUCONAZOLE 150 MG/1
TABLET ORAL
Qty: 2 TABLET | Refills: 0 | Status: SHIPPED | OUTPATIENT
Start: 2024-01-30 | End: 2024-04-24

## 2024-01-30 NOTE — PATIENT INSTRUCTIONS
Stop ozempic and start mounjaro 10mg injection weekly- after 4 weeks call the office to increase the dose if you are tolerating medication    Increase gabapentin to 300mg twice a day, may increase further to three times a day if needed for pain

## 2024-01-30 NOTE — PROGRESS NOTES
SUBJECTIVE:    Patient ID: Rhonda Mojica is a 68 y.o. female.    Chief Complaint: Diabetes (No bottles//Pt is here for a check up and medication refills//Eye exam done in 2023, with [facility]//Decline flu vaccine//Discuss possible yeast infection x 4 weeks//ASCENCION )    Pt here for regular f/u- DM/HTN/lumbar DDD    Pt reports overall she's been okay but reportss her sister has been in the hospital for the past month- has DM on dialysis, CHF, amputations- admits has been very stressed/worried and not eating well at all- eating a lot of potato chips, pork skins, etc    Pt reports fell out of her bed about 1 month ago. Landed on back and left shoulder. Since then has been having aching type pain to bilat upper arms, worse at night. No numbness to arms/hands and not dropping anything. Reports her chronic LBP and leg pain has been stable. Previously saw Dr. Cortez, pain mgmt who recommended injections but pt declined    follows with Dr. Varner for HTN        Telephone on 01/11/2024   Component Date Value Ref Range Status    Glucose 01/26/2024 130 (H)  65 - 99 mg/dL Final    BUN 01/26/2024 24  7 - 25 mg/dL Final    Creatinine 01/26/2024 1.09 (H)  0.50 - 1.05 mg/dL Final    eGFR 01/26/2024 55 (L)  > OR = 60 mL/min/1.73m2 Final    BUN/Creatinine Ratio 01/26/2024 22  6 - 22 (calc) Final    Sodium 01/26/2024 141  135 - 146 mmol/L Final    Potassium 01/26/2024 4.5  3.5 - 5.3 mmol/L Final    Chloride 01/26/2024 105  98 - 110 mmol/L Final    CO2 01/26/2024 28  20 - 32 mmol/L Final    Calcium 01/26/2024 9.5  8.6 - 10.4 mg/dL Final    Total Protein 01/26/2024 6.7  6.1 - 8.1 g/dL Final    Albumin 01/26/2024 4.1  3.6 - 5.1 g/dL Final    Globulin, Total 01/26/2024 2.6  1.9 - 3.7 g/dL (calc) Final    Albumin/Globulin Ratio 01/26/2024 1.6  1.0 - 2.5 (calc) Final    Total Bilirubin 01/26/2024 0.5  0.2 - 1.2 mg/dL Final    Alkaline Phosphatase 01/26/2024 104  37 - 153 U/L Final    AST 01/26/2024 15  10 - 35 U/L Final    ALT 01/26/2024  14  6 - 29 U/L Final    Hemoglobin A1C 01/26/2024 8.3 (H)  <5.7 % of total Hgb Final    Cholesterol 01/26/2024 127  <200 mg/dL Final    HDL 01/26/2024 36 (L)  > OR = 50 mg/dL Final    Triglycerides 01/26/2024 108  <150 mg/dL Final    LDL Cholesterol 01/26/2024 72  mg/dL (calc) Final    HDL/Cholesterol Ratio 01/26/2024 3.5  <5.0 (calc) Final    Non HDL Chol. (LDL+VLDL) 01/26/2024 91  <130 mg/dL (calc) Final   Office Visit on 09/28/2023   Component Date Value Ref Range Status    Hemoglobin A1C, POC 09/28/2023 7.3  % Final    Cologuard Result 10/16/2023 Sample Could Not Be Processed 7  N/A Final    Cologuard Result 11/10/2023 Negative  Negative Final       Past Medical History:   Diagnosis Date    DDD (degenerative disc disease), lumbar     DM type 2 with diabetic dyslipidemia     Dyslipidemia     GERD (gastroesophageal reflux disease)     Hypertension      History reviewed. No pertinent surgical history.  History reviewed. No pertinent family history.    Tests to Keep You Healthy    Mammogram: Met on 11/30/2023  Eye Exam: DUE  Colon Cancer Screening: Met on 11/10/2023  Last Blood Pressure <= 139/89 (1/30/2024): Yes  Last HbA1c < 8 (01/26/2024): NO      The 10-year CVD risk score (D'Agostino, et al., 2008) is: 22.1%    Values used to calculate the score:      Age: 68 years      Sex: Female      Diabetic: Yes      Tobacco smoker: No      Systolic Blood Pressure: 138 mmHg      Is BP treated: Yes      HDL Cholesterol: 36 mg/dL      Total Cholesterol: 127 mg/dL     Marital Status:   Alcohol History:  reports no history of alcohol use.  Tobacco History:  reports that she has never smoked. She has never been exposed to tobacco smoke. She has never used smokeless tobacco.  Drug History:  reports no history of drug use.    Health Maintenance Topics with due status: Not Due       Topic Last Completion Date    DEXA Scan 01/12/2023    Diabetes Urine Screening 03/08/2023    Low Dose Statin 05/25/2023    Foot Exam 09/28/2023     Colorectal Cancer Screening 11/10/2023    Mammogram 11/30/2023    Lipid Panel 01/26/2024    Hemoglobin A1c 01/26/2024     Immunization History   Administered Date(s) Administered    COVID-19, MRNA, LN-S, PF (MODERNA FULL 0.5 ML DOSE) 03/30/2021, 04/27/2021    Influenza - Quadrivalent - High Dose - PF (65 years and older) 11/20/2020    Influenza - Trivalent (ADULT) 08/29/2017    Influenza Split 08/29/2017    Pneumococcal Conjugate - 20 Valent 09/08/2022    Pneumococcal Polysaccharide - 23 Valent 04/28/2016       Review of patient's allergies indicates:  No Known Allergies    Current Outpatient Medications:     alcohol swabs (ALCOHOL WIPES) PadM, Apply 1 each topically once daily., Disp: 200 each, Rfl: 1    amLODIPine (NORVASC) 5 MG tablet, Take 1 tablet (5 mg total) by mouth once daily., Disp: 90 tablet, Rfl: 3    aspirin 325 MG tablet, Take 325 mg by mouth once daily. , Disp: , Rfl:     blood sugar diagnostic Strp, To check BG 2 times daily, to use with insurance preferred meter, Disp: 180 strip, Rfl: 3    blood-glucose meter kit, To check BG 2 times daily, to use with insurance preferred meter, Disp: 1 each, Rfl: 0    calcium-vitamin D tablet 600 mg-200 units, Take 1 tablet by mouth once daily. , Disp: , Rfl:     cholecalciferol, vitamin D3, (VITAMIN D3) 2,000 unit Cap, Take 1 capsule by mouth once daily. , Disp: , Rfl:     empagliflozin (JARDIANCE) 25 mg tablet, Take 1 tablet (25 mg total) by mouth once daily., Disp: 90 tablet, Rfl: 3    fluconazole (DIFLUCAN) 150 MG Tab, Take one dose today and repeat in 3 days for yeast infection, Disp: 2 tablet, Rfl: 0    gabapentin (NEURONTIN) 300 MG capsule, Take 1 capsule (300 mg total) by mouth 2 (two) times daily., Disp: 180 capsule, Rfl: 3    glipiZIDE (GLUCOTROL) 5 MG tablet, Take 2 tablets (10 mg total) by mouth 2 (two) times daily with meals., Disp: 360 tablet, Rfl: 3    lancets Misc, To check BG 2 times daily, to use with insurance preferred meter (Patient not  taking: Reported on 5/3/2022), Disp: 180 each, Rfl: 3    lisinopriL (PRINIVIL,ZESTRIL) 20 MG tablet, Take 2 tablets (40 mg total) by mouth once daily., Disp: 180 tablet, Rfl: 3    LUMIGAN 0.01 % Drop, , Disp: , Rfl:     meloxicam (MOBIC) 15 MG tablet, Take 1 tablet (15 mg total) by mouth once daily., Disp: 90 tablet, Rfl: 3    metFORMIN (GLUCOPHAGE) 1000 MG tablet, Take 1 tablet (1,000 mg total) by mouth 2 (two) times daily with meals., Disp: 180 tablet, Rfl: 3    metoprolol succinate (TOPROL-XL) 50 MG 24 hr tablet, Take 1 tablet (50 mg total) by mouth once daily., Disp: 90 tablet, Rfl: 3    omeprazole (PRILOSEC) 20 MG capsule, Take 1 capsule (20 mg total) by mouth once daily., Disp: 90 capsule, Rfl: 3    ondansetron (ZOFRAN-ODT) 4 MG TbDL, Take 1 tablet (4 mg total) by mouth every 6 (six) hours as needed (nausea/vomiting)., Disp: 12 tablet, Rfl: 0    pravastatin (PRAVACHOL) 40 MG tablet, Take 1 tablet (40 mg total) by mouth once daily. For cholesterol., Disp: 90 tablet, Rfl: 3    tirzepatide 10 mg/0.5 mL PnIj, Inject 10 mg into the skin every 7 days., Disp: 4 Pen, Rfl: 2    tiZANidine (ZANAFLEX) 4 MG tablet, Take 1 tablet (4 mg total) by mouth nightly as needed (back pain/muscle spasm)., Disp: 90 tablet, Rfl: 3    traMADoL (ULTRAM) 50 mg tablet, Take 1 tablet (50 mg total) by mouth every 6 (six) hours as needed for Pain., Disp: 20 tablet, Rfl: 0    Review of Systems   Constitutional:  Negative for appetite change, chills, fever and unexpected weight change (wt down 6lbs since Nov).   HENT:  Negative for sore throat and trouble swallowing.    Eyes:  Negative for visual disturbance.   Respiratory:  Negative for cough, shortness of breath and wheezing.    Cardiovascular:  Negative for chest pain, palpitations and leg swelling.   Gastrointestinal:  Negative for abdominal pain, blood in stool, constipation, diarrhea, nausea and vomiting.   Genitourinary:  Positive for frequency. Negative for dysuria, flank pain,  "hematuria and urgency.        C/o's of some vaginal itching and white discharge   Musculoskeletal:  Positive for arthralgias (bilat upper arm "achey type pain", worse at night laying in bed), back pain (Bilateral leg pain/heaviness stable on gabapentin) and neck pain. Negative for gait problem.   Skin:  Negative for wound.   Neurological:  Negative for dizziness, syncope, speech difficulty, weakness, numbness and headaches.   Psychiatric/Behavioral:  Negative for dysphoric mood. The patient is nervous/anxious.           Objective:      Vitals:    01/30/24 0951   BP: 138/60   Pulse: 60   SpO2: 98%   Weight: 73.8 kg (162 lb 12.8 oz)   Height: 5' 4" (1.626 m)     Physical Exam  Vitals and nursing note reviewed.   Constitutional:       General: She is not in acute distress.     Appearance: Normal appearance. She is well-developed. She is not toxic-appearing.   HENT:      Head: Normocephalic and atraumatic.   Neck:      Vascular: No carotid bruit.   Cardiovascular:      Rate and Rhythm: Normal rate and regular rhythm.      Heart sounds: Murmur (2nd ICS LSB) heard.      Systolic murmur is present with a grade of 2/6.      No friction rub. No gallop.   Pulmonary:      Effort: Pulmonary effort is normal. No respiratory distress.      Breath sounds: Normal breath sounds. No wheezing or rales.   Abdominal:      Palpations: Abdomen is soft.   Musculoskeletal:      Right shoulder: No deformity. Normal range of motion.      Right elbow: No swelling or deformity. Normal range of motion.      Cervical back: Neck supple. Tenderness present. No swelling, edema, erythema or bony tenderness.        Back:       Right lower leg: No edema.      Left lower leg: No edema.   Lymphadenopathy:      Cervical: No cervical adenopathy.   Skin:     General: Skin is warm and dry.      Findings: No rash.   Neurological:      General: No focal deficit present.      Mental Status: She is alert and oriented to person, place, and time.      Motor: Motor " function is intact.      Gait: Gait normal.   Psychiatric:         Mood and Affect: Mood normal.           Assessment:       1. Uncontrolled type 2 diabetes mellitus with hyperglycemia    2. Essential hypertension    3. Dyslipidemia    4. DM type 2 with diabetic dyslipidemia    5. Lumbar radiculopathy    6. Neck pain    7. Vaginal candidiasis           Plan:       1. Uncontrolled type 2 diabetes mellitus with hyperglycemia  -uncontrolled with A1C up to 8.3%- pt admits to dietary noncompliance. Stressed imp of DM control to prevent complications such as CVA, MI, RF, amputations, etc that her sister is currently suffering. Encouraged to cut out the junk food, chips, etc. Will switch ozempic to mounjaro and see if tht helps with appetite better. Cautioned if A1C doesn't start coming down would recommend basal insulin  -     tirzepatide 10 mg/0.5 mL PnIj; Inject 10 mg into the skin every 7 days.  Dispense: 4 Pen; Refill: 2    2. Essential hypertension   -BP stable    3. Dyslipidemia   -reviewed recent labs with pt, well controlled    4. DM type 2 with diabetic dyslipidemia    5. Lumbar radiculopathy  -pt reports back/leg pain stable, has seen pain mgmt but declined injections  -     gabapentin (NEURONTIN) 300 MG capsule; Take 1 capsule (300 mg total) by mouth 2 (two) times daily.  Dispense: 180 capsule; Refill: 3    6. Neck pain  -pt c/oing of neck pain and bilat upper arm pain since fall a few weeks ago. Recommend xray and advised we can increase gabapentin dose, cautioned if pain doesn't improve or worsens would recommend PT or going back to see Dr. Cortez, pain mgmt  -     X-Ray Cervical Spine AP And Lateral; Future; Expected date: 01/30/2024  -     gabapentin (NEURONTIN) 300 MG capsule; Take 1 capsule (300 mg total) by mouth 2 (two) times daily.  Dispense: 180 capsule; Refill: 3    7. Vaginal candidiasis  -will treat for yeast though stressed imp of DM control to reduce risk. Needs pelvic exam if symptoms do not  improve  -     fluconazole (DIFLUCAN) 150 MG Tab; Take one dose today and repeat in 3 days for yeast infection  Dispense: 2 tablet; Refill: 0      Follow up in about 3 months (around 4/30/2024) for Diabetes.          Counseled on age and gender appropriate medical preventative services, including cancer screenings, immunizations, overall nutritional health, need for a consistent exercise regimen and an overall push towards maintaining a vigorous and active lifestyle.      1/30/2024 Priyanka Garibay, NP

## 2024-02-01 DIAGNOSIS — E11.65 TYPE 2 DIABETES MELLITUS WITH HYPERGLYCEMIA, WITHOUT LONG-TERM CURRENT USE OF INSULIN: ICD-10-CM

## 2024-02-01 RX ORDER — GLIPIZIDE 5 MG/1
10 TABLET ORAL 2 TIMES DAILY WITH MEALS
Qty: 360 TABLET | Refills: 3 | Status: SHIPPED | OUTPATIENT
Start: 2024-02-01

## 2024-02-01 NOTE — TELEPHONE ENCOUNTER
----- Message from Joan Chen sent at 2/1/2024  4:28 PM CST -----  VM 4:20 Refill  I tried to call her back to see what refill. Her phone just rang and rang.    [phone] GH

## 2024-02-07 ENCOUNTER — TELEPHONE (OUTPATIENT)
Dept: FAMILY MEDICINE | Facility: CLINIC | Age: 69
End: 2024-02-07
Payer: MEDICARE

## 2024-02-07 NOTE — TELEPHONE ENCOUNTER
Spoke with pt in regards to message sent. Pt was wanting to know her results of her xray. Verbalized per Priyanka that pt's neck xray shows moderate arthritis changes at several levels of the cervical spine with narrowing of the disc space and bone spurs- no compression fractures seen. If pt's neck and arm pain does not improve with the increase in gabapentin. Priyanka would recommend referring to physical therapy or go back to see Dr. Cortez, pain management doctor. Pt acknowledged understanding. Pt stated that she doing better since the increase of her gabapentin.    ROBYN

## 2024-02-07 NOTE — TELEPHONE ENCOUNTER
----- Message from Gilma Kearney sent at 2/7/2024  3:12 PM CST -----  - pt would like the results of her xray   [phone]

## 2024-02-27 RX ORDER — BIMATOPROST 0.1 MG/ML
1 SOLUTION/ DROPS OPHTHALMIC NIGHTLY
Status: CANCELLED | OUTPATIENT
Start: 2024-02-27 | End: 2025-02-26

## 2024-02-27 NOTE — TELEPHONE ENCOUNTER
Spoke with pt in regards to message sent. Pt stated that she had an eye exam done a couple of month ago. Verbalized that generally not a medication, that Priyanka fills because you need to make sure your eye pressures are being monitored for glaucoma on this medication. Pt acknowledged understanding. Pt will call her eye exam for the prescription.

## 2024-02-27 NOTE — TELEPHONE ENCOUNTER
----- Message from Joan Chen sent at 2/27/2024 10:19 AM CST -----  Refill her eye drips. [facility] on [address]. Pt's # [phone] GH

## 2024-02-27 NOTE — TELEPHONE ENCOUNTER
Spoke with pt in regards to message sent. Pt is needing a refill on Lumigan eye drops. Last office visit 01/30/2024. Next office visit 04/24/2024.

## 2024-02-27 NOTE — TELEPHONE ENCOUNTER
----- Message from Priyanka Martinez sent at 2/27/2024 10:57 AM CST -----  Pt is returning the office call. Pt #[phone]

## 2024-02-27 NOTE — TELEPHONE ENCOUNTER
Please clarify with patient when her last eye exam was and with who.  This is generally not a medication I refill because she needs to make sure her eye pressures are being monitored for glaucoma on this medication

## 2024-03-28 ENCOUNTER — PATIENT MESSAGE (OUTPATIENT)
Dept: ADMINISTRATIVE | Facility: HOSPITAL | Age: 69
End: 2024-03-28
Payer: MEDICARE

## 2024-04-10 ENCOUNTER — TELEPHONE (OUTPATIENT)
Dept: FAMILY MEDICINE | Facility: CLINIC | Age: 69
End: 2024-04-10
Payer: MEDICARE

## 2024-04-10 DIAGNOSIS — I10 ESSENTIAL HYPERTENSION: ICD-10-CM

## 2024-04-10 DIAGNOSIS — Z00.00 ANNUAL PHYSICAL EXAM: ICD-10-CM

## 2024-04-10 DIAGNOSIS — E11.65 UNCONTROLLED TYPE 2 DIABETES MELLITUS WITH HYPERGLYCEMIA: Primary | ICD-10-CM

## 2024-04-10 DIAGNOSIS — E78.5 DYSLIPIDEMIA: ICD-10-CM

## 2024-04-19 ENCOUNTER — TELEPHONE (OUTPATIENT)
Dept: FAMILY MEDICINE | Facility: CLINIC | Age: 69
End: 2024-04-19
Payer: MEDICARE

## 2024-04-19 NOTE — TELEPHONE ENCOUNTER
----- Message from Joan Chen sent at 4/19/2024  8:55 AM CDT -----  The patient has an appointment on Wednesday to see Priyanka. Does she need labs? Pt's # [phone]

## 2024-04-19 NOTE — TELEPHONE ENCOUNTER
Spoke with patient and let her know the lab orders are in to be done any time as long as she is fasting.

## 2024-04-24 ENCOUNTER — OFFICE VISIT (OUTPATIENT)
Dept: FAMILY MEDICINE | Facility: CLINIC | Age: 69
End: 2024-04-24
Payer: MEDICARE

## 2024-04-24 VITALS
HEART RATE: 65 BPM | SYSTOLIC BLOOD PRESSURE: 138 MMHG | HEIGHT: 64 IN | OXYGEN SATURATION: 98 % | WEIGHT: 159 LBS | BODY MASS INDEX: 27.14 KG/M2 | DIASTOLIC BLOOD PRESSURE: 62 MMHG

## 2024-04-24 DIAGNOSIS — E78.5 DYSLIPIDEMIA: ICD-10-CM

## 2024-04-24 DIAGNOSIS — E78.5 DM TYPE 2 WITH DIABETIC DYSLIPIDEMIA: Primary | ICD-10-CM

## 2024-04-24 DIAGNOSIS — M54.16 LUMBAR RADICULOPATHY: ICD-10-CM

## 2024-04-24 DIAGNOSIS — I10 ESSENTIAL HYPERTENSION: ICD-10-CM

## 2024-04-24 DIAGNOSIS — N18.31 STAGE 3A CHRONIC KIDNEY DISEASE: ICD-10-CM

## 2024-04-24 DIAGNOSIS — E11.69 DM TYPE 2 WITH DIABETIC DYSLIPIDEMIA: Primary | ICD-10-CM

## 2024-04-24 PROCEDURE — 3075F SYST BP GE 130 - 139MM HG: CPT | Mod: CPTII,S$GLB,, | Performed by: NURSE PRACTITIONER

## 2024-04-24 PROCEDURE — 1101F PT FALLS ASSESS-DOCD LE1/YR: CPT | Mod: CPTII,S$GLB,, | Performed by: NURSE PRACTITIONER

## 2024-04-24 PROCEDURE — 3044F HG A1C LEVEL LT 7.0%: CPT | Mod: CPTII,S$GLB,, | Performed by: NURSE PRACTITIONER

## 2024-04-24 PROCEDURE — 3008F BODY MASS INDEX DOCD: CPT | Mod: CPTII,S$GLB,, | Performed by: NURSE PRACTITIONER

## 2024-04-24 PROCEDURE — 3066F NEPHROPATHY DOC TX: CPT | Mod: CPTII,S$GLB,, | Performed by: NURSE PRACTITIONER

## 2024-04-24 PROCEDURE — 4010F ACE/ARB THERAPY RXD/TAKEN: CPT | Mod: CPTII,S$GLB,, | Performed by: NURSE PRACTITIONER

## 2024-04-24 PROCEDURE — 3061F NEG MICROALBUMINURIA REV: CPT | Mod: CPTII,S$GLB,, | Performed by: NURSE PRACTITIONER

## 2024-04-24 PROCEDURE — 1159F MED LIST DOCD IN RCRD: CPT | Mod: CPTII,S$GLB,, | Performed by: NURSE PRACTITIONER

## 2024-04-24 PROCEDURE — 3078F DIAST BP <80 MM HG: CPT | Mod: CPTII,S$GLB,, | Performed by: NURSE PRACTITIONER

## 2024-04-24 PROCEDURE — 3288F FALL RISK ASSESSMENT DOCD: CPT | Mod: CPTII,S$GLB,, | Performed by: NURSE PRACTITIONER

## 2024-04-24 PROCEDURE — 1126F AMNT PAIN NOTED NONE PRSNT: CPT | Mod: CPTII,S$GLB,, | Performed by: NURSE PRACTITIONER

## 2024-04-24 PROCEDURE — 99214 OFFICE O/P EST MOD 30 MIN: CPT | Mod: S$GLB,,, | Performed by: NURSE PRACTITIONER

## 2024-04-24 PROCEDURE — 1160F RVW MEDS BY RX/DR IN RCRD: CPT | Mod: CPTII,S$GLB,, | Performed by: NURSE PRACTITIONER

## 2024-04-24 RX ORDER — TIRZEPATIDE 5 MG/.5ML
5 INJECTION, SOLUTION SUBCUTANEOUS
Qty: 4 PEN | Refills: 5 | Status: SHIPPED | OUTPATIENT
Start: 2024-04-24

## 2024-04-24 RX ORDER — GABAPENTIN 300 MG/1
CAPSULE ORAL
Qty: 270 CAPSULE | Refills: 3 | Status: SHIPPED | OUTPATIENT
Start: 2024-04-24

## 2024-04-24 NOTE — PATIENT INSTRUCTIONS
Call Dr. Cortez, pain management at [phone] to schedule appointment    Increase gabapentin to 2 capsules at bedtime    Restart mounjaro 5mg weekly injection for diabetes

## 2024-04-24 NOTE — PROGRESS NOTES
SUBJECTIVE:    Patient ID: Rhonda Mojica is a 69 y.o. female.    Chief Complaint: Follow-up (No bottles//Pt is here for check up//Discus tingling sensation in feet on going for a while, and pain/burning sensation in her arms //ASCENCION )    Pt here for regular f/u- DM/HTN/lumbar DDD    Pt reports overall doing okay. Was switched Ozempic to mounjaro at last visit in January- reports took Mounjaro 10mg for a 1 month and did well with it but never got it refilled. Reports mounjaro did reduce her appetite and she wasn't snacking as much. Wt is down 9lbs since Nov    Continues to c/o of right leg neuropathy and radicular pain- right leg feels heavy, [place] at night. Was referred to Dr. Cortez, pain mgmt last year after lumbar MRI showed at the L4-L5 level severe concentric narrowing of the dural sac due to extensive annular disc bulging and facet joint arthropathy and prominent thickening of the ligamentum flavum. Moderate left and severe right foraminal narrowing is also present at this level. There is moderately severe canal stenosis at L3-L4 that is of similar etiology. Moderate stenosis is also noted at L2-L3  Pt reports she went and saw dr. Cortez once and they discussed injection but she never followed up with him. Currently taking gabapentin 300mg BID. Also c/o's of occasional burning/tingling to her arms.  Had cervical x-ray after last visit which showed multilevel degenerative changes of cervical spine as well.  No hand/arm weakness    follows with Dr. Varner for HTN        Telephone on 04/10/2024   Component Date Value Ref Range Status    Hemoglobin A1C 04/23/2024 6.9 (H)  <5.7 % of total Hgb Final    Cholesterol 04/23/2024 145  <200 mg/dL Final    HDL 04/23/2024 41 (L)  > OR = 50 mg/dL Final    Triglycerides 04/23/2024 93  <150 mg/dL Final    LDL Cholesterol 04/23/2024 85  mg/dL (calc) Final    HDL/Cholesterol Ratio 04/23/2024 3.5  <5.0 (calc) Final    Non HDL Chol. (LDL+VLDL) 04/23/2024 104  <130 mg/dL (calc) Final     Creatinine, Urine 04/23/2024 80  20 - 275 mg/dL Final    Microalb, Ur 04/23/2024 1.1  See Note: mg/dL Final    Microalb/Creat Ratio 04/23/2024 14  <30 mg/g creat Final    Color, UA 04/23/2024 YELLOW  YELLOW Final    Appearance, UA 04/23/2024 CLEAR  CLEAR Final    Specific Gravity, UA 04/23/2024 1.025  1.001 - 1.035 Final    pH, UA 04/23/2024 5.5  5.0 - 8.0 Final    Glucose, UA 04/23/2024 3+ (A)  NEGATIVE Final    Bilirubin, UA 04/23/2024 NEGATIVE  NEGATIVE Final    Ketones, UA 04/23/2024 NEGATIVE  NEGATIVE Final    Occult Blood UA 04/23/2024 NEGATIVE  NEGATIVE Final    Protein, UA 04/23/2024 NEGATIVE  NEGATIVE Final    Nitrite, UA 04/23/2024 NEGATIVE  NEGATIVE Final    Leukocytes, UA 04/23/2024 2+ (A)  NEGATIVE Final    WBC Casts, UA 04/23/2024 40-60 (A)  < OR = 5 /HPF Final    RBC Casts, UA 04/23/2024 NONE SEEN  < OR = 2 /HPF Final    Squam Epithel, UA 04/23/2024 6-10 (A)  < OR = 5 /HPF Final    Bacteria, UA 04/23/2024 FEW (A)  NONE SEEN /HPF Final    Hyaline Casts, UA 04/23/2024 NONE SEEN  NONE SEEN /LPF Final    Service Cmt: 04/23/2024    Final    Reflexive Urine Culture 04/23/2024    Final    Glucose 04/23/2024 98  65 - 99 mg/dL Final    BUN 04/23/2024 22  7 - 25 mg/dL Final    Creatinine 04/23/2024 0.98  0.60 - 1.00 mg/dL Final    eGFR 04/23/2024 62  > OR = 60 mL/min/1.73m2 Final    BUN/Creatinine Ratio 04/23/2024 SEE NOTE:  6 - 22 (calc) Final    Sodium 04/23/2024 144  135 - 146 mmol/L Final    Potassium 04/23/2024 4.3  3.5 - 5.3 mmol/L Final    Chloride 04/23/2024 108  98 - 110 mmol/L Final    CO2 04/23/2024 24  20 - 32 mmol/L Final    Calcium 04/23/2024 9.4  8.6 - 10.4 mg/dL Final    Total Protein 04/23/2024 6.6  6.1 - 8.1 g/dL Final    Albumin 04/23/2024 4.2  3.6 - 5.1 g/dL Final    Globulin, Total 04/23/2024 2.4  1.9 - 3.7 g/dL (calc) Final    Albumin/Globulin Ratio 04/23/2024 1.8  1.0 - 2.5 (calc) Final    Total Bilirubin 04/23/2024 0.3  0.2 - 1.2 mg/dL Final    Alkaline Phosphatase 04/23/2024 90  37 -  153 U/L Final    AST 04/23/2024 13  10 - 35 U/L Final    ALT 04/23/2024 10  6 - 29 U/L Final    WBC 04/23/2024 10.3  3.8 - 10.8 Thousand/uL Final    RBC 04/23/2024 5.72 (H)  3.80 - 5.10 Million/uL Final    Hemoglobin 04/23/2024 12.1  11.7 - 15.5 g/dL Final    Hematocrit 04/23/2024 40.5  35.0 - 45.0 % Final    MCV 04/23/2024 70.8 (L)  80.0 - 100.0 fL Final    MCH 04/23/2024 21.2 (L)  27.0 - 33.0 pg Final    MCHC 04/23/2024 29.9 (L)  32.0 - 36.0 g/dL Final    RDW 04/23/2024 18.3 (H)  11.0 - 15.0 % Final    Platelets 04/23/2024 332  140 - 400 Thousand/uL Final    MPV 04/23/2024 11.4  7.5 - 12.5 fL Final    Neutrophils, Abs 04/23/2024 6,087  1,500 - 7,800 cells/uL Final    Lymph # 04/23/2024 2,596  850 - 3,900 cells/uL Final    Mono # 04/23/2024 1,009 (H)  200 - 950 cells/uL Final    Eos # 04/23/2024 546 (H)  15 - 500 cells/uL Final    Baso # 04/23/2024 62  0 - 200 cells/uL Final    Neutrophils Relative 04/23/2024 59.1  % Final    Lymph % 04/23/2024 25.2  % Final    Mono % 04/23/2024 9.8  % Final    Eosinophil % 04/23/2024 5.3  % Final    Basophil % 04/23/2024 0.6  % Final   Telephone on 01/11/2024   Component Date Value Ref Range Status    Glucose 01/26/2024 130 (H)  65 - 99 mg/dL Final    BUN 01/26/2024 24  7 - 25 mg/dL Final    Creatinine 01/26/2024 1.09 (H)  0.50 - 1.05 mg/dL Final    eGFR 01/26/2024 55 (L)  > OR = 60 mL/min/1.73m2 Final    BUN/Creatinine Ratio 01/26/2024 22  6 - 22 (calc) Final    Sodium 01/26/2024 141  135 - 146 mmol/L Final    Potassium 01/26/2024 4.5  3.5 - 5.3 mmol/L Final    Chloride 01/26/2024 105  98 - 110 mmol/L Final    CO2 01/26/2024 28  20 - 32 mmol/L Final    Calcium 01/26/2024 9.5  8.6 - 10.4 mg/dL Final    Total Protein 01/26/2024 6.7  6.1 - 8.1 g/dL Final    Albumin 01/26/2024 4.1  3.6 - 5.1 g/dL Final    Globulin, Total 01/26/2024 2.6  1.9 - 3.7 g/dL (calc) Final    Albumin/Globulin Ratio 01/26/2024 1.6  1.0 - 2.5 (calc) Final    Total Bilirubin 01/26/2024 0.5  0.2 - 1.2 mg/dL Final     Alkaline Phosphatase 01/26/2024 104  37 - 153 U/L Final    AST 01/26/2024 15  10 - 35 U/L Final    ALT 01/26/2024 14  6 - 29 U/L Final    Hemoglobin A1C 01/26/2024 8.3 (H)  <5.7 % of total Hgb Final    Cholesterol 01/26/2024 127  <200 mg/dL Final    HDL 01/26/2024 36 (L)  > OR = 50 mg/dL Final    Triglycerides 01/26/2024 108  <150 mg/dL Final    LDL Cholesterol 01/26/2024 72  mg/dL (calc) Final    HDL/Cholesterol Ratio 01/26/2024 3.5  <5.0 (calc) Final    Non HDL Chol. (LDL+VLDL) 01/26/2024 91  <130 mg/dL (calc) Final       Past Medical History:   Diagnosis Date    DDD (degenerative disc disease), lumbar     DM type 2 with diabetic dyslipidemia     Dyslipidemia     GERD (gastroesophageal reflux disease)     Hypertension      No past surgical history on file.  No family history on file.    Tests to Keep You Healthy    Mammogram: Met on 11/30/2023  Eye Exam: DUE  Colon Cancer Screening: Met on 11/10/2023  Last Blood Pressure <= 139/89 (4/24/2024): NO  Last HbA1c < 8 (04/23/2024): Yes      The 10-year CVD risk score (D'Agoino, et al., 2008) is: 24.2%    Values used to calculate the score:      Age: 69 years      Sex: Female      Diabetic: Yes      Tobacco smoker: No      Systolic Blood Pressure: 138 mmHg      Is BP treated: Yes      HDL Cholesterol: 41 mg/dL      Total Cholesterol: 145 mg/dL     Marital Status:   Alcohol History:  reports no history of alcohol use.  Tobacco History:  reports that she has never smoked. She has never been exposed to tobacco smoke. She has never used smokeless tobacco.  Drug History:  reports no history of drug use.    Health Maintenance Topics with due status: Not Due       Topic Last Completion Date    DEXA Scan 01/12/2023    Low Dose Statin 05/25/2023    Foot Exam 09/28/2023    Colorectal Cancer Screening 11/10/2023    Mammogram 11/30/2023    Diabetes Urine Screening 04/23/2024    Lipid Panel 04/23/2024    Hemoglobin A1c 04/23/2024     Immunization History   Administered  Date(s) Administered    COVID-19, MRNA, LN-S, PF (MODERNA FULL 0.5 ML DOSE) 03/30/2021, 04/27/2021    Influenza - Quadrivalent - High Dose - PF (65 years and older) 11/20/2020    Influenza - Trivalent (ADULT) 08/29/2017    Influenza Split 08/29/2017    Pneumococcal Conjugate - 20 Valent 09/08/2022    Pneumococcal Polysaccharide - 23 Valent 04/28/2016       Review of patient's allergies indicates:  No Known Allergies    Current Outpatient Medications:     alcohol swabs (ALCOHOL WIPES) PadM, Apply 1 each topically once daily., Disp: 200 each, Rfl: 1    amLODIPine (NORVASC) 5 MG tablet, Take 1 tablet (5 mg total) by mouth once daily., Disp: 90 tablet, Rfl: 3    aspirin 325 MG tablet, Take 325 mg by mouth once daily. , Disp: , Rfl:     blood sugar diagnostic Strp, To check BG 2 times daily, to use with insurance preferred meter, Disp: 180 strip, Rfl: 3    blood-glucose meter kit, To check BG 2 times daily, to use with insurance preferred meter, Disp: 1 each, Rfl: 0    calcium-vitamin D tablet 600 mg-200 units, Take 1 tablet by mouth once daily. , Disp: , Rfl:     cholecalciferol, vitamin D3, (VITAMIN D3) 2,000 unit Cap, Take 1 capsule by mouth once daily. , Disp: , Rfl:     empagliflozin (JARDIANCE) 25 mg tablet, Take 1 tablet (25 mg total) by mouth once daily., Disp: 90 tablet, Rfl: 3    gabapentin (NEURONTIN) 300 MG capsule, Take 1 capsule in AM and 2 capsules at bedtime, Disp: 270 capsule, Rfl: 3    glipiZIDE (GLUCOTROL) 5 MG tablet, Take 2 tablets (10 mg total) by mouth 2 (two) times daily with meals., Disp: 360 tablet, Rfl: 3    lancets Misc, To check BG 2 times daily, to use with insurance preferred meter (Patient not taking: Reported on 5/3/2022), Disp: 180 each, Rfl: 3    lisinopriL (PRINIVIL,ZESTRIL) 20 MG tablet, Take 2 tablets (40 mg total) by mouth once daily., Disp: 180 tablet, Rfl: 3    meloxicam (MOBIC) 15 MG tablet, Take 1 tablet (15 mg total) by mouth once daily., Disp: 90 tablet, Rfl: 3    metFORMIN  (GLUCOPHAGE) 1000 MG tablet, Take 1 tablet (1,000 mg total) by mouth 2 (two) times daily with meals., Disp: 180 tablet, Rfl: 3    metoprolol succinate (TOPROL-XL) 50 MG 24 hr tablet, Take 1 tablet (50 mg total) by mouth once daily., Disp: 90 tablet, Rfl: 3    omeprazole (PRILOSEC) 20 MG capsule, Take 1 capsule (20 mg total) by mouth once daily., Disp: 90 capsule, Rfl: 3    ondansetron (ZOFRAN-ODT) 4 MG TbDL, Take 1 tablet (4 mg total) by mouth every 6 (six) hours as needed (nausea/vomiting)., Disp: 12 tablet, Rfl: 0    pravastatin (PRAVACHOL) 40 MG tablet, Take 1 tablet (40 mg total) by mouth once daily. For cholesterol., Disp: 90 tablet, Rfl: 3    tirzepatide (MOUNJARO) 5 mg/0.5 mL PnIj, Inject 5 mg into the skin every 7 days., Disp: 4 Pen, Rfl: 5    tiZANidine (ZANAFLEX) 4 MG tablet, Take 1 tablet (4 mg total) by mouth nightly as needed (back pain/muscle spasm)., Disp: 90 tablet, Rfl: 3    traMADoL (ULTRAM) 50 mg tablet, Take 1 tablet (50 mg total) by mouth every 6 (six) hours as needed for Pain., Disp: 20 tablet, Rfl: 0    Review of Systems   Constitutional:  Negative for appetite change, chills, fever and unexpected weight change (wt down 9lbs since November).   HENT:  Negative for sore throat and trouble swallowing.    Eyes:  Negative for visual disturbance.   Respiratory:  Negative for cough, shortness of breath and wheezing.    Cardiovascular:  Negative for chest pain, palpitations and leg swelling.   Gastrointestinal:  Negative for abdominal pain, blood in stool, constipation, diarrhea, nausea and vomiting.   Genitourinary:  Positive for frequency. Negative for dysuria, flank pain, hematuria and urgency.   Musculoskeletal:  Positive for arthralgias and back pain (Bilateral leg pain/heaviness R>L). Negative for gait problem and neck pain.   Skin:  Negative for wound.   Neurological:  Negative for dizziness, syncope, speech difficulty, weakness, numbness and headaches.   Psychiatric/Behavioral:  Negative for  "dysphoric mood. The patient is not nervous/anxious.           Objective:      Vitals:    04/24/24 1144 04/24/24 1148 04/24/24 1328   BP: (!) 150/72 (!) 150/68 138/62   Pulse: 65     SpO2: 98%     Weight: 72.1 kg (159 lb)     Height: 5' 4" (1.626 m)       Physical Exam  Vitals and nursing note reviewed.   Constitutional:       General: She is not in acute distress.     Appearance: Normal appearance. She is well-developed. She is not toxic-appearing.   HENT:      Head: Normocephalic and atraumatic.      Right Ear: Tympanic membrane and ear canal normal.      Left Ear: Tympanic membrane and ear canal normal.   Neck:      Vascular: No carotid bruit.   Cardiovascular:      Rate and Rhythm: Normal rate and regular rhythm.      Heart sounds: Murmur (2nd ICS LSB) heard.      Systolic murmur is present with a grade of 2/6.      No friction rub. No gallop.   Pulmonary:      Effort: Pulmonary effort is normal. No respiratory distress.      Breath sounds: Normal breath sounds. No wheezing or rales.   Abdominal:      Palpations: Abdomen is soft.   Musculoskeletal:      Cervical back: Neck supple.      Right lower leg: No edema.      Left lower leg: No edema.   Lymphadenopathy:      Cervical: No cervical adenopathy.   Skin:     General: Skin is warm and dry.      Findings: No rash.   Neurological:      General: No focal deficit present.      Mental Status: She is alert and oriented to person, place, and time.      Motor: Motor function is intact.      Gait: Gait normal.   Psychiatric:         Mood and Affect: Mood normal.           Assessment:       1. DM type 2 with diabetic dyslipidemia    2. Essential hypertension    3. Dyslipidemia    4. Lumbar radiculopathy    5. Stage 3a chronic kidney disease           Plan:       1. DM type 2 with diabetic dyslipidemia  -improved with A1c down to 6.9%, patient only took mounjaro 10 mg for one-month and has been off for almost 2 months.  Will restart though at lower dose 5 mg weekly.  -    "  tirzepatide (MOUNJARO) 5 mg/0.5 mL PnIj; Inject 5 mg into the skin every 7 days.  Dispense: 4 Pen; Refill: 5    2. Essential hypertension   -BP improved on recheck    3. Dyslipidemia   -reviewed recent labs with patient, stable    4. Lumbar radiculopathy  -reviewed last year's MRI with patient and again recommend she schedule with Dr. Cortez.  Can increase her evening gabapentin dose for now  -     gabapentin (NEURONTIN) 300 MG capsule; Take 1 capsule in AM and 2 capsules at bedtime  Dispense: 270 capsule; Refill: 3    5. Stage 3a chronic kidney disease   -stable, improved on most recent labs    Follow up in about 3 months (around 7/24/2024) for Diabetes.          Counseled on age and gender appropriate medical preventative services, including cancer screenings, immunizations, overall nutritional health, need for a consistent exercise regimen and an overall push towards maintaining a vigorous and active lifestyle.      4/24/2024 Priyanka Garibay NP

## 2024-04-25 ENCOUNTER — TELEPHONE (OUTPATIENT)
Dept: FAMILY MEDICINE | Facility: CLINIC | Age: 69
End: 2024-04-25
Payer: MEDICARE

## 2024-04-25 DIAGNOSIS — M54.16 LUMBAR RADICULOPATHY: Primary | ICD-10-CM

## 2024-04-25 LAB
ALBUMIN SERPL-MCNC: 4.2 G/DL (ref 3.6–5.1)
ALBUMIN/CREAT UR: 14 MG/G CREAT
ALBUMIN/GLOB SERPL: 1.8 (CALC) (ref 1–2.5)
ALP SERPL-CCNC: 90 U/L (ref 37–153)
ALT SERPL-CCNC: 10 U/L (ref 6–29)
APPEARANCE UR: CLEAR
AST SERPL-CCNC: 13 U/L (ref 10–35)
BACTERIA #/AREA URNS HPF: ABNORMAL /HPF
BACTERIA UR CULT: ABNORMAL
BACTERIA UR CULT: ABNORMAL
BASOPHILS # BLD AUTO: 62 CELLS/UL (ref 0–200)
BASOPHILS NFR BLD AUTO: 0.6 %
BILIRUB SERPL-MCNC: 0.3 MG/DL (ref 0.2–1.2)
BILIRUB UR QL STRIP: NEGATIVE
BUN SERPL-MCNC: 22 MG/DL (ref 7–25)
BUN/CREAT SERPL: NORMAL (CALC) (ref 6–22)
CALCIUM SERPL-MCNC: 9.4 MG/DL (ref 8.6–10.4)
CHLORIDE SERPL-SCNC: 108 MMOL/L (ref 98–110)
CHOLEST SERPL-MCNC: 145 MG/DL
CHOLEST/HDLC SERPL: 3.5 (CALC)
CO2 SERPL-SCNC: 24 MMOL/L (ref 20–32)
COLOR UR: YELLOW
CREAT SERPL-MCNC: 0.98 MG/DL (ref 0.6–1)
CREAT UR-MCNC: 80 MG/DL (ref 20–275)
EGFR: 62 ML/MIN/1.73M2
EOSINOPHIL # BLD AUTO: 546 CELLS/UL (ref 15–500)
EOSINOPHIL NFR BLD AUTO: 5.3 %
ERYTHROCYTE [DISTWIDTH] IN BLOOD BY AUTOMATED COUNT: 18.3 % (ref 11–15)
GLOBULIN SER CALC-MCNC: 2.4 G/DL (CALC) (ref 1.9–3.7)
GLUCOSE SERPL-MCNC: 98 MG/DL (ref 65–99)
GLUCOSE UR QL STRIP: ABNORMAL
HBA1C MFR BLD: 6.9 % OF TOTAL HGB
HCT VFR BLD AUTO: 40.5 % (ref 35–45)
HDLC SERPL-MCNC: 41 MG/DL
HGB BLD-MCNC: 12.1 G/DL (ref 11.7–15.5)
HGB UR QL STRIP: NEGATIVE
HYALINE CASTS #/AREA URNS LPF: ABNORMAL /LPF
KETONES UR QL STRIP: NEGATIVE
LDLC SERPL CALC-MCNC: 85 MG/DL (CALC)
LEUKOCYTE ESTERASE UR QL STRIP: ABNORMAL
LYMPHOCYTES # BLD AUTO: 2596 CELLS/UL (ref 850–3900)
LYMPHOCYTES NFR BLD AUTO: 25.2 %
MCH RBC QN AUTO: 21.2 PG (ref 27–33)
MCHC RBC AUTO-ENTMCNC: 29.9 G/DL (ref 32–36)
MCV RBC AUTO: 70.8 FL (ref 80–100)
MICROALBUMIN UR-MCNC: 1.1 MG/DL
MONOCYTES # BLD AUTO: 1009 CELLS/UL (ref 200–950)
MONOCYTES NFR BLD AUTO: 9.8 %
NEUTROPHILS # BLD AUTO: 6087 CELLS/UL (ref 1500–7800)
NEUTROPHILS NFR BLD AUTO: 59.1 %
NITRITE UR QL STRIP: NEGATIVE
NONHDLC SERPL-MCNC: 104 MG/DL (CALC)
PH UR STRIP: 5.5 [PH] (ref 5–8)
PLATELET # BLD AUTO: 332 THOUSAND/UL (ref 140–400)
PMV BLD REES-ECKER: 11.4 FL (ref 7.5–12.5)
POTASSIUM SERPL-SCNC: 4.3 MMOL/L (ref 3.5–5.3)
PROT SERPL-MCNC: 6.6 G/DL (ref 6.1–8.1)
PROT UR QL STRIP: NEGATIVE
RBC # BLD AUTO: 5.72 MILLION/UL (ref 3.8–5.1)
RBC #/AREA URNS HPF: ABNORMAL /HPF
SERVICE CMNT-IMP: ABNORMAL
SODIUM SERPL-SCNC: 144 MMOL/L (ref 135–146)
SP GR UR STRIP: 1.02 (ref 1–1.03)
SQUAMOUS #/AREA URNS HPF: ABNORMAL /HPF
TRIGL SERPL-MCNC: 93 MG/DL
TSH SERPL-ACNC: 1.22 MIU/L (ref 0.4–4.5)
WBC # BLD AUTO: 10.3 THOUSAND/UL (ref 3.8–10.8)
WBC #/AREA URNS HPF: ABNORMAL /HPF

## 2024-04-25 NOTE — TELEPHONE ENCOUNTER
Spoke with pt in regards to messages sent. Pt stated that she received a telephone call from Dr. Donald's office. They do not take [employer] insurance. Recommend to the pt to call her insurance company, that they should have a list of who is accepting [employer] insurance at this moment. Pt acknowledged understanding    Any other option?

## 2024-04-25 NOTE — TELEPHONE ENCOUNTER
----- Message from Joan Chen sent at 4/25/2024 11:57 AM CDT -----  The patient said the pain Dr. Mathew gave her does not take PHN. She needs a pain  That takes [employer]  PT'S # [phone] GH

## 2024-04-25 NOTE — TELEPHONE ENCOUNTER
Spoke with pt in regards to messages sent. Pt stated that she is not having any symptoms of a UTI. Verbalized then no treatment is needed at this time. Pt acknowledged understanding.

## 2024-04-26 ENCOUNTER — HOSPITAL ENCOUNTER (EMERGENCY)
Facility: HOSPITAL | Age: 69
Discharge: HOME | End: 2024-04-26
Attending: EMERGENCY MEDICINE
Payer: MEDICARE

## 2024-04-26 VITALS
DIASTOLIC BLOOD PRESSURE: 70 MMHG | WEIGHT: 159 LBS | BODY MASS INDEX: 27.14 KG/M2 | OXYGEN SATURATION: 95 % | RESPIRATION RATE: 18 BRPM | HEIGHT: 64 IN | SYSTOLIC BLOOD PRESSURE: 153 MMHG | HEART RATE: 65 BPM | TEMPERATURE: 99 F

## 2024-04-26 DIAGNOSIS — S09.90XA CLOSED HEAD INJURY, INITIAL ENCOUNTER: ICD-10-CM

## 2024-04-26 DIAGNOSIS — S00.03XA HEMATOMA OF FRONTAL SCALP, INITIAL ENCOUNTER: ICD-10-CM
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
## 2024-04-26 DIAGNOSIS — R59.0 LYMPHADENOPATHY OF HEAD AND NECK REGION: ICD-10-CM

## 2024-04-26 DIAGNOSIS — M54.2 NECK PAIN, MUSCULOSKELETAL: ICD-10-CM

## 2024-04-26 PROCEDURE — 99284 EMERGENCY DEPT VISIT MOD MDM: CPT | Mod: 25

## 2024-04-26 PROCEDURE — 25000003 PHARM REV CODE 250: Performed by: NURSE PRACTITIONER

## 2024-04-26 RX ORDER — ACETAMINOPHEN 325 MG/1
650 TABLET ORAL
Status: COMPLETED | OUTPATIENT
Start: 2024-04-26 | End: 2024-04-26

## 2024-04-26 RX ADMIN — ACETAMINOPHEN 650 MG: 325 TABLET ORAL at 08:04

## 2024-04-26 NOTE — TELEPHONE ENCOUNTER
Spoke with pt in regards to messages sent. Verbalized verbatim per Priyanka. Pt acknowledged understanding. Gave pt Dr. Blount office number. Faxed referral to Dr. Blount.

## 2024-04-26 NOTE — Clinical Note
"Rhonda Wong" Galina was seen and treated in our emergency department on 4/26/2024.  She may return to work on 04/29/2024.       If you have any questions or concerns, please don't hesitate to call.      Angie Wu MD"

## 2024-04-27 NOTE — ED PROVIDER NOTES
Encounter Date: 4/26/2024       History     Chief Complaint   Patient presents with    Fall     Fall to ground at 1430, hit forehead.  Not on blood thinners.   Pt unsure if LOC.  States remembers walking, then being on concrete.       Emergent evaluation of a 69-year-old female history of hypertension type 2 diabetes hyperlipidemia degenerative disc disease of lumbar spine presents to the ER after a mechanical fall resulting in her hitting her left forehead and having a left frontal scalp hematoma and contusion with mild abrasion.  Patient reports that she was came from the zoom was ambulating suddenly fell forward hitting her left forehead unknown loss of consciousness amnesia of the event.  She reports she immediately became aware when she was on the ground she reports that she was having mild bilateral neck pain.  No back pain.  She reports no preceding or post chest pain shortness of breath abdominal pain palpitations dizziness lightheadedness or weakness.  She reports she was arthritis in her shoulders but no new injury.  Normal gait she reports she takes aspirin but does not take any blood thinners      Review of patient's allergies indicates:  No Known Allergies  Past Medical History:   Diagnosis Date    DDD (degenerative disc disease), lumbar     DM type 2 with diabetic dyslipidemia     Dyslipidemia     GERD (gastroesophageal reflux disease)     Hypertension      No past surgical history on file.  No family history on file.  Social History     Tobacco Use    Smoking status: Never     Passive exposure: Never    Smokeless tobacco: Never   Substance Use Topics    Alcohol use: Never    Drug use: Never     Review of Systems   Constitutional:  Negative for activity change, appetite change, chills, diaphoresis, fatigue and fever.   HENT:  Negative for congestion, ear discharge, nosebleeds, postnasal drip, rhinorrhea, sinus pressure and sore throat.    Eyes:  Negative for visual disturbance.   Respiratory:  Negative  for cough, chest tightness, shortness of breath, wheezing and stridor.    Cardiovascular:  Negative for chest pain and palpitations.   Gastrointestinal:  Negative for abdominal distention, abdominal pain, blood in stool, constipation, diarrhea, nausea and vomiting.   Genitourinary:  Negative for flank pain.   Musculoskeletal:  Positive for neck pain. Negative for arthralgias, back pain, myalgias and neck stiffness.   Skin:  Positive for color change and wound. Negative for pallor and rash.   Allergic/Immunologic: Negative for immunocompromised state.   Neurological:  Positive for headaches. Negative for dizziness, tremors, seizures, syncope, facial asymmetry, speech difficulty, weakness, light-headedness and numbness.   Hematological:  Does not bruise/bleed easily.   Psychiatric/Behavioral:  Negative for agitation and confusion. The patient is not nervous/anxious.    All other systems reviewed and are negative.      Physical Exam     Initial Vitals [04/26/24 2042]   BP Pulse Resp Temp SpO2   (!) 196/78 65 18 98.8 °F (37.1 °C) 95 %      MAP       --         Physical Exam    Nursing note and vitals reviewed.  Constitutional: She appears well-developed and well-nourished. She is not diaphoretic. No distress.   HENT:   Head: Normocephalic. Head is with abrasion and with contusion. Head is without raccoon's eyes, without Galicia's sign, without laceration, without right periorbital erythema and without left periorbital erythema. Hair is normal.       Right Ear: Tympanic membrane, external ear and ear canal normal. No mastoid tenderness. No hemotympanum.   Left Ear: Tympanic membrane, external ear and ear canal normal. No mastoid tenderness. No hemotympanum.   Nose: Nose normal. No mucosal edema, rhinorrhea, nose lacerations, sinus tenderness, nasal deformity, septal deviation or nasal septal hematoma. No epistaxis.  No foreign bodies. Right sinus exhibits no maxillary sinus tenderness and no frontal sinus tenderness. Left  sinus exhibits no maxillary sinus tenderness and no frontal sinus tenderness.   Mouth/Throat: Oropharynx is clear and moist. Dental caries present. No oropharyngeal exudate.       Eyes: Conjunctivae and EOM are normal. Pupils are equal, round, and reactive to light.   Neck: Neck supple. No tracheal deviation present.   Normal range of motion.  Cardiovascular:  Normal rate, regular rhythm, normal heart sounds and intact distal pulses.     Exam reveals no gallop and no friction rub.       No murmur heard.  Pulmonary/Chest: Breath sounds normal. No stridor. No respiratory distress. She has no wheezes. She has no rhonchi. She has no rales. She exhibits no tenderness.   Abdominal: Abdomen is soft. Bowel sounds are normal.   Musculoskeletal:         General: No edema. Normal range of motion.      Cervical back: Normal range of motion and neck supple.      Comments: Full range motion bilateral upper extremities without pain.  No pain on palpation of the cervical thoracic lumbar sacral spine.  Patient was muscular tenderness to bilateral neck.  No muscle spasms felt     Neurological: She is alert and oriented to person, place, and time. She has normal strength. No cranial nerve deficit or sensory deficit.   Skin: Skin is warm and dry. No rash noted. No erythema. No pallor.   Psychiatric: She has a normal mood and affect. Her behavior is normal. Judgment and thought content normal.         ED Course   Procedures  Labs Reviewed - No data to display       Imaging Results              CT Cervical Spine Without Contrast (Final result)  Result time 04/26/24 22:54:25      Final result by Aristeo Guillaume MD (04/26/24 22:54:25)                   Impression:      No acute cervical fracture.    Moderate degenerative changes, most notable at C5-C6 and C6-C7.    Mildly enlarged right paratracheal lymph node just above the level of the thoracic inlet, measuring 1.1 cm in short axis.  Unclear if this finding is reactive or related to  other process.  Consider follow-up outpatient CT chest or comparison with any prior outside imaging if available.      Electronically signed by: Aristeo Guillaume MD  Date:    04/26/2024  Time:    22:54               Narrative:    EXAMINATION:  CT CERVICAL SPINE WITHOUT CONTRAST    CLINICAL HISTORY:  Polytrauma, blunt;    TECHNIQUE:  Low dose axial images, sagittal and coronal reformations were performed though the cervical spine.  Contrast was not administered.    COMPARISON:  Cervical spine radiograph, 01/30/2024.    FINDINGS:    Minimal anterolisthesis of C4 with respect to C5.  Otherwise, normal sagittal alignment.  Moderate degenerative changes in the cervical spine.  Disc space height loss most notable at C5-C6 and C6-C7.  Probable moderate central canal stenosis at C5-C6 and mild central canal stenosis at C6-C7.  Multilevel neural foraminal narrowing, most notable at C5-C6. No acute fracture identified.  Prevertebral soft tissues are normal.  Nonspecific mildly enlarged right paratracheal lymph node measuring up to 1.1 cm in short axis (series 2, image 185).  This finding is just above the level of the thoracic inlet.  Lung apices are essentially clear.  Thyroid gland is unremarkable.                                       CT Head Without Contrast (Final result)  Result time 04/26/24 22:34:30      Final result by Aristeo Guillaume MD (04/26/24 22:34:30)                   Impression:      No CT evidence of acute intracranial abnormality.    Chronic microvascular ischemic changes.      Electronically signed by: Aristeo Guillaume MD  Date:    04/26/2024  Time:    22:34               Narrative:    EXAMINATION:  CT HEAD WITHOUT CONTRAST    CLINICAL HISTORY:  Head trauma, minor (Age >= 65y);    TECHNIQUE:  Low dose axial images were obtained through the head.  Coronal and sagittal reformations were also performed. Contrast was not administered.    COMPARISON:  04/24/2019.    FINDINGS:  Mild patchy periventricular  white matter hypoattenuation suggestive of chronic microvascular ischemic change, though nonspecific.    No evidence of acute territorial infarct, hemorrhage, mass effect, or midline shift.    Ventricles are normal in size and configuration.    No displaced calvarial fracture.  Scalp soft tissue edema and minimal scalp hematoma along the left frontal calvarium.    Minimal mucosal thickening in the paranasal sinuses.  No air-fluid levels.  Mastoid air cells are essentially clear.  Partially imaged scattered dental caries.                                       Medications   acetaminophen tablet 650 mg (650 mg Oral Given 4/26/24 2058)     Medical Decision Making  Emergent evaluation of a 69-year-old female history of hypertension type 2 diabetes hyperlipidemia degenerative disc disease of lumbar spine presents to the ER after a mechanical fall resulting in her hitting her left forehead and having a left frontal scalp hematoma and contusion with mild abrasion.  Patient reports that she was came from the zoom was ambulating suddenly fell forward hitting her left forehead unknown loss of consciousness amnesia of the event.  She reports she immediately became aware when she was on the ground she reports that she was having mild bilateral neck pain.  No back pain.  She reports no preceding or post chest pain shortness of breath abdominal pain palpitations dizziness lightheadedness or weakness.  She reports she was arthritis in her shoulders but no new injury.  Normal gait she reports she takes aspirin but does not take any blood thinners  On physical exam patient is in no distress.  She has a 4 x 5 cm hematoma to the left forehead with mild abrasion no active bleeding no skull deformity or depressed skull fracture no facial bone tenderness.  Musculoskeletal neck pain.  No pain down midline cervical thoracic lumbar sacral spine full range motion of upper and lower extremities normal gait.  GCS is 15 no other acute  abnormalities on HEENT exam.  [facility]    Patient presents for emergent evaluation of acute fall just prior to arrival does not have memory of the event possible LOC left forehead hematoma that poses a threat to life and/or bodily function.   Differential diagnosis includes but was not limited to intracranial bleeding cerebral edema skull fracture facial bone fracture dental fracture cervical injury  In the ED patient found to have acute closed head injury, left frontal scalp hematoma and contusion, cervical strain, fall  I ordered CT scan and personally reviewed it and reviewed the radiologist interpretation.  CT significant for No CT evidence of acute intracranial abnormality.     Chronic microvascular ischemic changes.  CT cervical spine revealedNo acute cervical fracture.     Moderate degenerative changes, most notable at C5-C6 and C6-C7.     Mildly enlarged right paratracheal lymph node just above the level of the thoracic inlet, measuring 1.1 cm in short axis.  Unclear if this finding is reactive or related to other process.  Consider follow-up outpatient CT chest or comparison with any prior outside imaging if available.            Discharge [facility]     Patient was managed in the ED with Tylenol.  Ice pack discharged home with Tylenol ibuprofen    The response to treatment was good.  Blood pressure has been 196/78 with history of hypertension prior to discharge home is now 153/70 will go home and take her home blood pressure medicines as prescribed- do for lisinopril tonight.  Patient has been informed of the in large right paratracheal lymph node at the level of the thoracic inlet and will need outpatient CT of the chest for further evaluation  Patient was discharged in stable condition.  Detailed return precautions discussed.  Patient was told to follow up with primary care physician or specialist based on their diagnosis  Angie Wu MD                                        Clinical Impression:  Final  diagnoses:  [W19.XXXA] Fall, initial encounter (Primary)  [S09.90XA] Closed head injury, initial encounter  [S00.03XA] Hematoma of frontal scalp, initial encounter  [M54.2] Neck pain, musculoskeletal  [R59.0] Lymphadenopathy of head and neck region          ED Disposition Condition    Discharge           ED Prescriptions    None       Follow-up Information       Follow up With Specialties Details Why Contact Info Additional Information    [facility] - Emergency Dept Emergency Medicine Go to  If symptoms worsen [address] [facility] [id number] [phone] 1st floor    Priyanka Garibay, NP Family Medicine Schedule an appointment as soon as possible for a visit in 1 week If your symptoms do not improve, abd for out pt CT neck for further eval of enlarged lymph node in the right paratracheal region [address]  [address]  [facility] [place] [address]  [phone]                Angie Wu MD  04/26/24 0815

## 2024-04-27 NOTE — FIRST PROVIDER EVALUATION
Emergency Department TeleTriage Encounter Note      CHIEF COMPLAINT    Chief Complaint   Patient presents with    Fall     Fall to ground at 1430, hit forehead.  Not on blood thinners.   Pt unsure if LOC.  States remembers walking, then being on concrete.         VITAL SIGNS   Initial Vitals [04/26/24 2042]   BP Pulse Resp Temp SpO2   (!) 196/78 65 18 98.8 °F (37.1 °C) 95 %      MAP       --            ALLERGIES    Review of patient's allergies indicates:  No Known Allergies    PROVIDER TRIAGE NOTE  This is a teletriage evaluation of a 69 y.o. female presenting to the ED with c/o fall with head injury around 230p today. Unsure if there was a LOC. Limited physical exam via telehealth: The patient is awake, alert, answering questions appropriately and is not in respiratory distress. Initial orders will be placed and care will be transferred to an alternate provider when patient is roomed for a full evaluation. Any additional orders and the final disposition will be determined by that provider.         ORDERS  Labs Reviewed - No data to display    ED Orders (720h ago, onward)      Start Ordered     Status Ordering Provider    04/26/24 2100 04/26/24 2050  acetaminophen tablet 650 mg  ED 1 Time         Ordered TONY WHITE    04/26/24 2051 04/26/24 2050  CT Cervical Spine Without Contrast  1 time imaging         Ordered TONY WHITE    04/26/24 2050 04/26/24 2050  CT Head Without Contrast  1 time imaging         Ordered TONY WHITE              Virtual Visit Note: The provider triage portion of this emergency department evaluation and documentation was performed via [employer], a HIPAA-compliant telemedicine application, in concert with a tele-presenter in the room. A face to face patient evaluation with one of my colleagues will occur once the patient is placed in an emergency department room.      DISCLAIMER: This note was prepared with M[employer] voice recognition transcription software. Garbled syntax,  mangled pronouns, and other bizarre constructions may be attributed to that software system.

## 2024-04-29 ENCOUNTER — PATIENT OUTREACH (OUTPATIENT)
Dept: EMERGENCY MEDICINE | Facility: OTHER | Age: 69
End: 2024-04-29
Payer: MEDICARE

## 2024-04-29 DIAGNOSIS — I10 ESSENTIAL HYPERTENSION: ICD-10-CM

## 2024-04-29 RX ORDER — AMLODIPINE BESYLATE 5 MG/1
5 TABLET ORAL DAILY
Qty: 90 TABLET | Refills: 3 | Status: SHIPPED | OUTPATIENT
Start: 2024-04-29 | End: 2024-05-14 | Stop reason: SDUPTHER

## 2024-04-29 NOTE — TELEPHONE ENCOUNTER
----- Message from Estefania Hewitt LPN sent at 4/29/2024  2:38 PM CDT -----  Regarding: Hospital F/U  Good Afternoon,    I spoke to this patient for Post ED Chronic Conditions due to recent ED visit after a fall. Pt is needing a Hospital F/U within 7 days of D/C from ED and I was unable to schedule this in [facility]. Pt has C/O persistent headache after her fall and wanted me to relay this message. She also stated she has a medication she is needing refills on, but could not tell me which medication it was while we were on the call. Can clinic staff please reach out to this patient regarding scheduling a Hospital F/U visit?    Thanks in advance!

## 2024-05-01 ENCOUNTER — TELEPHONE (OUTPATIENT)
Dept: FAMILY MEDICINE | Facility: CLINIC | Age: 69
End: 2024-05-01
Payer: MEDICARE

## 2024-05-01 NOTE — TELEPHONE ENCOUNTER
----- Message from Priyanka Martinez sent at 5/1/2024  2:04 PM CDT -----  Pt's BP was 177/80, 177/88 today. Is that high. Please advise. Pt #[phone]

## 2024-05-01 NOTE — TELEPHONE ENCOUNTER
Spoke to pt and she stated her blood pressure was In the 190 and she can not keep her balance. Let pt know with her symptoms she needs to go to the Er Pt verbalized understanding . Priyanka diaz

## 2024-05-07 ENCOUNTER — TELEPHONE (OUTPATIENT)
Dept: FAMILY MEDICINE | Facility: CLINIC | Age: 69
End: 2024-05-07

## 2024-05-07 NOTE — TELEPHONE ENCOUNTER
----- Message from Joan Cehn sent at 5/7/2024  8:24 AM CDT -----  The patient had an ER f/u today with Priyanka.  She cancelled it. She had to watch her grand children. Pt's # [phone]

## 2024-05-14 ENCOUNTER — OFFICE VISIT (OUTPATIENT)
Dept: FAMILY MEDICINE | Facility: CLINIC | Age: 69
End: 2024-05-14
Payer: MEDICARE

## 2024-05-14 VITALS
WEIGHT: 159 LBS | HEIGHT: 64 IN | SYSTOLIC BLOOD PRESSURE: 148 MMHG | OXYGEN SATURATION: 97 % | BODY MASS INDEX: 27.14 KG/M2 | DIASTOLIC BLOOD PRESSURE: 60 MMHG | HEART RATE: 85 BPM

## 2024-05-14 DIAGNOSIS — R59.0 LOCALIZED ENLARGED LYMPH NODES: ICD-10-CM

## 2024-05-14 DIAGNOSIS — E78.5 DM TYPE 2 WITH DIABETIC DYSLIPIDEMIA: ICD-10-CM

## 2024-05-14 DIAGNOSIS — R55 SYNCOPE, UNSPECIFIED SYNCOPE TYPE: Primary | ICD-10-CM

## 2024-05-14 DIAGNOSIS — I10 ESSENTIAL HYPERTENSION: ICD-10-CM

## 2024-05-14 DIAGNOSIS — E11.69 DM TYPE 2 WITH DIABETIC DYSLIPIDEMIA: ICD-10-CM

## 2024-05-14 PROCEDURE — 3288F FALL RISK ASSESSMENT DOCD: CPT | Mod: CPTII,S$GLB,, | Performed by: NURSE PRACTITIONER

## 2024-05-14 PROCEDURE — 99214 OFFICE O/P EST MOD 30 MIN: CPT | Mod: S$GLB,,, | Performed by: NURSE PRACTITIONER

## 2024-05-14 PROCEDURE — 4010F ACE/ARB THERAPY RXD/TAKEN: CPT | Mod: CPTII,S$GLB,, | Performed by: NURSE PRACTITIONER

## 2024-05-14 PROCEDURE — 1160F RVW MEDS BY RX/DR IN RCRD: CPT | Mod: CPTII,S$GLB,, | Performed by: NURSE PRACTITIONER

## 2024-05-14 PROCEDURE — 3066F NEPHROPATHY DOC TX: CPT | Mod: CPTII,S$GLB,, | Performed by: NURSE PRACTITIONER

## 2024-05-14 PROCEDURE — 3061F NEG MICROALBUMINURIA REV: CPT | Mod: CPTII,S$GLB,, | Performed by: NURSE PRACTITIONER

## 2024-05-14 PROCEDURE — 3008F BODY MASS INDEX DOCD: CPT | Mod: CPTII,S$GLB,, | Performed by: NURSE PRACTITIONER

## 2024-05-14 PROCEDURE — 3078F DIAST BP <80 MM HG: CPT | Mod: CPTII,S$GLB,, | Performed by: NURSE PRACTITIONER

## 2024-05-14 PROCEDURE — 3044F HG A1C LEVEL LT 7.0%: CPT | Mod: CPTII,S$GLB,, | Performed by: NURSE PRACTITIONER

## 2024-05-14 PROCEDURE — 1100F PTFALLS ASSESS-DOCD GE2>/YR: CPT | Mod: CPTII,S$GLB,, | Performed by: NURSE PRACTITIONER

## 2024-05-14 PROCEDURE — 1159F MED LIST DOCD IN RCRD: CPT | Mod: CPTII,S$GLB,, | Performed by: NURSE PRACTITIONER

## 2024-05-14 PROCEDURE — 3077F SYST BP >= 140 MM HG: CPT | Mod: CPTII,S$GLB,, | Performed by: NURSE PRACTITIONER

## 2024-05-14 PROCEDURE — 1126F AMNT PAIN NOTED NONE PRSNT: CPT | Mod: CPTII,S$GLB,, | Performed by: NURSE PRACTITIONER

## 2024-05-14 RX ORDER — AMLODIPINE BESYLATE 5 MG/1
5 TABLET ORAL 2 TIMES DAILY
Qty: 180 TABLET | Refills: 3 | Status: SHIPPED | OUTPATIENT
Start: 2024-05-14

## 2024-05-14 NOTE — PROGRESS NOTES
SUBJECTIVE:    Patient ID: Rhonda Mojica is a 69 y.o. female.    Chief Complaint: Fall (No bottles//Pt is here for a ER f/u for a fall//Pt stated that she had a fall and hit her head. Pt states that she is feeling better. Decline headaches and vision issues//ASCENCION )    Pt here for ER f/u- reports was walking up walkway to go into her house and the next thing she knew she was laying facedown on the ground. Reports had brief LOC though had no preceeding symptoms or recall of exactly what happened. Struck left forehead on ground and developed hematoma. no injuries to hands. Pt reports prior to fall she was feeling fine prior to this episode. She was able to get up off the ground and other than pain to forehead she was feeling okay. Family drove her to the ER- CT scan of head and negative for acute findings. CT neck did mention Mildly enlarged right paratracheal lymph node just above the level of the thoracic inlet, measuring 1.1 cm in short axis.  Unclear if this finding is reactive or related to other process.  Consider follow-up outpatient CT chest or comparison with any prior outside imaging if available.     Pt discharged from ER and reports since then has felt okay- hematoma has nearly resolved though area is still slightly tender to touch.  Denies headache, dizziness, speech or vision change.  No palpitations or shortness of breath.  Denies any hypoglycemic episodes    Follows with Dr. Telly abdalla every 6 months, next appointment is in August             Telephone on 04/10/2024   Component Date Value Ref Range Status    Hemoglobin A1C 04/23/2024 6.9 (H)  <5.7 % of total Hgb Final    Cholesterol 04/23/2024 145  <200 mg/dL Final    HDL 04/23/2024 41 (L)  > OR = 50 mg/dL Final    Triglycerides 04/23/2024 93  <150 mg/dL Final    LDL Cholesterol 04/23/2024 85  mg/dL (calc) Final    HDL/Cholesterol Ratio 04/23/2024 3.5  <5.0 (calc) Final    Non HDL Chol. (LDL+VLDL) 04/23/2024 104  <130 mg/dL (calc) Final     Creatinine, Urine 04/23/2024 80  20 - 275 mg/dL Final    Microalb, Ur 04/23/2024 1.1  See Note: mg/dL Final    Microalb/Creat Ratio 04/23/2024 14  <30 mg/g creat Final    TSH w/reflex to FT4 04/23/2024 1.22  0.40 - 4.50 mIU/L Final    Color, UA 04/23/2024 YELLOW  YELLOW Final    Appearance, UA 04/23/2024 CLEAR  CLEAR Final    Specific Gravity, UA 04/23/2024 1.025  1.001 - 1.035 Final    pH, UA 04/23/2024 5.5  5.0 - 8.0 Final    Glucose, UA 04/23/2024 3+ (A)  NEGATIVE Final    Bilirubin, UA 04/23/2024 NEGATIVE  NEGATIVE Final    Ketones, UA 04/23/2024 NEGATIVE  NEGATIVE Final    Occult Blood UA 04/23/2024 NEGATIVE  NEGATIVE Final    Protein, UA 04/23/2024 NEGATIVE  NEGATIVE Final    Nitrite, UA 04/23/2024 NEGATIVE  NEGATIVE Final    Leukocytes, UA 04/23/2024 2+ (A)  NEGATIVE Final    WBC Casts, UA 04/23/2024 40-60 (A)  < OR = 5 /HPF Final    RBC Casts, UA 04/23/2024 NONE SEEN  < OR = 2 /HPF Final    Squam Epithel, UA 04/23/2024 6-10 (A)  < OR = 5 /HPF Final    Bacteria, UA 04/23/2024 FEW (A)  NONE SEEN /HPF Final    Hyaline Casts, UA 04/23/2024 NONE SEEN  NONE SEEN /LPF Final    Service Cmt: 04/23/2024    Final    Reflexive Urine Culture 04/23/2024    Final    Urine Culture, Routine 04/23/2024    Final    Glucose 04/23/2024 98  65 - 99 mg/dL Final    BUN 04/23/2024 22  7 - 25 mg/dL Final    Creatinine 04/23/2024 0.98  0.60 - 1.00 mg/dL Final    eGFR 04/23/2024 62  > OR = 60 mL/min/1.73m2 Final    BUN/Creatinine Ratio 04/23/2024 SEE NOTE:  6 - 22 (calc) Final    Sodium 04/23/2024 144  135 - 146 mmol/L Final    Potassium 04/23/2024 4.3  3.5 - 5.3 mmol/L Final    Chloride 04/23/2024 108  98 - 110 mmol/L Final    CO2 04/23/2024 24  20 - 32 mmol/L Final    Calcium 04/23/2024 9.4  8.6 - 10.4 mg/dL Final    Total Protein 04/23/2024 6.6  6.1 - 8.1 g/dL Final    Albumin 04/23/2024 4.2  3.6 - 5.1 g/dL Final    Globulin, Total 04/23/2024 2.4  1.9 - 3.7 g/dL (calc) Final    Albumin/Globulin Ratio 04/23/2024 1.8  1.0 - 2.5 (calc)  Final    Total Bilirubin 04/23/2024 0.3  0.2 - 1.2 mg/dL Final    Alkaline Phosphatase 04/23/2024 90  37 - 153 U/L Final    AST 04/23/2024 13  10 - 35 U/L Final    ALT 04/23/2024 10  6 - 29 U/L Final    WBC 04/23/2024 10.3  3.8 - 10.8 Thousand/uL Final    RBC 04/23/2024 5.72 (H)  3.80 - 5.10 Million/uL Final    Hemoglobin 04/23/2024 12.1  11.7 - 15.5 g/dL Final    Hematocrit 04/23/2024 40.5  35.0 - 45.0 % Final    MCV 04/23/2024 70.8 (L)  80.0 - 100.0 fL Final    MCH 04/23/2024 21.2 (L)  27.0 - 33.0 pg Final    MCHC 04/23/2024 29.9 (L)  32.0 - 36.0 g/dL Final    RDW 04/23/2024 18.3 (H)  11.0 - 15.0 % Final    Platelets 04/23/2024 332  140 - 400 Thousand/uL Final    MPV 04/23/2024 11.4  7.5 - 12.5 fL Final    Neutrophils, Abs 04/23/2024 6,087  1,500 - 7,800 cells/uL Final    Lymph # 04/23/2024 2,596  850 - 3,900 cells/uL Final    Mono # 04/23/2024 1,009 (H)  200 - 950 cells/uL Final    Eos # 04/23/2024 546 (H)  15 - 500 cells/uL Final    Baso # 04/23/2024 62  0 - 200 cells/uL Final    Neutrophils Relative 04/23/2024 59.1  % Final    Lymph % 04/23/2024 25.2  % Final    Mono % 04/23/2024 9.8  % Final    Eosinophil % 04/23/2024 5.3  % Final    Basophil % 04/23/2024 0.6  % Final   Telephone on 01/11/2024   Component Date Value Ref Range Status    Glucose 01/26/2024 130 (H)  65 - 99 mg/dL Final    BUN 01/26/2024 24  7 - 25 mg/dL Final    Creatinine 01/26/2024 1.09 (H)  0.50 - 1.05 mg/dL Final    eGFR 01/26/2024 55 (L)  > OR = 60 mL/min/1.73m2 Final    BUN/Creatinine Ratio 01/26/2024 22  6 - 22 (calc) Final    Sodium 01/26/2024 141  135 - 146 mmol/L Final    Potassium 01/26/2024 4.5  3.5 - 5.3 mmol/L Final    Chloride 01/26/2024 105  98 - 110 mmol/L Final    CO2 01/26/2024 28  20 - 32 mmol/L Final    Calcium 01/26/2024 9.5  8.6 - 10.4 mg/dL Final    Total Protein 01/26/2024 6.7  6.1 - 8.1 g/dL Final    Albumin 01/26/2024 4.1  3.6 - 5.1 g/dL Final    Globulin, Total 01/26/2024 2.6  1.9 - 3.7 g/dL (calc) Final     Albumin/Globulin Ratio 01/26/2024 1.6  1.0 - 2.5 (calc) Final    Total Bilirubin 01/26/2024 0.5  0.2 - 1.2 mg/dL Final    Alkaline Phosphatase 01/26/2024 104  37 - 153 U/L Final    AST 01/26/2024 15  10 - 35 U/L Final    ALT 01/26/2024 14  6 - 29 U/L Final    Hemoglobin A1C 01/26/2024 8.3 (H)  <5.7 % of total Hgb Final    Cholesterol 01/26/2024 127  <200 mg/dL Final    HDL 01/26/2024 36 (L)  > OR = 50 mg/dL Final    Triglycerides 01/26/2024 108  <150 mg/dL Final    LDL Cholesterol 01/26/2024 72  mg/dL (calc) Final    HDL/Cholesterol Ratio 01/26/2024 3.5  <5.0 (calc) Final    Non HDL Chol. (LDL+VLDL) 01/26/2024 91  <130 mg/dL (calc) Final       Past Medical History:   Diagnosis Date    DDD (degenerative disc disease), lumbar     DM type 2 with diabetic dyslipidemia     Dyslipidemia     GERD (gastroesophageal reflux disease)     Hypertension      History reviewed. No pertinent surgical history.  No family history on file.    Tests to Keep You Healthy    Mammogram: Met on 11/30/2023  Eye Exam: DUE  Colon Cancer Screening: Met on 11/10/2023  Last Blood Pressure <= 139/89 (5/14/2024): NO  Last HbA1c < 8 (04/23/2024): Yes      The 10-year CVD risk score (DANG'Agostino, et al., 2008) is: 28.6%    Values used to calculate the score:      Age: 69 years      Sex: Female      Diabetic: Yes      Tobacco smoker: No      Systolic Blood Pressure: 148 mmHg      Is BP treated: Yes      HDL Cholesterol: 41 mg/dL      Total Cholesterol: 145 mg/dL     Marital Status:   Alcohol History:  reports no history of alcohol use.  Tobacco History:  reports that she has never smoked. She has never been exposed to tobacco smoke. She has never used smokeless tobacco.  Drug History:  reports no history of drug use.    Health Maintenance Topics with due status: Not Due       Topic Last Completion Date    DEXA Scan 01/12/2023    Influenza Vaccine 03/08/2023    Low Dose Statin 05/25/2023    Foot Exam 09/28/2023    Colorectal Cancer Screening  11/10/2023    Mammogram 11/30/2023    Diabetes Urine Screening 04/23/2024    Lipid Panel 04/23/2024    Hemoglobin A1c 04/23/2024     Immunization History   Administered Date(s) Administered    COVID-19, MRNA, LN-S, PF (MODERNA FULL 0.5 ML DOSE) 03/30/2021, 04/27/2021    Influenza - Quadrivalent - High Dose - PF (65 years and older) 11/20/2020    Influenza - Trivalent (ADULT) 08/29/2017    Influenza Split 08/29/2017    Pneumococcal Conjugate - 20 Valent 09/08/2022    Pneumococcal Polysaccharide - 23 Valent 04/28/2016       Review of patient's allergies indicates:  No Known Allergies    Current Outpatient Medications:     alcohol swabs (ALCOHOL WIPES) PadM, Apply 1 each topically once daily., Disp: 200 each, Rfl: 1    amLODIPine (NORVASC) 5 MG tablet, Take 1 tablet (5 mg total) by mouth 2 (two) times daily., Disp: 180 tablet, Rfl: 3    aspirin 325 MG tablet, Take 325 mg by mouth once daily. , Disp: , Rfl:     blood sugar diagnostic Strp, To check BG 2 times daily, to use with insurance preferred meter, Disp: 180 strip, Rfl: 3    blood-glucose meter kit, To check BG 2 times daily, to use with insurance preferred meter, Disp: 1 each, Rfl: 0    calcium-vitamin D tablet 600 mg-200 units, Take 1 tablet by mouth once daily. , Disp: , Rfl:     cholecalciferol, vitamin D3, (VITAMIN D3) 2,000 unit Cap, Take 1 capsule by mouth once daily. , Disp: , Rfl:     empagliflozin (JARDIANCE) 25 mg tablet, Take 1 tablet (25 mg total) by mouth once daily., Disp: 90 tablet, Rfl: 3    gabapentin (NEURONTIN) 300 MG capsule, Take 1 capsule in AM and 2 capsules at bedtime, Disp: 270 capsule, Rfl: 3    glipiZIDE (GLUCOTROL) 5 MG tablet, Take 2 tablets (10 mg total) by mouth 2 (two) times daily with meals., Disp: 360 tablet, Rfl: 3    lancets Misc, To check BG 2 times daily, to use with insurance preferred meter (Patient not taking: Reported on 5/3/2022), Disp: 180 each, Rfl: 3    lisinopriL (PRINIVIL,ZESTRIL) 20 MG tablet, Take 2 tablets (40 mg  total) by mouth once daily., Disp: 180 tablet, Rfl: 3    meloxicam (MOBIC) 15 MG tablet, Take 1 tablet (15 mg total) by mouth once daily., Disp: 90 tablet, Rfl: 3    metFORMIN (GLUCOPHAGE) 1000 MG tablet, Take 1 tablet (1,000 mg total) by mouth 2 (two) times daily with meals., Disp: 180 tablet, Rfl: 3    metoprolol succinate (TOPROL-XL) 50 MG 24 hr tablet, Take 1 tablet (50 mg total) by mouth once daily., Disp: 90 tablet, Rfl: 3    omeprazole (PRILOSEC) 20 MG capsule, Take 1 capsule (20 mg total) by mouth once daily., Disp: 90 capsule, Rfl: 3    ondansetron (ZOFRAN-ODT) 4 MG TbDL, Take 1 tablet (4 mg total) by mouth every 6 (six) hours as needed (nausea/vomiting)., Disp: 12 tablet, Rfl: 0    pravastatin (PRAVACHOL) 40 MG tablet, Take 1 tablet (40 mg total) by mouth once daily. For cholesterol., Disp: 90 tablet, Rfl: 3    tirzepatide (MOUNJARO) 5 mg/0.5 mL PnIj, Inject 5 mg into the skin every 7 days., Disp: 4 Pen, Rfl: 5    tiZANidine (ZANAFLEX) 4 MG tablet, Take 1 tablet (4 mg total) by mouth nightly as needed (back pain/muscle spasm)., Disp: 90 tablet, Rfl: 3    traMADoL (ULTRAM) 50 mg tablet, Take 1 tablet (50 mg total) by mouth every 6 (six) hours as needed for Pain., Disp: 20 tablet, Rfl: 0    Review of Systems   Constitutional:  Negative for appetite change, chills, fever and unexpected weight change.   Eyes:  Negative for visual disturbance.   Respiratory:  Negative for cough, shortness of breath and wheezing.    Cardiovascular:  Negative for chest pain, palpitations and leg swelling.   Gastrointestinal:  Negative for abdominal pain, constipation, diarrhea, nausea and vomiting.   Genitourinary:  Positive for frequency. Negative for dysuria, flank pain, hematuria and urgency.   Musculoskeletal:  Positive for arthralgias and back pain (Bilateral leg pain/heaviness R>L). Negative for gait problem and neck pain.   Skin:  Negative for wound.   Neurological:  Positive for syncope (see HPI). Negative for dizziness,  "speech difficulty, weakness, numbness and headaches.          Objective:      Vitals:    05/14/24 1134 05/14/24 1140   BP: (!) 150/62 (!) 148/60   Pulse: 85    SpO2: 97%    Weight: 72.1 kg (159 lb)    Height: 5' 4" (1.626 m)      Physical Exam  Vitals and nursing note reviewed.   Constitutional:       General: She is not in acute distress.     Appearance: Normal appearance. She is well-developed. She is not toxic-appearing.   HENT:      Head: Normocephalic and atraumatic.        Right Ear: Tympanic membrane and ear canal normal.      Left Ear: Tympanic membrane and ear canal normal.   Neck:      Vascular: No carotid bruit.   Cardiovascular:      Rate and Rhythm: Normal rate and regular rhythm.      Heart sounds: Murmur (2nd ICS LSB) heard.      Systolic murmur is present with a grade of 2/6.      No friction rub. No gallop.   Pulmonary:      Effort: Pulmonary effort is normal. No respiratory distress.      Breath sounds: Normal breath sounds. No wheezing or rales.   Abdominal:      Palpations: Abdomen is soft.   Musculoskeletal:      Cervical back: Neck supple. No bony tenderness. No pain with movement or spinous process tenderness.      Right lower leg: No edema.      Left lower leg: No edema.   Lymphadenopathy:      Cervical: No cervical adenopathy.   Skin:     General: Skin is warm and dry.      Findings: No rash.   Neurological:      General: No focal deficit present.      Mental Status: She is alert and oriented to person, place, and time.      Motor: Motor function is intact.      Gait: Gait normal.   Psychiatric:         Mood and Affect: Mood normal.           Assessment:       1. Syncope, unspecified syncope type    2. Localized enlarged lymph nodes    3. Essential hypertension    4. DM type 2 with diabetic dyslipidemia           Plan:       1. Syncope, unspecified syncope type   -pt describes syncopal episode without preceding symptoms. Reports since then has felt fine and no further issues. Cautioned if " she has further presyncope/syncope, palpitations/dizziness would recommend holter monitor and f/u with Dr. Varner to r/o cardiac arrhythmia    2. Localized enlarged lymph nodes  -will plan to repeat CT of neck in 6 weeks  -     CT Soft Tissue Neck With Contrast; Future; Expected date: 06/03/2024    3. Essential hypertension  -BP elevated today, recommend increasing amlodipine to BID though cautioned on increase in leg swelling  -     amLODIPine (NORVASC) 5 MG tablet; Take 1 tablet (5 mg total) by mouth 2 (two) times daily.  Dispense: 180 tablet; Refill: 3    4. DM type 2 with diabetic dyslipidemia   -improved on last A1C and denies any hypoglycemic episodes    Follow up if symptoms worsen or fail to improve, for as scheduled.          Counseled on age and gender appropriate medical preventative services, including cancer screenings, immunizations, overall nutritional health, need for a consistent exercise regimen and an overall push towards maintaining a vigorous and active lifestyle.      5/14/2024 Priyanka Garibay NP

## 2024-05-20 ENCOUNTER — PATIENT OUTREACH (OUTPATIENT)
Dept: EMERGENCY MEDICINE | Facility: OTHER | Age: 69
End: 2024-05-20
Payer: MEDICARE

## 2024-05-22 ENCOUNTER — TELEPHONE (OUTPATIENT)
Dept: PAIN MEDICINE | Facility: CLINIC | Age: 69
End: 2024-05-22

## 2024-05-22 ENCOUNTER — OFFICE VISIT (OUTPATIENT)
Dept: PAIN MEDICINE | Facility: CLINIC | Age: 69
End: 2024-05-22
Payer: MEDICARE

## 2024-05-22 VITALS
WEIGHT: 158.94 LBS | BODY MASS INDEX: 27.13 KG/M2 | HEART RATE: 72 BPM | DIASTOLIC BLOOD PRESSURE: 74 MMHG | HEIGHT: 64 IN | SYSTOLIC BLOOD PRESSURE: 153 MMHG

## 2024-05-22 DIAGNOSIS — M54.16 LUMBAR RADICULOPATHY: Primary | ICD-10-CM

## 2024-05-22 DIAGNOSIS — M51.36 DDD (DEGENERATIVE DISC DISEASE), LUMBAR: ICD-10-CM

## 2024-05-22 DIAGNOSIS — M54.16 LUMBAR RADICULOPATHY: ICD-10-CM

## 2024-05-22 DIAGNOSIS — M47.896 OTHER SPONDYLOSIS, LUMBAR REGION: ICD-10-CM

## 2024-05-22 DIAGNOSIS — M48.062 SPINAL STENOSIS OF LUMBAR REGION WITH NEUROGENIC CLAUDICATION: Primary | ICD-10-CM

## 2024-05-22 PROCEDURE — 3061F NEG MICROALBUMINURIA REV: CPT | Mod: CPTII,S$GLB,, | Performed by: ANESTHESIOLOGY

## 2024-05-22 PROCEDURE — 3044F HG A1C LEVEL LT 7.0%: CPT | Mod: CPTII,S$GLB,, | Performed by: ANESTHESIOLOGY

## 2024-05-22 PROCEDURE — 99204 OFFICE O/P NEW MOD 45 MIN: CPT | Mod: S$GLB,,, | Performed by: ANESTHESIOLOGY

## 2024-05-22 PROCEDURE — 3008F BODY MASS INDEX DOCD: CPT | Mod: CPTII,S$GLB,, | Performed by: ANESTHESIOLOGY

## 2024-05-22 PROCEDURE — 99999 PR PBB SHADOW E&M-EST. PATIENT-LVL IV: CPT | Mod: PBBFAC,,, | Performed by: ANESTHESIOLOGY

## 2024-05-22 PROCEDURE — 3078F DIAST BP <80 MM HG: CPT | Mod: CPTII,S$GLB,, | Performed by: ANESTHESIOLOGY

## 2024-05-22 PROCEDURE — 3066F NEPHROPATHY DOC TX: CPT | Mod: CPTII,S$GLB,, | Performed by: ANESTHESIOLOGY

## 2024-05-22 PROCEDURE — 1125F AMNT PAIN NOTED PAIN PRSNT: CPT | Mod: CPTII,S$GLB,, | Performed by: ANESTHESIOLOGY

## 2024-05-22 PROCEDURE — 1159F MED LIST DOCD IN RCRD: CPT | Mod: CPTII,S$GLB,, | Performed by: ANESTHESIOLOGY

## 2024-05-22 PROCEDURE — 3288F FALL RISK ASSESSMENT DOCD: CPT | Mod: CPTII,S$GLB,, | Performed by: ANESTHESIOLOGY

## 2024-05-22 PROCEDURE — 3077F SYST BP >= 140 MM HG: CPT | Mod: CPTII,S$GLB,, | Performed by: ANESTHESIOLOGY

## 2024-05-22 PROCEDURE — 4010F ACE/ARB THERAPY RXD/TAKEN: CPT | Mod: CPTII,S$GLB,, | Performed by: ANESTHESIOLOGY

## 2024-05-22 PROCEDURE — 1100F PTFALLS ASSESS-DOCD GE2>/YR: CPT | Mod: CPTII,S$GLB,, | Performed by: ANESTHESIOLOGY

## 2024-05-22 NOTE — TELEPHONE ENCOUNTER
Types of orders made on 05/22/2024: Outpatient Referral, Procedure Request      Order Date:5/22/2024   Ordering User:MARISA BLOUNT [[id number]]   Encounter Provider:Marisa Blount MD [[id number]]   Authorizing Provider: Jerome Blount MD [[id number]]   Department:[facility] PAIN MANAGEMENT[[id number]]      Common Order Information   Procedure -> Transforaminal Injection (Specify level and laterality) Cmt: B/L             L4-5      Order Specific Information   Order: Procedure Order to Pain Management [Custom: [id number]]  Order #:          [id number]: 1 FUTURE     Priority: Routine  Class: Clinic Performed     Future Order Information       Expir   es on:05/22/2025            Expected by:05/22/2024                   Associated Diagnoses       M54.16 Lumbar radiculopathy       M48.062 Spinal stenosis of lumbar region with neurogenic claudication       Facility Name: -> [place]              Priority: Routine  Class: Clinic Performed     Future Order Information       Expires on:05/22/2025            Expected by:05/22/2024                   Associated Diagnoses          M54.16 Lumbar radiculopathy       M48.062 Spinal stenosis of lumbar region with neurogenic claudication       Procedure -> Transforaminal Injection (Specify level and late

## 2024-05-22 NOTE — PROGRESS NOTES
This note was completed with dictation software and grammatical errors may exist.    Referring Physician: Priyanka Garibay NP    PCP: Priyanka Garibay NP      CC: low back and leg pain    HPI:   Rhonda Mojica is a 69 y.o. female  Referred to us for low back and leg pain.  Pain has worsened over the past 4 years.  No recent traumatic incident.  She does state having a fall due to pain in her legs.  It is a constant intermittent aching, sharp pain in her lower back.  Pain radiates to her bilateral calves.  Pain worsens with sitting, walking getting up.  Pain improves with rest.  She is tried home exercises and physical therapy with minimal benefit.  She takes gabapentin NSAIDs with mild benefits.  MRI was performed in December 2022 which shows severe stenosis at L4-5.  She has not had any interventions.  She did not seem side surgery.  She denies any worsening weakness.  No bowel bladder changes.    ROS:  CONSTITUTIONAL: No fevers, chills, night sweats, wt. loss, appetite changes  SKIN: no rashes or itching  ENT: No headaches, head trauma, vision changes, or eye pain  LYMPH NODES: None noticed   CV: No chest pain, palpitations.   RESP: No shortness of breath, dyspnea on exertion, cough, wheezing, or hemoptysis  GI: No nausea, emesis, diarrhea, constipation, melena, hematochezia, pain.    : No dysuria, hematuria, urgency, or frequency   HEME: No easy bruising, bleeding problems  PSYCHIATRIC: No depression, anxiety, psychosis, hallucinations.  NEURO: No seizures, memory loss, dizziness or difficulty sleeping  MSK: +HPI      Past Medical History:   Diagnosis Date    DDD (degenerative disc disease), lumbar     DM type 2 with diabetic dyslipidemia     Dyslipidemia     GERD (gastroesophageal reflux disease)     Hypertension      No past surgical history on file.  No family history on file.  Social History     Socioeconomic History    Marital status:    Tobacco Use    Smoking status: Never     Passive exposure:  "Never    Smokeless tobacco: Never   Substance and Sexual Activity    Alcohol use: Never    Drug use: Never     Social Determinants of Health     Stress: No Stress Concern Present (9/17/2019)    [employer] - Occupational Stress Questionnaire     Feeling of Stress : Not at all         Medications/Allergies: See med card    Vitals:    05/22/24 0836   BP: (!) 153/74   Pulse: 72   Weight: 72.1 kg (158 lb 15.2 oz)   Height: 5' 4" (1.626 m)   PainSc:   1   PainLoc: Back         Physical exam:    GENERAL: A and O x3, the patient appears well groomed and is in no acute distress.  Skin: No rashes or obvious lesions  HEENT: normocephalic, atraumatic  CARDIOVASCULAR:  Palpable peripheral pulses  LUNGS: easy work of breathing  ABDOMEN: soft, nontender   UPPER EXTREMITIES: Normal alignment, normal range of motion, no atrophy, no skin changes,  hair growth and nail growth normal and equal bilaterally. No swelling, no tenderness.    LOWER EXTREMITIES:  Normal alignment, normal range of motion, no atrophy, no skin changes,  hair growth and nail growth normal and equal bilaterally. No swelling, no tenderness.    LUMBAR SPINE  Lumbar spine: ROM is  Limited with flexion extension and oblique extension with  moderate increased pain.    Ascencion's test causes no increased pain on either side.    Supine straight leg raise is negative bilaterally.   Positive femoral stretch  Internal and external rotation of the hip causes no increased pain on either side.  Myofascial exam: No tenderness to palpation across lumbar paraspinous muscles.    MENTAL STATUS: normal orientation, speech, language, and fund of knowledge for social situation.  Emotional state appropriate.    MOTOR: Tone and bulk: normal bilateral upper and lower Strength: normal   SENSATION: Light touch and pinprick intact bilaterally  REFLEXES: normal, symmetric, nonbrisk.  Toes down, no clonus. No hoffmans.  GAIT: normal rise, base, steps, and arm " swing.        Imaging:  MRI L-spine 12/2022  At L2-L3 there is marked disc space narrowing with prominent endplate bony spurring and associated annular disc bulging that produces moderate concentric narrowing of the dural sac. There is also mild-to-moderate narrowing of the left neural foramen at this level.     At L3-L4 there is moderate diffuse annular disc bulging and there is mild bony overgrowth of the facet joints and thickening of the ligamentum flavum that produces moderately severe central canal stenosis and also narrowing of the lateral recess bilaterally. There is also mild bilateral foraminal narrowing.     At L4-L5 there is severe central canal stenosis due to extensive diffuse annular disc bulging and moderate bony overgrowth of the facet joints and extensive thickening of the ligamentum flavum. There is also moderate left and severe right foraminal narrowing at this level. The far right lateral disc protrusion appears to contribute to the foraminal stenosis.     At L5-S1 the disc appears within normal limits. There is no significant disc bulging or spinal canal or foraminal encroachment.       Assessment:   Patient presents with lower back pain.  1. Spinal stenosis of lumbar region with neurogenic claudication    2. Lumbar radiculopathy    3. DDD (degenerative disc disease), lumbar    4. Other spondylosis, lumbar region          Plan:  I have stressed the importance of physical activity and exercise to improve overall health  Reviewed pertinent imaging and records with patient  I think that the patient's back pain and radicular leg symptoms are due to degenerative disc disease and have recommended a lumbar epidural steroid injection to the Bilateral L4-5 level(s).    May consider Veriflex procedure versus neurosurgical consult if above not helpful.    Follow-up after procedure  Thank you for referring this interesting patient, and I look forward to continuing to collaborate in her care.

## 2024-05-23 ENCOUNTER — HOSPITAL ENCOUNTER (OUTPATIENT)
Dept: RADIOLOGY | Facility: HOSPITAL | Age: 69
Discharge: HOME | End: 2024-05-23
Attending: NURSE PRACTITIONER
Payer: MEDICARE

## 2024-05-23 ENCOUNTER — TELEPHONE (OUTPATIENT)
Dept: FAMILY MEDICINE | Facility: CLINIC | Age: 69
End: 2024-05-23
Payer: MEDICARE

## 2024-05-23 DIAGNOSIS — R59.0 LOCALIZED ENLARGED LYMPH NODES: ICD-10-CM

## 2024-05-23 DIAGNOSIS — D35.1 PARATHYROID ADENOMA: Primary | ICD-10-CM

## 2024-05-23 PROCEDURE — 25500020 PHARM REV CODE 255: Mod: PO | Performed by: NURSE PRACTITIONER

## 2024-05-23 PROCEDURE — 70491 CT SOFT TISSUE NECK W/DYE: CPT | Mod: TC,PO

## 2024-05-23 PROCEDURE — 71260 CT THORAX DX C+: CPT | Mod: 26,,, | Performed by: RADIOLOGY

## 2024-05-23 PROCEDURE — 70491 CT SOFT TISSUE NECK W/DYE: CPT | Mod: 26,,, | Performed by: RADIOLOGY

## 2024-05-23 RX ADMIN — IOHEXOL 100 ML: 350 INJECTION, SOLUTION INTRAVENOUS at 09:05

## 2024-05-23 NOTE — TELEPHONE ENCOUNTER
----- Message from Priyanka Garibay NP sent at 5/23/2024  3:24 PM CDT -----  Please call patient let her know CT scan of the neck chest shows a 17 x 11 x 22 mm soft tissue mass in the lower neck just above where it enters into the chest.  This could be a parathyroid adenoma which is a noncancerous growth but can produce too much parathyroid hormone and affect calcium levels.  Her calcium levels however have been within normal range.  Alternatively this could be an isolated enlarged lymph node though no other lymph nodes are noted to be enlarged in the chest or neck.  Lungs are clear.  I would recommend checking an intact parathyroid hormone level and calcium level and then we can decide if any further testing is needed

## 2024-05-23 NOTE — TELEPHONE ENCOUNTER
Spoke with pt in regards to recent Ct scan results. Verbalized verbatim per Priyanka. Pt acknowledged understanding. Pt is willing to get the intact parathyroid hormone level and calcium level blood work done.

## 2024-05-28 DIAGNOSIS — Z00.00 ENCOUNTER FOR MEDICARE ANNUAL WELLNESS EXAM: ICD-10-CM

## 2024-06-17 DIAGNOSIS — E78.5 DYSLIPIDEMIA: ICD-10-CM

## 2024-06-17 RX ORDER — PRAVASTATIN SODIUM 40 MG/1
40 TABLET ORAL DAILY
Qty: 90 TABLET | Refills: 3 | Status: SHIPPED | OUTPATIENT
Start: 2024-06-17 | End: 2025-06-17

## 2024-06-17 NOTE — TELEPHONE ENCOUNTER
----- Message from Priyanka Martinez sent at 6/17/2024  9:38 AM CDT -----  Refill for pravastatin. [facility] on [facility]. Pt #[phone]

## 2024-07-24 ENCOUNTER — TELEPHONE (OUTPATIENT)
Dept: PAIN MEDICINE | Facility: CLINIC | Age: 69
End: 2024-07-24
Payer: MEDICARE

## 2024-07-24 DIAGNOSIS — M54.16 LUMBAR RADICULOPATHY: Primary | ICD-10-CM

## 2024-07-24 NOTE — TELEPHONE ENCOUNTER
Spoke with pt and scheduled  missed injection from 06/2024 for 08/08 went over instructions and holding ASA and Mobic

## 2024-07-24 NOTE — TELEPHONE ENCOUNTER
----- Message from Amanda Gerald sent at 7/24/2024  9:42 AM CDT -----  Regarding: appt request  Contact: pt  Type: Appointment Request    Caller is requesting an appointment.      Name of Caller:pt    Symptoms: back pain    Would the patient rather a call back or a response via [facility]? Call back    Best Call Back Number:[phone]    Additional Information: Pt requesting an injection.  She prefers thursdays

## 2024-08-01 ENCOUNTER — OFFICE VISIT (OUTPATIENT)
Dept: FAMILY MEDICINE | Facility: CLINIC | Age: 69
End: 2024-08-01
Payer: MEDICARE

## 2024-08-01 VITALS
WEIGHT: 164.63 LBS | DIASTOLIC BLOOD PRESSURE: 64 MMHG | SYSTOLIC BLOOD PRESSURE: 156 MMHG | HEART RATE: 67 BPM | HEIGHT: 64 IN | OXYGEN SATURATION: 97 % | BODY MASS INDEX: 28.1 KG/M2

## 2024-08-01 DIAGNOSIS — E78.5 DM TYPE 2 WITH DIABETIC DYSLIPIDEMIA: Primary | ICD-10-CM

## 2024-08-01 DIAGNOSIS — M54.16 LUMBAR RADICULOPATHY: ICD-10-CM

## 2024-08-01 DIAGNOSIS — E11.69 DM TYPE 2 WITH DIABETIC DYSLIPIDEMIA: Primary | ICD-10-CM

## 2024-08-01 DIAGNOSIS — R93.89 ABNORMAL CT SCAN, NECK: ICD-10-CM

## 2024-08-01 DIAGNOSIS — I10 ESSENTIAL HYPERTENSION: ICD-10-CM

## 2024-08-01 DIAGNOSIS — E78.5 DYSLIPIDEMIA: ICD-10-CM

## 2024-08-01 LAB — HBA1C MFR BLD: 7.5 %

## 2024-08-01 RX ORDER — BIMATOPROST 0.1 MG/ML
1 SOLUTION/ DROPS OPHTHALMIC NIGHTLY
COMMUNITY
Start: 2024-06-17

## 2024-08-01 NOTE — H&P (VIEW-ONLY)
SUBJECTIVE:    Patient ID: Rhonda Mojica is a 69 y.o. female.    Chief Complaint: Diabetes (No bottles//Pt is here to follow up on diabetes//KE)    Pt here for f/u- DM2, lipids, HTN, lumbar DDD    Pt reports overall doing okay. Reports since her syncopal episode when she fell she's been having mild pain to right upper arm- feels pain with certain movements, has full ROM of shoulder. 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  In the appropriate clinical setting, parathyroid adenoma can be considered.  Alternatively, isolated enlarged lymph node can be considered.  Clinical and laboratory correlation is requested, in particular for serologic evidence of hypercalcemia.  If further imaging evaluation is desired, nuclear medicine parathyroid scan is suggested. No other enlarged lymph nodes throughout the neck or chest. Pt was notified of results and recommended to have Ca and intact PTH labs but she hasn't had drawn yet    Since last visit was scheduled for lumbar injection with Dr. Blount but had to cancel and now rescheduled for next week    Follows with Dr. Varner cards every 6 months- has f/u appt with him today- BP high today but admits she didn't take BP meds today yet             Telephone on 04/10/2024   Component Date Value Ref Range Status    Hemoglobin A1C 04/23/2024 6.9 (H)  <5.7 % of total Hgb Final    Cholesterol 04/23/2024 145  <200 mg/dL Final    HDL 04/23/2024 41 (L)  > OR = 50 mg/dL Final    Triglycerides 04/23/2024 93  <150 mg/dL Final    LDL Cholesterol 04/23/2024 85  mg/dL (calc) Final    HDL/Cholesterol Ratio 04/23/2024 3.5  <5.0 (calc) Final    Non HDL Chol. (LDL+VLDL) 04/23/2024 104  <130 mg/dL (calc) Final    Creatinine, Urine 04/23/2024 80  20 - 275 mg/dL Final    Microalb, Ur  04/23/2024 1.1  See Note: mg/dL Final    Microalb/Creat Ratio 04/23/2024 14  <30 mg/g creat Final    TSH w/reflex to FT4 04/23/2024 1.22  0.40 - 4.50 mIU/L Final    Color, UA 04/23/2024 YELLOW  YELLOW Final    Appearance, UA 04/23/2024 CLEAR  CLEAR Final    Specific Gravity, UA 04/23/2024 1.025  1.001 - 1.035 Final    pH, UA 04/23/2024 5.5  5.0 - 8.0 Final    Glucose, UA 04/23/2024 3+ (A)  NEGATIVE Final    Bilirubin, UA 04/23/2024 NEGATIVE  NEGATIVE Final    Ketones, UA 04/23/2024 NEGATIVE  NEGATIVE Final    Occult Blood UA 04/23/2024 NEGATIVE  NEGATIVE Final    Protein, UA 04/23/2024 NEGATIVE  NEGATIVE Final    Nitrite, UA 04/23/2024 NEGATIVE  NEGATIVE Final    Leukocytes, UA 04/23/2024 2+ (A)  NEGATIVE Final    WBC Casts, UA 04/23/2024 40-60 (A)  < OR = 5 /HPF Final    RBC Casts, UA 04/23/2024 NONE SEEN  < OR = 2 /HPF Final    Squam Epithel, UA 04/23/2024 6-10 (A)  < OR = 5 /HPF Final    Bacteria, UA 04/23/2024 FEW (A)  NONE SEEN /HPF Final    Hyaline Casts, UA 04/23/2024 NONE SEEN  NONE SEEN /LPF Final    Service Cmt: 04/23/2024    Final    Reflexive Urine Culture 04/23/2024    Final    Urine Culture, Routine 04/23/2024    Final    Glucose 04/23/2024 98  65 - 99 mg/dL Final    BUN 04/23/2024 22  7 - 25 mg/dL Final    Creatinine 04/23/2024 0.98  0.60 - 1.00 mg/dL Final    eGFR 04/23/2024 62  > OR = 60 mL/min/1.73m2 Final    BUN/Creatinine Ratio 04/23/2024 SEE NOTE:  6 - 22 (calc) Final    Sodium 04/23/2024 144  135 - 146 mmol/L Final    Potassium 04/23/2024 4.3  3.5 - 5.3 mmol/L Final    Chloride 04/23/2024 108  98 - 110 mmol/L Final    CO2 04/23/2024 24  20 - 32 mmol/L Final    Calcium 04/23/2024 9.4  8.6 - 10.4 mg/dL Final    Total Protein 04/23/2024 6.6  6.1 - 8.1 g/dL Final    Albumin 04/23/2024 4.2  3.6 - 5.1 g/dL Final    Globulin, Total 04/23/2024 2.4  1.9 - 3.7 g/dL (calc) Final    Albumin/Globulin Ratio 04/23/2024 1.8  1.0 - 2.5 (calc) Final    Total Bilirubin 04/23/2024 0.3  0.2 - 1.2 mg/dL Final     Alkaline Phosphatase 04/23/2024 90  37 - 153 U/L Final    AST 04/23/2024 13  10 - 35 U/L Final    ALT 04/23/2024 10  6 - 29 U/L Final    WBC 04/23/2024 10.3  3.8 - 10.8 Thousand/uL Final    RBC 04/23/2024 5.72 (H)  3.80 - 5.10 Million/uL Final    Hemoglobin 04/23/2024 12.1  11.7 - 15.5 g/dL Final    Hematocrit 04/23/2024 40.5  35.0 - 45.0 % Final    MCV 04/23/2024 70.8 (L)  80.0 - 100.0 fL Final    MCH 04/23/2024 21.2 (L)  27.0 - 33.0 pg Final    MCHC 04/23/2024 29.9 (L)  32.0 - 36.0 g/dL Final    RDW 04/23/2024 18.3 (H)  11.0 - 15.0 % Final    Platelets 04/23/2024 332  140 - 400 Thousand/uL Final    MPV 04/23/2024 11.4  7.5 - 12.5 fL Final    Neutrophils, Abs 04/23/2024 6,087  1,500 - 7,800 cells/uL Final    Lymph # 04/23/2024 2,596  850 - 3,900 cells/uL Final    Mono # 04/23/2024 1,009 (H)  200 - 950 cells/uL Final    Eos # 04/23/2024 546 (H)  15 - 500 cells/uL Final    Baso # 04/23/2024 62  0 - 200 cells/uL Final    Neutrophils Relative 04/23/2024 59.1  % Final    Lymph % 04/23/2024 25.2  % Final    Mono % 04/23/2024 9.8  % Final    Eosinophil % 04/23/2024 5.3  % Final    Basophil % 04/23/2024 0.6  % Final       Past Medical History:   Diagnosis Date    DDD (degenerative disc disease), lumbar     DM type 2 with diabetic dyslipidemia     Dyslipidemia     GERD (gastroesophageal reflux disease)     Hypertension      History reviewed. No pertinent surgical history.  No family history on file.    Tests to Keep You Healthy    Mammogram: Met on 11/30/2023  Eye Exam: Met on 3/14/2024  Colon Cancer Screening: Met on 11/10/2023  Last Blood Pressure <= 139/89 (8/1/2024): NO  Last HbA1c < 8 (04/23/2024): Yes      The 10-year CVD risk score (Tammy et al., 2008) is: 32.4%    Values used to calculate the score:      Age: 69 years      Sex: Female      Diabetic: Yes      Tobacco smoker: No      Systolic Blood Pressure: 156 mmHg      Is BP treated: Yes      HDL Cholesterol: 41 mg/dL      Total Cholesterol: 145 mg/dL      Marital Status:   Alcohol History:  reports no history of alcohol use.  Tobacco History:  reports that she has never smoked. She has never been exposed to tobacco smoke. She has never used smokeless tobacco.  Drug History:  reports no history of drug use.    Health Maintenance Topics with due status: Not Due       Topic Last Completion Date    DEXA Scan 01/12/2023    Influenza Vaccine 03/08/2023    Colorectal Cancer Screening 11/10/2023    Mammogram 11/30/2023    Eye Exam 03/14/2024    Diabetes Urine Screening 04/23/2024    Lipid Panel 04/23/2024    Hemoglobin A1c 04/23/2024    Low Dose Statin 06/17/2024    Foot Exam 08/01/2024     Immunization History   Administered Date(s) Administered    COVID-19, MRNA, LN-S, PF (MODERNA FULL 0.5 ML DOSE) 03/30/2021, 04/27/2021    Influenza - Quadrivalent - High Dose - PF (65 years and older) 11/20/2020    Influenza - Trivalent (ADULT) 08/29/2017    Influenza Split 08/29/2017    Pneumococcal Conjugate - 20 Valent 09/08/2022    Pneumococcal Polysaccharide - 23 Valent 04/28/2016       Review of patient's allergies indicates:  No Known Allergies    Current Outpatient Medications:     LUMIGAN 0.01 % Drop, Place 1 drop into both eyes every evening., Disp: , Rfl:     alcohol swabs (ALCOHOL WIPES) PadM, Apply 1 each topically once daily., Disp: 200 each, Rfl: 1    amLODIPine (NORVASC) 5 MG tablet, Take 1 tablet (5 mg total) by mouth 2 (two) times daily., Disp: 180 tablet, Rfl: 3    aspirin 325 MG tablet, Take 325 mg by mouth once daily. , Disp: , Rfl:     blood sugar diagnostic Strp, To check BG 2 times daily, to use with insurance preferred meter, Disp: 180 strip, Rfl: 3    blood-glucose meter kit, To check BG 2 times daily, to use with insurance preferred meter, Disp: 1 each, Rfl: 0    calcium-vitamin D tablet 600 mg-200 units, Take 1 tablet by mouth once daily. , Disp: , Rfl:     cholecalciferol, vitamin D3, (VITAMIN D3) 2,000 unit Cap, Take 1 capsule by mouth once daily.  , Disp: , Rfl:     empagliflozin (JARDIANCE) 25 mg tablet, Take 1 tablet (25 mg total) by mouth once daily., Disp: 90 tablet, Rfl: 3    gabapentin (NEURONTIN) 300 MG capsule, Take 1 capsule in AM and 2 capsules at bedtime, Disp: 270 capsule, Rfl: 3    glipiZIDE (GLUCOTROL) 5 MG tablet, Take 2 tablets (10 mg total) by mouth 2 (two) times daily with meals., Disp: 360 tablet, Rfl: 3    lancets Misc, To check BG 2 times daily, to use with insurance preferred meter, Disp: 180 each, Rfl: 3    lisinopriL (PRINIVIL,ZESTRIL) 20 MG tablet, Take 2 tablets (40 mg total) by mouth once daily., Disp: 180 tablet, Rfl: 3    meloxicam (MOBIC) 15 MG tablet, Take 1 tablet (15 mg total) by mouth once daily., Disp: 90 tablet, Rfl: 3    metFORMIN (GLUCOPHAGE) 1000 MG tablet, Take 1 tablet (1,000 mg total) by mouth 2 (two) times daily with meals., Disp: 180 tablet, Rfl: 3    metoprolol succinate (TOPROL-XL) 50 MG 24 hr tablet, Take 1 tablet (50 mg total) by mouth once daily., Disp: 90 tablet, Rfl: 3    omeprazole (PRILOSEC) 20 MG capsule, Take 1 capsule (20 mg total) by mouth once daily., Disp: 90 capsule, Rfl: 3    ondansetron (ZOFRAN-ODT) 4 MG TbDL, Take 1 tablet (4 mg total) by mouth every 6 (six) hours as needed (nausea/vomiting)., Disp: 12 tablet, Rfl: 0    pravastatin (PRAVACHOL) 40 MG tablet, Take 1 tablet (40 mg total) by mouth once daily. For cholesterol., Disp: 90 tablet, Rfl: 3    tirzepatide 7.5 mg/0.5 mL PnIj, Inject 7.5 mg into the skin every 7 days., Disp: 4 Pen, Rfl: 5    tiZANidine (ZANAFLEX) 4 MG tablet, Take 1 tablet (4 mg total) by mouth nightly as needed (back pain/muscle spasm)., Disp: 90 tablet, Rfl: 3    traMADoL (ULTRAM) 50 mg tablet, Take 1 tablet (50 mg total) by mouth every 6 (six) hours as needed for Pain., Disp: 20 tablet, Rfl: 0    Review of Systems   Constitutional:  Negative for appetite change, chills, fever and unexpected weight change (wt up 6lbs since last visit).   Eyes:  Negative for visual  "disturbance.   Respiratory:  Negative for cough, shortness of breath and wheezing.    Cardiovascular:  Negative for chest pain, palpitations and leg swelling.   Gastrointestinal:  Negative for abdominal pain, constipation, diarrhea, nausea and vomiting.   Genitourinary:  Positive for frequency. Negative for dysuria, flank pain, hematuria and urgency.   Musculoskeletal:  Positive for arthralgias (right upper arm pain occasionally) and back pain (Bilateral leg pain/heaviness R>L). Negative for gait problem and neck pain.   Skin:  Negative for wound.   Neurological:  Negative for dizziness, syncope (no further syncope), speech difficulty, weakness, numbness and headaches.   Psychiatric/Behavioral:  Negative for dysphoric mood. The patient is not nervous/anxious.           Objective:      Vitals:    08/01/24 0905 08/01/24 0910 08/01/24 0927   BP: (!) 158/62 (!) 154/68 (!) 156/64   Pulse: 67     SpO2: 97%     Weight: 74.7 kg (164 lb 9.6 oz)     Height: 5' 4" (1.626 m)       Physical Exam  Vitals and nursing note reviewed.   Constitutional:       General: She is not in acute distress.     Appearance: Normal appearance. She is well-developed. She is not toxic-appearing.   HENT:      Head: Normocephalic and atraumatic.      Right Ear: Tympanic membrane and ear canal normal.      Left Ear: Tympanic membrane and ear canal normal.   Neck:      Vascular: No carotid bruit.   Cardiovascular:      Rate and Rhythm: Normal rate and regular rhythm.      Pulses:           Dorsalis pedis pulses are 2+ on the right side and 2+ on the left side.      Heart sounds: Murmur (2nd ICS LSB) heard.      Systolic murmur is present with a grade of 2/6.      No friction rub. No gallop.   Pulmonary:      Effort: Pulmonary effort is normal. No respiratory distress.      Breath sounds: Normal breath sounds. No wheezing or rales.   Abdominal:      Palpations: Abdomen is soft.   Musculoskeletal:      Right shoulder: No swelling or bony tenderness. " Normal range of motion.      Right upper arm: No swelling, edema, tenderness or bony tenderness.      Cervical back: Neck supple. No bony tenderness. No pain with movement or spinous process tenderness.      Right lower leg: No edema.      Left lower leg: No edema.      Right foot: No deformity.      Left foot: No deformity.   Feet:      Right foot:      Protective Sensation: 5 sites tested.  5 sites sensed.      Skin integrity: No ulcer, erythema or callus.      Toenail Condition: Right toenails are abnormally thick.      Left foot:      Protective Sensation: 5 sites tested.  5 sites sensed.      Skin integrity: No ulcer, erythema or callus.      Toenail Condition: Left toenails are abnormally thick.   Lymphadenopathy:      Cervical: No cervical adenopathy.   Skin:     General: Skin is warm and dry.      Findings: No rash.   Neurological:      General: No focal deficit present.      Mental Status: She is alert and oriented to person, place, and time.      Motor: Motor function is intact.      Gait: Gait normal.   Psychiatric:         Mood and Affect: Mood normal.           Assessment:       1. DM type 2 with diabetic dyslipidemia    2. Essential hypertension    3. Dyslipidemia    4. Abnormal CT scan, neck    5. Lumbar radiculopathy           Plan:       1. DM type 2 with diabetic dyslipidemia  -be up to 7.5%, patient admits has not really been following diabetic diet.  Discussed importance of working on diet and will increase Mounjaro dose to 7.5 mg weekly  -     Hemoglobin A1C, POCT  -      DIABETES FOOT EXAM  -     tirzepatide 7.5 mg/0.5 mL PnIj; Inject 7.5 mg into the skin every 7 days.  Dispense: 4 Pen; Refill: 5    2. Essential hypertension   -BP elevated today though she has not taking blood pressure meds yet today, has follow-up with Dr. Varner later today so advised to take her medicine before that visit    3. Dyslipidemia   -controlled on last labs    4. Abnormal CT scan, neck   -reviewed recent CT scan  of neck with possible parathyroid adenoma versus isolated enlarged lymph node.  Recommend checking calcium and intact PTH levels today    5. Lumbar radiculopathy   -scheduled for lumbar injection with Dr. Blount next week    Follow up in about 3 months (around 11/1/2024) for labs to be drawn today, Diabetes.          Counseled on age and gender appropriate medical preventative services, including cancer screenings, immunizations, overall nutritional health, need for a consistent exercise regimen and an overall push towards maintaining a vigorous and active lifestyle.      8/1/2024 Priyanka Garibay NP

## 2024-08-01 NOTE — PATIENT INSTRUCTIONS
We understand that controlling diabetes can feel overwhelming at times. We are here to offer the tools and support to help you manage your diabetes and meet your health goals. Please reference the meal planning guide below.     Diabetic Meal Planning    About this topic  Healthy eating is an important part of keeping your diabetes in control. A balanced diet along with your diabetic drugs will help you control your blood sugar. The right portions of healthy foods may help keep your sugar within your goal range. It may also help to lower or maintain your weight, manage cholesterol, the amount of fat in your blood, and blood pressure.      Image(s)    What will the results be?  Healthy eating may help you lower your blood sugar and keep it in a safe range. Keeping your blood sugar in a safe range may lower your chances for long term problems from your diabetes. You may be less likely to have damage to your kidneys, heart, eyes, or nerves.    What changes to diet are needed?  Healthy eating means:  Eating a range of foods from all food groups.  Eating the right size portions. Read the nutrition facts on each food you eat.  Eating meals and snacks at the same time each day. Do not skip a meal or snack. Skipping meals may cause your blood sugar to go too low, especially if you are on drugs for your diabetes. Skipping meals can also cause you to eat too much at the next meal.  Talk to your diabetes educator about making a personal meal plan for you. They can also help you eat the foods you like by modifying them to be healthier.    Who should use this diet?  This diet is helpful to people with high blood sugar or diabetes.    What foods are good to eat?  It is important to make a healthy meal. Eat a variety of different foods in the right portion.  Fill half of your plate with non-starchy vegetables. Non-starchy vegetables include: Broccoli, green beans, carrots, greens (chinmay, kale, mustard, turnip), onions, tomatoes,  asparagus, beets, cauliflower, celery, and cucumber. Non-starchy vegetables are high in fiber and low in carbohydrates. These will help keep you full for longer without raising your blood sugar.  Fill 1/4 of your plate with carbohydrates. Try to choose whole grain options. Whole-grain products have more fiber which will make you feel full. Carbohydrates include: Bread, rice, pasta, and starchy vegetables. Starchy vegetables include beans, potatoes, acorn squash, butternut squash, corn, and peas.  Fill 1/4 of your plate with protein. Choose low-fat cuts of meat like boneless, skinless chicken breast; pork loin; 90% lean beef; lean and skinless turkey meat; and fresh fish (not fried) such as tuna, salmon, or mackerel.  Add a low-fat or non-fat dairy product like 8 ounces (240 mL) of 1% or skim milk or 6 ounces (180 mL) of low-fat yogurt. Eat the recommended serving. Milk and yogurt have carbohydrates which raise your blood sugar.  Add a small piece of fruit or 1/2 cup (120 grams) of frozen or canned fruit. Choose canned fruits in juice or water. Fruits include apples, bananas, peaches, pears, pineapple, plums, and oranges. Eat the recommended serving. Fruit has carbohydrates which can raise your blood sugar.  Include healthy fats in your meal like olive oil, canola oil, avocado, and nuts.  Other good foods to include in your diet are:  Foods high in fiber. Adding fiber to your meals may help control your blood sugar and cholesterol levels. It may also help with weight loss and prevent belly problems. If you are younger than 50, it is recommended to get 25 to 38 grams per day. If you are older than 50, it is recommended to get 21 to 30 grams per day. Good sources of fiber include:  Nuts and seeds  Beans, peas, and other legumes  Whole-grain products  Fruits  Vegetables  Smart snacks in the right portion. Do not go too long in between meals. Ask your dietitian when you should have a snack in between your meals. Snack on  things like:  Nuts  Vegetables and low-fat dressing  Light popcorn  Low-fat cheese and crackers  1/2 sandwich    What foods should be limited or avoided?  You may need to limit the amount of some foods you eat and how often you eat them. Foods that should be limited include:  High fat or processed foods like:  Chow  Sausage  Hot dogs  Whole-fat dairy products  Potato chips  Foods high in sodium like:  Canned soups  Soy sauce and some salad dressings  Cured meats  Salted snack foods like pretzels  Olives  Fats and oils like:  Margarine  Salad dressing  Gravy  Lard or shortening  Foods high in sugar like:  Candy  Cookies  Cake  Ice cream  Table sugar  Soda  Juice drinks  Starches that are not whole grain like:  White rice  French fries  White pasta  White bread  Sugary cereals  Baked goods, pastries, croissants  Beer, wine, and mixed drinks (alcohol). Drink alcohol in moderation (1 drink per day or less for adult women and 2 drinks per day or less for adult men). Drinking alcohol can cause fluctuations in your blood sugar and interfere with how your diabetic drugs work. Talk to your doctor about how you can safely include alcohol into your diet.    What can be done to prevent this health problem?  Some people have a higher chance of developing diabetes than others. This is a life-long problem. You can still lead a normal life. Diabetes can be managed through diet, exercise, and drugs. It is important to have support from your family and friends to help you with your goals.    When do I need to call the doctor?  Blood sugar level is above 240 mg/dL for more than a day  Blood sugar level drops to less than 40 and does not respond to 15 grams of simple carbohydrate, like 4 glucose tablets or 1/2 cup (120 mL) of fruit juice  Trouble breathing  Very sleepy and trouble concentrating  Feeling very thirsty  Needing to urinate (pee) more than normal  Throw up more than once or have many loose stools  You are so sick you  cannot eat or drink  Fever over 101°F (38°C)  Questions about your diet plan  You are not feeling better in 2 to 3 days or you are feeling worse    Helpful tips  Plan ahead. Plan your meals and grocery list before going to the store. This will help you to choose foods that are good for you.  Pack a lunch. Take healthy meals and snacks with you to work.  Keep a food journal to help keep you on track. Take note of foods that keep your blood sugar in goal range. Also note foods and meals that raise or lower your blood sugar. There are apps for your phone that can help with this.  Visit a restaurant's website before eating out. You can see the menu options and nutritional facts. This way, you can see which items best fit into your diet plan. Call ahead if you have questions.    Disclaimer.This generalized information is a limited summary of diagnosis, treatment, and/or medication information. It is not meant to be comprehensive and should be used as a tool to help the user understand and/or assess potential diagnostic and treatment options. It does NOT include all information about conditions, treatments, medications, side effects, or risks that may apply to a specific patient. It is not intended to be medical advice or a substitute for the medical advice, diagnosis, or treatment of a health care provider based on the health care provider's examination and assessment of a patient's specific and unique circumstances. Patients must speak with a health care provider for complete information about their health, medical questions, and treatment options, including any risks or benefits regarding use of medications. This information does not endorse any treatments or medications as safe, effective, or approved for treating a specific patient. [employer] and its affiliates disclaim any warranty or liability relating to this information or the use thereof. The use of this information is governed by the Terms of Use,  available at Terms of Use. ©2022 [employer] and its affiliates and/or licensors. All rights reserved.

## 2024-08-05 ENCOUNTER — TELEPHONE (OUTPATIENT)
Dept: FAMILY MEDICINE | Facility: CLINIC | Age: 69
End: 2024-08-05
Payer: MEDICARE

## 2024-08-08 ENCOUNTER — HOSPITAL ENCOUNTER (OUTPATIENT)
Facility: HOSPITAL | Age: 69
Discharge: HOME | End: 2024-08-08
Attending: ANESTHESIOLOGY | Admitting: ANESTHESIOLOGY
Payer: MEDICARE

## 2024-08-08 DIAGNOSIS — M54.16 LUMBAR RADICULITIS: ICD-10-CM

## 2024-08-08 LAB
POCT GLUCOSE: 140 MG/DL (ref 70–110)
POCT GLUCOSE: 141 MG/DL (ref 70–110)

## 2024-08-08 PROCEDURE — 64483 NJX AA&/STRD TFRM EPI L/S 1: CPT | Mod: 50,,, | Performed by: ANESTHESIOLOGY

## 2024-08-08 PROCEDURE — 25000003 PHARM REV CODE 250: Performed by: ANESTHESIOLOGY

## 2024-08-08 PROCEDURE — 25500020 PHARM REV CODE 255: Performed by: ANESTHESIOLOGY

## 2024-08-08 PROCEDURE — 64483 NJX AA&/STRD TFRM EPI L/S 1: CPT | Mod: 50 | Performed by: ANESTHESIOLOGY

## 2024-08-08 PROCEDURE — 63600175 PHARM REV CODE 636 W HCPCS: Performed by: ANESTHESIOLOGY

## 2024-08-08 RX ORDER — LIDOCAINE HYDROCHLORIDE 10 MG/ML
INJECTION, SOLUTION EPIDURAL; INFILTRATION; INTRACAUDAL; PERINEURAL
Status: DISCONTINUED | OUTPATIENT
Start: 2024-08-08 | End: 2024-08-08 | Stop reason: HOSPADM

## 2024-08-08 RX ORDER — DEXAMETHASONE SODIUM PHOSPHATE 10 MG/ML
INJECTION INTRAMUSCULAR; INTRAVENOUS
Status: DISCONTINUED | OUTPATIENT
Start: 2024-08-08 | End: 2024-08-08 | Stop reason: HOSPADM

## 2024-08-08 RX ORDER — ALPRAZOLAM 1 MG/1
1 TABLET, ORALLY DISINTEGRATING ORAL ONCE AS NEEDED
Status: COMPLETED | OUTPATIENT
Start: 2024-08-08 | End: 2024-08-08

## 2024-08-08 RX ORDER — LIDOCAINE HYDROCHLORIDE 10 MG/ML
1 INJECTION, SOLUTION EPIDURAL; INFILTRATION; INTRACAUDAL; PERINEURAL ONCE
Status: DISCONTINUED | OUTPATIENT
Start: 2024-08-08 | End: 2024-08-08 | Stop reason: HOSPADM

## 2024-08-08 RX ORDER — BUPIVACAINE HYDROCHLORIDE 2.5 MG/ML
INJECTION, SOLUTION EPIDURAL; INFILTRATION; INTRACAUDAL
Status: DISCONTINUED | OUTPATIENT
Start: 2024-08-08 | End: 2024-08-08 | Stop reason: HOSPADM

## 2024-08-08 RX ORDER — SODIUM CHLORIDE, SODIUM LACTATE, POTASSIUM CHLORIDE, CALCIUM CHLORIDE 600; 310; 30; 20 MG/100ML; MG/100ML; MG/100ML; MG/100ML
INJECTION, SOLUTION INTRAVENOUS CONTINUOUS
Status: DISCONTINUED | OUTPATIENT
Start: 2024-08-08 | End: 2024-08-08 | Stop reason: HOSPADM

## 2024-08-08 RX ADMIN — ALPRAZOLAM 1 MG: 1 TABLET, ORALLY DISINTEGRATING ORAL at 10:08

## 2024-08-08 NOTE — OP NOTE
PROCEDURE DATE: 8/8/2024    PROCEDURE: Bilateral L4-5 transforaminal epidural steroid injection under fluoroscopy    DIAGNOSIS: Lumbar radiculitis    Post op diagnosis: Same    PHYSICIAN: Christopher Blount MD    MEDICATIONS INJECTED:  Dexamethasone 5mg (0.5ml) and 1.5ml 0.25% bupivicaine at each nerve root.     LOCAL ANESTHETIC INJECTED:  Lidocaine 1%. 2 ml per site.    SEDATION MEDICATIONS: RN IV sedation    ESTIMATED BLOOD LOSS:  None    COMPLICATIONS:  None    TECHNIQUE:   A time-out was taken to identify patient and procedure side prior to starting the procedure. The patient was placed in a prone position, prepped and draped in the usual sterile fashion using ChloraPrep and sterile towels.  The area to be injected was determined under fluoroscopic guidance in AP and oblique view.  Local anesthetic was given by raising a wheal and going down to the hub of a 25-gauge 1.5 inch needle.  In oblique view, a 3.5 inch 22-gauge bent-tip spinal needle was introduced towards 6 oclock position of the pedicle of each above named nerve root level.  The needle was walked medially then hinged into the neural foramen and position was confirmed in AP and lateral views.  1ml contrast dye was injected to confirm appropriate placement and that there was no vascular uptake.  After negative aspiration for blood or CSF, the medication was then injected. This was performed at the bilateral L4-5 level(s). The patient tolerated the procedure well.    The patient was monitored after the procedure.  Patient was given post procedure and discharge instructions to follow at home. The patient was discharged in a stable condition.

## 2024-08-08 NOTE — DISCHARGE SUMMARY
[facility] - Periop Services  Discharge Note  Short Stay    Procedure(s) (LRB):  Injection,steroid,epidural,transforaminal approach l4-5 (Bilateral)      OUTCOME: Patient tolerated treatment/procedure well without complication and is now ready for discharge.    DISPOSITION: Home or Self Care    FINAL DIAGNOSIS:  <principal problem not specified>    FOLLOWUP: In clinic    DISCHARGE INSTRUCTIONS:    Discharge Procedure Orders   Notify your health care provider if you experience any of the following:  temperature >100.4     Notify your health care provider if you experience any of the following:  severe uncontrolled pain     Notify your health care provider if you experience any of the following:  redness, tenderness, or signs of infection (pain, swelling, redness, odor or green/yellow discharge around incision site)     Activity as tolerated        TIME SPENT ON DISCHARGE: 30 minutes

## 2024-08-08 NOTE — PLAN OF CARE
Tolerated procedure well. Denies pain at this time. Blood sugar 141 @ 1103. Transferred out of facility via wheelchair to [facility] without incident. Discharge instructions in hand upon departure.

## 2024-08-09 VITALS
WEIGHT: 164.69 LBS | HEART RATE: 58 BPM | HEIGHT: 64 IN | BODY MASS INDEX: 28.12 KG/M2 | DIASTOLIC BLOOD PRESSURE: 65 MMHG | TEMPERATURE: 98 F | SYSTOLIC BLOOD PRESSURE: 140 MMHG | OXYGEN SATURATION: 96 % | RESPIRATION RATE: 18 BRPM

## 2024-08-16 DIAGNOSIS — K21.9 GASTROESOPHAGEAL REFLUX DISEASE WITHOUT ESOPHAGITIS: ICD-10-CM

## 2024-08-16 RX ORDER — OMEPRAZOLE 20 MG/1
20 CAPSULE, DELAYED RELEASE ORAL DAILY
Qty: 90 CAPSULE | Refills: 3 | Status: SHIPPED | OUTPATIENT
Start: 2024-08-16

## 2024-08-16 NOTE — TELEPHONE ENCOUNTER
----- Message from Viktoria Blue MA sent at 8/16/2024 10:05 AM CDT -----  Patient is calling requesting refills on Omeprazole    CHRISTOPH Hu  Pt: [phone]  -DN

## 2024-08-16 NOTE — TELEPHONE ENCOUNTER
Pt is needing a refill on her Omeprazole. Last office visit 08/01/2024. Next office visit 11/19/2024.

## 2024-10-07 DIAGNOSIS — M54.16 LUMBAR RADICULOPATHY: ICD-10-CM

## 2024-10-07 DIAGNOSIS — E78.5 DM TYPE 2 WITH DIABETIC DYSLIPIDEMIA: ICD-10-CM

## 2024-10-07 DIAGNOSIS — E11.69 DM TYPE 2 WITH DIABETIC DYSLIPIDEMIA: ICD-10-CM

## 2024-10-07 DIAGNOSIS — I10 ESSENTIAL HYPERTENSION: ICD-10-CM

## 2024-10-07 RX ORDER — MELOXICAM 15 MG/1
15 TABLET ORAL DAILY
Qty: 90 TABLET | Refills: 3 | Status: SHIPPED | OUTPATIENT
Start: 2024-10-07

## 2024-10-07 RX ORDER — LISINOPRIL 20 MG/1
40 TABLET ORAL DAILY
Qty: 180 TABLET | Refills: 3 | Status: SHIPPED | OUTPATIENT
Start: 2024-10-07 | End: 2025-10-07

## 2024-10-07 RX ORDER — METOPROLOL SUCCINATE 50 MG/1
50 TABLET, EXTENDED RELEASE ORAL DAILY
Qty: 90 TABLET | Refills: 3 | Status: SHIPPED | OUTPATIENT
Start: 2024-10-07

## 2024-10-07 NOTE — TELEPHONE ENCOUNTER
----- Message from Med Assistant Schofield sent at 10/7/2024  9:36 AM CDT -----  Pt needs refills on all of medications [facility] [address]     Pt # [phone]

## 2024-10-08 ENCOUNTER — TELEPHONE (OUTPATIENT)
Dept: FAMILY MEDICINE | Facility: CLINIC | Age: 69
End: 2024-10-08
Payer: MEDICARE

## 2024-10-08 NOTE — TELEPHONE ENCOUNTER
----- Message from Priyanka sent at 10/8/2024  2:12 PM CDT -----  Contact: [facility]  Pt might have diabetes and is not on a statin. Pt advocate [facility] @[phone]

## 2024-10-24 ENCOUNTER — OFFICE VISIT (OUTPATIENT)
Dept: PAIN MEDICINE | Facility: CLINIC | Age: 69
End: 2024-10-24
Payer: MEDICARE

## 2024-10-24 VITALS
DIASTOLIC BLOOD PRESSURE: 83 MMHG | HEART RATE: 72 BPM | WEIGHT: 164.69 LBS | SYSTOLIC BLOOD PRESSURE: 187 MMHG | BODY MASS INDEX: 28.12 KG/M2 | HEIGHT: 64 IN

## 2024-10-24 DIAGNOSIS — M48.062 SPINAL STENOSIS OF LUMBAR REGION WITH NEUROGENIC CLAUDICATION: Primary | ICD-10-CM

## 2024-10-24 DIAGNOSIS — M51.362 DEGENERATION OF INTERVERTEBRAL DISC OF LUMBAR REGION WITH DISCOGENIC BACK PAIN AND LOWER EXTREMITY PAIN: ICD-10-CM

## 2024-10-24 DIAGNOSIS — M47.896 OTHER SPONDYLOSIS, LUMBAR REGION: ICD-10-CM

## 2024-10-24 DIAGNOSIS — M54.16 LUMBAR RADICULOPATHY: ICD-10-CM

## 2024-10-24 PROCEDURE — 3077F SYST BP >= 140 MM HG: CPT | Mod: CPTII,S$GLB,, | Performed by: PHYSICIAN ASSISTANT

## 2024-10-24 PROCEDURE — 3066F NEPHROPATHY DOC TX: CPT | Mod: CPTII,S$GLB,, | Performed by: PHYSICIAN ASSISTANT

## 2024-10-24 PROCEDURE — 1125F AMNT PAIN NOTED PAIN PRSNT: CPT | Mod: CPTII,S$GLB,, | Performed by: PHYSICIAN ASSISTANT

## 2024-10-24 PROCEDURE — 3079F DIAST BP 80-89 MM HG: CPT | Mod: CPTII,S$GLB,, | Performed by: PHYSICIAN ASSISTANT

## 2024-10-24 PROCEDURE — 4010F ACE/ARB THERAPY RXD/TAKEN: CPT | Mod: CPTII,S$GLB,, | Performed by: PHYSICIAN ASSISTANT

## 2024-10-24 PROCEDURE — 3061F NEG MICROALBUMINURIA REV: CPT | Mod: CPTII,S$GLB,, | Performed by: PHYSICIAN ASSISTANT

## 2024-10-24 PROCEDURE — 99214 OFFICE O/P EST MOD 30 MIN: CPT | Mod: S$GLB,,, | Performed by: PHYSICIAN ASSISTANT

## 2024-10-24 PROCEDURE — 99999 PR PBB SHADOW E&M-EST. PATIENT-LVL III: CPT | Mod: PBBFAC,,, | Performed by: PHYSICIAN ASSISTANT

## 2024-10-24 PROCEDURE — 3051F HG A1C>EQUAL 7.0%<8.0%: CPT | Mod: CPTII,S$GLB,, | Performed by: PHYSICIAN ASSISTANT

## 2024-10-24 PROCEDURE — 1159F MED LIST DOCD IN RCRD: CPT | Mod: CPTII,S$GLB,, | Performed by: PHYSICIAN ASSISTANT

## 2024-10-24 PROCEDURE — 3008F BODY MASS INDEX DOCD: CPT | Mod: CPTII,S$GLB,, | Performed by: PHYSICIAN ASSISTANT

## 2024-10-24 NOTE — PROGRESS NOTES
Referring Physician: No ref. provider found    PCP: Priyanka Garibay NP      CC: low back and leg pain    Interval History:  Rhonda Mojica is a 69 y.o. female with chronic low back and leg pain who presents today for f/u s/p bilateral TF YAW at L4-5. Reports >50% relief of low back and bilateral leg pain. She does have soreness in left LE due to a fall in bathtub. She did not take her blood pressure medicine today. She is tried home exercises and physical therapy with minimal benefit.  She takes gabapentin NSAIDs with mild benefits.  MRI was performed in December 2022 which shows severe stenosis at L4-5. Denies any b/b changes or LE weakness. Pain today is rated 10/10.  Pt has been seen in the clinic before, however pt is new to me.     History below per Dr. Blount    HPI:   Rhonda Mojica is a 69 y.o. female  Referred to us for low back and leg pain.  Pain has worsened over the past 4 years.  No recent traumatic incident.  She does state having a fall due to pain in her legs.  It is a constant intermittent aching, sharp pain in her lower back.  Pain radiates to her bilateral calves.  Pain worsens with sitting, walking getting up.  Pain improves with rest.  She is tried home exercises and physical therapy with minimal benefit.  She takes gabapentin NSAIDs with mild benefits.  MRI was performed in December 2022 which shows severe stenosis at L4-5.  She has not had any interventions.  She did not seem side surgery.  She denies any worsening weakness.  No bowel bladder changes.    ROS:  CONSTITUTIONAL: No fevers, chills, night sweats, wt. loss, appetite changes  SKIN: no rashes or itching  ENT: No headaches, head trauma, vision changes, or eye pain  LYMPH NODES: None noticed   CV: No chest pain, palpitations.   RESP: No shortness of breath, dyspnea on exertion, cough, wheezing, or hemoptysis  GI: No nausea, emesis, diarrhea, constipation, melena, hematochezia, pain.    : No dysuria, hematuria, urgency, or frequency  "  HEME: No easy bruising, bleeding problems  PSYCHIATRIC: No depression, anxiety, psychosis, hallucinations.  NEURO: No seizures, memory loss, dizziness or difficulty sleeping  MSK: +HPI      Past Medical History:   Diagnosis Date    DDD (degenerative disc disease), lumbar     DM type 2 with diabetic dyslipidemia     Dyslipidemia     GERD (gastroesophageal reflux disease)     Hypertension      Past Surgical History:   Procedure Laterality Date    TRANSFORAMINAL EPIDURAL INJECTION OF STEROID Bilateral 8/8/2024    Procedure: Injection,steroid,epidural,transforaminal approach;  Surgeon: Christopher Blount MD;  Location: [facility] OR;  Service: Pain Management;  Laterality: Bilateral;     No family history on file.  Social History     Socioeconomic History    Marital status:    Tobacco Use    Smoking status: Never     Passive exposure: Never    Smokeless tobacco: Never   Substance and Sexual Activity    Alcohol use: Never    Drug use: Never     Social Drivers of Health     Stress: No Stress Concern Present (9/17/2019)    [facility] Issue of Occupational Health - Occupational Stress Questionnaire     Feeling of Stress : Not at all         Medications/Allergies: See med card    Vitals:    10/24/24 0818   BP: (!) 187/83   Pulse: 72   Weight: 74.7 kg (164 lb 10.9 oz)   Height: 5' 4" (1.626 m)   PainSc: 10-Worst pain ever   PainLoc: Back         Physical exam:    GENERAL: A and O x3, the patient appears well groomed and is in no acute distress.  Skin: No rashes or obvious lesions  HEENT: normocephalic, atraumatic  CARDIOVASCULAR: RRR  LUNGS: non labored breathing  ABDOMEN: soft, nontender   UPPER EXTREMITIES: Normal alignment, normal range of motion, no atrophy, no skin changes,  hair growth and nail growth normal and equal bilaterally. No swelling, no tenderness.    LOWER EXTREMITIES:  Normal alignment, normal range of motion, no atrophy, no skin changes,  hair growth and nail growth normal and equal bilaterally. No " swelling, no tenderness.    LUMBAR SPINE  Lumbar spine: ROM is  Limited with flexion extension and oblique extension with  moderate increased pain.    Ascencion's test causes no increased pain on either side.    Supine straight leg raise is negative bilaterally.   Positive femoral stretch  Internal and external rotation of the hip causes no increased pain on either side.  Myofascial exam: No tenderness to palpation across lumbar paraspinous muscles.    MENTAL STATUS: normal orientation, speech, language, and fund of knowledge for social situation.  Emotional state appropriate.    MOTOR: Tone and bulk: normal bilateral upper and lower Strength: normal   SENSATION: Light touch and pinprick intact bilaterally  REFLEXES: normal, symmetric, nonbrisk.  Toes down, no clonus. No hoffmans.  GAIT: normal rise, base, steps, and arm swing.        Imaging:  MRI L-spine 12/2022  At L2-L3 there is marked disc space narrowing with prominent endplate bony spurring and associated annular disc bulging that produces moderate concentric narrowing of the dural sac. There is also mild-to-moderate narrowing of the left neural foramen at this level.     At L3-L4 there is moderate diffuse annular disc bulging and there is mild bony overgrowth of the facet joints and thickening of the ligamentum flavum that produces moderately severe central canal stenosis and also narrowing of the lateral recess bilaterally. There is also mild bilateral foraminal narrowing.     At L4-L5 there is severe central canal stenosis due to extensive diffuse annular disc bulging and moderate bony overgrowth of the facet joints and extensive thickening of the ligamentum flavum. There is also moderate left and severe right foraminal narrowing at this level. The far right lateral disc protrusion appears to contribute to the foraminal stenosis.     At L5-S1 the disc appears within normal limits. There is no significant disc bulging or spinal canal or foraminal  encroachment.       Assessment:  Rhonda Mojica is a 69 y.o. female with   1. Spinal stenosis of lumbar region with neurogenic claudication    2. Degeneration of intervertebral disc of lumbar region with discogenic back pain and lower extremity pain    3. Other spondylosis, lumbar region            Plan:  I have stressed the importance of physical activity and exercise to improve overall health  Reviewed pertinent imaging and records with patient  Monitor progress from lumbar epidural steroid injection to the Bilateral L4-5 level(s).    May consider Veriflex procedure in future     Follow-up prn

## 2024-10-29 ENCOUNTER — TELEPHONE (OUTPATIENT)
Dept: FAMILY MEDICINE | Facility: CLINIC | Age: 69
End: 2024-10-29
Payer: MEDICARE

## 2024-10-29 DIAGNOSIS — R59.0 LOCALIZED ENLARGED LYMPH NODES: Primary | ICD-10-CM

## 2024-11-05 ENCOUNTER — TELEPHONE (OUTPATIENT)
Dept: FAMILY MEDICINE | Facility: CLINIC | Age: 69
End: 2024-11-05
Payer: MEDICARE

## 2024-11-05 DIAGNOSIS — Z79.899 ENCOUNTER FOR LONG-TERM (CURRENT) USE OF MEDICATIONS: Primary | ICD-10-CM

## 2024-11-05 DIAGNOSIS — E78.5 DYSLIPIDEMIA: ICD-10-CM

## 2024-11-05 DIAGNOSIS — E78.5 DM TYPE 2 WITH DIABETIC DYSLIPIDEMIA: ICD-10-CM

## 2024-11-05 DIAGNOSIS — E11.69 DM TYPE 2 WITH DIABETIC DYSLIPIDEMIA: ICD-10-CM

## 2024-11-05 NOTE — TELEPHONE ENCOUNTER
Spoke with pt in regards to pre-visit labs. Verbalized that we have placed lab order for you to have done before your upcoming appointment on 11/19/2024. Verbalized that the lab order are placed through [facility], so they can come into the office anytime, no appointment necessary. Just make sure that you are fasting for you labs. Pt acknowledge understanding.

## 2024-11-07 ENCOUNTER — HOSPITAL ENCOUNTER (OUTPATIENT)
Dept: RADIOLOGY | Facility: HOSPITAL | Age: 69
Discharge: HOME | End: 2024-11-07
Attending: NURSE PRACTITIONER
Payer: MEDICARE

## 2024-11-07 DIAGNOSIS — R59.0 LOCALIZED ENLARGED LYMPH NODES: ICD-10-CM

## 2024-11-07 LAB
CREAT SERPL-MCNC: 1.2 MG/DL (ref 0.5–1.4)
SAMPLE: NORMAL

## 2024-11-07 PROCEDURE — 25500020 PHARM REV CODE 255: Mod: PO | Performed by: NURSE PRACTITIONER

## 2024-11-07 PROCEDURE — 70491 CT SOFT TISSUE NECK W/DYE: CPT | Mod: 26,,, | Performed by: RADIOLOGY

## 2024-11-07 PROCEDURE — 70491 CT SOFT TISSUE NECK W/DYE: CPT | Mod: TC,PO

## 2024-11-07 RX ADMIN — IOHEXOL 100 ML: 350 INJECTION, SOLUTION INTRAVENOUS at 09:11

## 2024-11-09 LAB
ALBUMIN SERPL-MCNC: 4.2 G/DL (ref 3.6–5.1)
ALBUMIN/GLOB SERPL: 1.6 (CALC) (ref 1–2.5)
ALP SERPL-CCNC: 108 U/L (ref 37–153)
ALT SERPL-CCNC: 14 U/L (ref 6–29)
AST SERPL-CCNC: 14 U/L (ref 10–35)
BILIRUB SERPL-MCNC: 0.5 MG/DL (ref 0.2–1.2)
BUN SERPL-MCNC: 20 MG/DL (ref 7–25)
BUN/CREAT SERPL: 19 (CALC) (ref 6–22)
CALCIUM SERPL-MCNC: 9.2 MG/DL (ref 8.6–10.4)
CHLORIDE SERPL-SCNC: 107 MMOL/L (ref 98–110)
CHOLEST SERPL-MCNC: 144 MG/DL
CHOLEST/HDLC SERPL: 3.8 (CALC)
CO2 SERPL-SCNC: 24 MMOL/L (ref 20–32)
CREAT SERPL-MCNC: 1.08 MG/DL (ref 0.6–1)
EGFR: 55 ML/MIN/1.73M2
GLOBULIN SER CALC-MCNC: 2.6 G/DL (CALC) (ref 1.9–3.7)
GLUCOSE SERPL-MCNC: 102 MG/DL (ref 65–99)
HBA1C MFR BLD: 8.2 % OF TOTAL HGB
HDLC SERPL-MCNC: 38 MG/DL
LDLC SERPL CALC-MCNC: 85 MG/DL (CALC)
NONHDLC SERPL-MCNC: 106 MG/DL (CALC)
POTASSIUM SERPL-SCNC: 4.3 MMOL/L (ref 3.5–5.3)
PROT SERPL-MCNC: 6.8 G/DL (ref 6.1–8.1)
SODIUM SERPL-SCNC: 142 MMOL/L (ref 135–146)
TRIGL SERPL-MCNC: 109 MG/DL

## 2024-11-15 DIAGNOSIS — E11.69 DM TYPE 2 WITH DIABETIC DYSLIPIDEMIA: ICD-10-CM

## 2024-11-15 DIAGNOSIS — E78.5 DM TYPE 2 WITH DIABETIC DYSLIPIDEMIA: ICD-10-CM

## 2024-11-15 NOTE — TELEPHONE ENCOUNTER
----- Message from Iris sent at 11/15/2024 10:02 AM CST -----  Refill on [facility]   [facility]   [phone]

## 2024-11-18 ENCOUNTER — PATIENT OUTREACH (OUTPATIENT)
Dept: ADMINISTRATIVE | Facility: HOSPITAL | Age: 69
End: 2024-11-18
Payer: MEDICARE

## 2024-11-18 ENCOUNTER — PATIENT OUTREACH (OUTPATIENT)
Dept: ADMINISTRATIVE | Facility: OTHER | Age: 69
End: 2024-11-18
Payer: MEDICARE

## 2024-11-18 DIAGNOSIS — Z71.89 ENCOUNTER FOR COUNSELING FOR CARE MANAGEMENT OF PATIENT WITH CHRONIC CONDITIONS AND COMPLEX HEALTH NEEDS USING NURSE-BASED MODEL: Primary | ICD-10-CM

## 2024-11-18 NOTE — LETTER
AUTHORIZATION FOR RELEASE OF   CONFIDENTIAL INFORMATION    Dear Dr. Rod,    We are seeing Rhonda Mojica, date of birth 1955, in the clinic at [facility]. Priyanka Garibay NP is the patient's PCP. Rhonda Mojica has an outstanding lab/procedure at the time we reviewed her chart. In order to help keep her health information updated, she has authorized us to request the following medical record(s):                                              ( X )  EYE EXAM ( 8-15-24 Report or Most Recent )              Please fax records to [facility], Melerine, Linda T., NP,  at [phone] or email to [email or web address].      If you have any questions, please contact     Vinayak HARMAN  Clinical Care Coordinator    [facility]  [phone] (Phone)  [phone] (Fax)    Patient Name: Rhonda Mojica  : 1955  Patient Phone #: [phone]                   Rhonda Mojica  MRN: [id number]  : 1955  Age: 68 y.o.  Sex: female         Patient/Legal Guardian Signature  This signature was collected at 2024    Rhonda Mojica     Self  _______________________________   Printed Name/Relationship to Patient      Consent for Examination and Treatment: I hereby authorize the providers and employees of [facility] ([facility]) to provide medical treatment/services which includes, but is not limited to, performing and administering tests and diagnostic procedures that are deemed necessary, including, but not limited to, imaging examinations, blood tests and other laboratory procedures as may be required by the hospital, clinic, or may be ordered by my physician(s) or persons working under the general and/or special instructions of my physician(s).      I understand and agree that this consent covers all authorized persons, including but not limited to physicians, residents, nurse practitioners, physicians' assistants, specialists, consultants, student nurses,  and independently contracted physicians, who are called upon by the physician in charge, to carry out the diagnostic procedures and medical or surgical treatment.     I hereby authorize [facility] to retain or dispose of any specimens or tissue, should there be such remaining from any test or procedure.     I hereby authorize and give consent for [facility] providers and employees to take photographs, images or videotapes of such diagnostic, surgical or treatment procedures of Patient as may be required by [facility] or as may be ordered by a physician. I further acknowledge and agree that [facility] may use cameras or other devices for patient monitoring.     I am aware that the practice of medicine is not an exact science, and I acknowledge that no guarantees have been made to me as to the outcome of any tests, procedures or treatment.     Authorization for Release of Information: I understand that my insurance company and/or their agents may need information necessary to make determinations about payment/reimbursement. I hereby provide authorization to release to all insurance companies, their successors, assignees, other parties with whom they may have contracted, or others acting on their behalf, that are involved with payment for any hospital and/or clinic charges incurred by the patient, any information that they request and deem necessary for payment/reimbursement, and/or quality review.  I further authorize the release of my health information to physicians or other health care practitioners on staff who are involved in my health care now and in the future, and to other health care providers, entities, or institutions for the purpose of my continued care and treatment, including referrals.     REGISTRATION AUTHORIZATION  Form No. 80079 (Rev. 7/13/2022)       Medicare Patient's Certification and Authorization to Release Information and Payment Request:  I certify that the information given by me in applying for payment  under Title XVIII of the Social Security Act is correct. I authorize any alcaraz of medical or other information about me to release to the Social SecurityAdministration, or its intermediaries or carriers, any information needed for this or a related Medicare claim. I request that payment of authorized benefits be made on my behalf.     Assignment of Insurance Benefits:   I hereby authorize any and all insurance companies, health plans, defined   benefit plans, health insurers or any entity that is or may be responsible for payment of my medical expenses to pay all hospital and medical benefits now due, and to become due and payable to me under any hospital benefits, sick benefits, injury benefits or any other benefit for services rendered to me, including Major Medical Benefits, direct to [facility] and all independently contracted physicians. I assign any and all rights that I may have against any and all insurance companies, health plans, defined benefit plans, health insurers or any entity that is or may be responsible for payment of my medical expenses, including, but not limited to any right to appeal a denial of a claim, any right to bring any action, lawsuit, administrative proceeding, or other cause of action on my behalf. I specifically assign my right to pursue litigation against any and all insurance companies, health plans, defined benefit plans, health insurers or any entity that is or may be responsible for payment of my medical expenses based upon a refusal to pay charges.            E. Valuables: It is understood and agreed that [facility] is not liable for the damage to or loss of any money, jewelry,   documents, dentures, eye glasses, hearing aids, prosthetics, or other property of value.     F. Computer Equipment: I understand and agree that should I choose to use computer equipment owned by [facility] or if I choose to access the Internet via [facility]s network, I do so at my own risk. [facility] is not  responsible for any damage to my computer equipment or to any damages of any type that might arise from my loss of equipment or data.     G. Acceptance of Financial Responsibility:  I agree that in consideration of the services and   supplies that have been   or will be furnished to the patient, I am hereby obligated to pay all charges made for or on the account of the patient according to the standard rates (in effect at the time the services and supplies are delivered) established by [facility], including its Patient Financial Assistance Policy to the extent it is applicable. I understand that I am responsible for all charges, or portions thereof, not covered by insurance or other sources. Patient refunds will be distributed only after balances at all [facility] facilities are paid.     H. Communication Authorization:  I hereby authorize [facility] and its representatives, along with any billing service   or  who may work on their behalf, to contact me on   my cell phone and/or home phone using pre- recorded messages, artificial voice messages, automatic telephone dialing devices or other computer assisted technology, or by electronic      mail, text messaging, or by any other form of electronic communication. This includes, but is not limited to, appointment reminders, yearly physical exam reminders, preventive care reminders, patient campaigns, welcome calls, and calls about account balances on my account or any account on which I am listed as a guarantor. I understand I have the right to opt out of these communications at any time.      Relationship  Between  Facility and  Provider:      I understand that some, but not all, providers furnishing services to the patient are not employees or agents of [facility]. The patient is under the care and supervision of his/her attending physician, and it is the responsibility of the facility and its nursing staff to carry out the instructions of such physicians. It is  the responsibility of the patient's physician/designee to obtain the patient's informed consent, when required, for medical or surgical treatment, special diagnostic or therapeutic procedures, or hospital services rendered for the patient under the special instructions of the physician/designee.     REGISTRATION AUTHORIZATION  Form No. 19087 (Rev. 7/13/2022)      Notice of Privacy Practices: I acknowledge I have received a copy of [facility]'s Notice of Privacy Practices.     Facility  Directory: I have discussed with the organization my desire to be either included or excluded  in the facility directory in the event of my being an inpatient at an [facility] facility. I understand that if my choice is to opt-out of being identified in the facility directory that the facility will not provide any information about me such as my condition (e.g. fair, stable, etc.) or my location in the facility (e.g., room number, department).     Immunizations: [facility] shares immunization information with state sponsored health departments to help you and your doctor keep track of your immunization records. By signing, you consent to have this information shared with the health department in your state:                                [place] - LINKS ([place] Immunization Network for Kids Statewide)                                [place] - MIIX ([place] Immunization Information eXchange)                                [place] - ImmPRINT (Immunization Patient Registry with Integrated Technology)     TERM: This authorization is valid for this and subsequent care/treatment I receive at [facility] and will remain valid unless/until revoked in writing by me.     [facility]: As used in this document, [facility] means all [facility] owned and managed facilities, including, but not limited to, all health centers, surgery centers, clinics, urgent care centers, and hospitals.         [facility] complies with applicable  Federal civil rights laws and does not discriminate on the basis of race, color, national origin, age, disability, or sex.  ATENCIÓN: si habla davion, tiene a blakely disposición servicios gratuitos de asistencia lingüística. Spencer al [phone].  CHÚ Ý: N?u b?n nói Ti?ng Vi?t, có các d?ch v? h? tr? ngôn ng? mi?n phí dành cho b?n. G?i s? [phone].        REGISTRATION AUTHORIZATION  Form No. 72408 (Rev. 7/13/2022)

## 2024-11-18 NOTE — PROGRESS NOTES
CHW - Initial Contact    This Community Health Worker completed OR updated the Social Determinant of Health questionnaire with patient via telephone today.    Pt identified barriers of most importance are: Spoke to the pt and she notified me she's in need of info on medication and financial assistance. SDOH questions answered. I will follow up in two weeks.    Referrals were put through [facility] - no: none  Support and Services:   Other information discussed the patient needs / wants help with: Spoke to the pt and she notified me she's in need of info on medication and financial assistance. SDOH questions answered. I will follow up in two weeks.    Follow up required:   Initial Outreach - Due: 12/2/2024

## 2024-11-18 NOTE — PROGRESS NOTES
Care Coordination Encounter Details:       MyChart Portal Status:         [x]  Reviewed MyChart Portal Status offered / enrolled if applicable        Additional Notes:     MyChart Outcomes: Pt is enrolled & active          Updates Requested / Reviewed:                 Health Maintenance Screening(s) Due:      Health Maintenance Topics Overdue:      VBHM Score: 1     Uncontrolled BP    Influenza Vaccine  Tetanus Vaccine  Shingles/Zoster Vaccine  RSV Vaccine                  Health Maintenance Topic(s) Outreach Outcomes & Actions Taken:    Eye Exam - Outreach Outcomes & Actions Taken  : External Records Requested & Care Team Updated if Applicable    Blood Pressure - Outreach Outcomes & Actions Taken  : Patient Will Call Back or Send Portal Message with Reading         Additional Notes:             Chronic Disease Management:     Diabetes Measures        Lab Results   Component Value Date    HGBA1C 8.2 (H) 11/08/2024           [x]  Reviewed chart for active Diabetes diagnosis     [x]  Scheduled necessary follow up appointments if needed         Additional Notes:  Has a PCP appt scheduled 11-19-24.           Hypertension Measures        BP Readings from Last 1 Encounters:   10/24/24 (!) 187/83           [x]  Reviewed chart for active Hypertension diagnosis     []  Reviewed & documented Home BP Cuff     []  Documented a Remote BP if needed & applicable     []  Scheduled necessary follow up appointments with Primary Care if needed         Additional Notes:  Has a PCP appt scheduled 11-19-24.         Provider Team Continuity:     Last PCP Visit Date: 8/1/2024          [x]  Reviewed Primary Care Provider Visits, Annual Wellness Visit, and Future          Appointments to ensure appointments have been scheduled and/or           completed        Additional Notes:  PCP appt already scheduled.  Declined AWV.           Social Determinants of Health          [x]  Reviewed, completed, and/or updated the following sections:                   Food Insecurity, Transportation Needs, Financial Resource Strain,                 Tobacco Use        Additional Notes:             Care Management, Digital Medicine, and/or Education Referrals    OPCM Risk Score: 45.1         Next Steps - Referral Actions: Referral place for OPCM to CHW for SDOH Food Insecurity & Transportation Needs if applicable , Digital Medicine Outcomes and Actions Taken: Pt Declined or Not Eligible, and Declined Diabetic Education RFL.        Additional Notes:

## 2024-11-20 ENCOUNTER — OFFICE VISIT (OUTPATIENT)
Dept: FAMILY MEDICINE | Facility: CLINIC | Age: 69
End: 2024-11-20
Payer: MEDICARE

## 2024-11-20 VITALS
WEIGHT: 166 LBS | HEIGHT: 64 IN | BODY MASS INDEX: 28.34 KG/M2 | OXYGEN SATURATION: 96 % | DIASTOLIC BLOOD PRESSURE: 66 MMHG | HEART RATE: 63 BPM | SYSTOLIC BLOOD PRESSURE: 162 MMHG

## 2024-11-20 DIAGNOSIS — N18.31 STAGE 3A CHRONIC KIDNEY DISEASE: ICD-10-CM

## 2024-11-20 DIAGNOSIS — E11.69 DM TYPE 2 WITH DIABETIC DYSLIPIDEMIA: Primary | ICD-10-CM

## 2024-11-20 DIAGNOSIS — Z12.31 SCREENING MAMMOGRAM, ENCOUNTER FOR: ICD-10-CM

## 2024-11-20 DIAGNOSIS — I10 ESSENTIAL HYPERTENSION: ICD-10-CM

## 2024-11-20 DIAGNOSIS — F32.A MILD DEPRESSION: ICD-10-CM

## 2024-11-20 DIAGNOSIS — E78.5 DYSLIPIDEMIA: ICD-10-CM

## 2024-11-20 DIAGNOSIS — R55 SYNCOPE AND COLLAPSE: ICD-10-CM

## 2024-11-20 DIAGNOSIS — M54.16 LUMBAR RADICULOPATHY: ICD-10-CM

## 2024-11-20 DIAGNOSIS — E78.5 DM TYPE 2 WITH DIABETIC DYSLIPIDEMIA: Primary | ICD-10-CM

## 2024-11-20 DIAGNOSIS — R93.89 ABNORMAL CT SCAN, NECK: ICD-10-CM

## 2024-11-20 LAB
EKG 12-LEAD: NORMAL
PR INTERVAL: 210
PRT AXES: NORMAL
QRS DURATION: 114
QT/QTC: NORMAL
VENTRICULAR RATE: 65

## 2024-11-20 PROCEDURE — 93000 ELECTROCARDIOGRAM COMPLETE: CPT | Mod: S$GLB,,, | Performed by: NURSE PRACTITIONER

## 2024-11-20 PROCEDURE — 3061F NEG MICROALBUMINURIA REV: CPT | Mod: CPTII,S$GLB,, | Performed by: NURSE PRACTITIONER

## 2024-11-20 PROCEDURE — 4010F ACE/ARB THERAPY RXD/TAKEN: CPT | Mod: CPTII,S$GLB,, | Performed by: NURSE PRACTITIONER

## 2024-11-20 PROCEDURE — 3288F FALL RISK ASSESSMENT DOCD: CPT | Mod: CPTII,S$GLB,, | Performed by: NURSE PRACTITIONER

## 2024-11-20 PROCEDURE — 1125F AMNT PAIN NOTED PAIN PRSNT: CPT | Mod: CPTII,S$GLB,, | Performed by: NURSE PRACTITIONER

## 2024-11-20 PROCEDURE — 3078F DIAST BP <80 MM HG: CPT | Mod: CPTII,S$GLB,, | Performed by: NURSE PRACTITIONER

## 2024-11-20 PROCEDURE — 1100F PTFALLS ASSESS-DOCD GE2>/YR: CPT | Mod: CPTII,S$GLB,, | Performed by: NURSE PRACTITIONER

## 2024-11-20 PROCEDURE — 3052F HG A1C>EQUAL 8.0%<EQUAL 9.0%: CPT | Mod: CPTII,S$GLB,, | Performed by: NURSE PRACTITIONER

## 2024-11-20 PROCEDURE — 3066F NEPHROPATHY DOC TX: CPT | Mod: CPTII,S$GLB,, | Performed by: NURSE PRACTITIONER

## 2024-11-20 PROCEDURE — 99214 OFFICE O/P EST MOD 30 MIN: CPT | Mod: S$GLB,,, | Performed by: NURSE PRACTITIONER

## 2024-11-20 PROCEDURE — 3008F BODY MASS INDEX DOCD: CPT | Mod: CPTII,S$GLB,, | Performed by: NURSE PRACTITIONER

## 2024-11-20 PROCEDURE — 1159F MED LIST DOCD IN RCRD: CPT | Mod: CPTII,S$GLB,, | Performed by: NURSE PRACTITIONER

## 2024-11-20 PROCEDURE — 1160F RVW MEDS BY RX/DR IN RCRD: CPT | Mod: CPTII,S$GLB,, | Performed by: NURSE PRACTITIONER

## 2024-11-20 PROCEDURE — 3077F SYST BP >= 140 MM HG: CPT | Mod: CPTII,S$GLB,, | Performed by: NURSE PRACTITIONER

## 2024-11-20 RX ORDER — EMPAGLIFLOZIN 25 MG/1
25 TABLET, FILM COATED ORAL DAILY
Qty: 90 TABLET | Refills: 3 | Status: SHIPPED | OUTPATIENT
Start: 2024-11-20

## 2024-11-20 RX ORDER — AMLODIPINE AND VALSARTAN 10; 320 MG/1; MG/1
1 TABLET ORAL DAILY
Qty: 90 TABLET | Refills: 3 | Status: SHIPPED | OUTPATIENT
Start: 2024-11-20 | End: 2025-11-20

## 2024-11-20 NOTE — PROGRESS NOTES
"  SUBJECTIVE:    Patient ID: Rhonda Mojica is a 69 y.o. female.    Chief Complaint: Follow-up (No bottles//Pt here for 3 mo follow up//discuss 2 falls. Pt states she can just be walking and fallout of no where. Pt does not know if it is her BP or weakness//declines flu vacc//JL)      History of Present Illness    CHIEF COMPLAINT:  Rhonda presents today for diabetes follow-up and high blood pressure.    RECENT FALLS:  She reports two recent falls. The first occurred in late October while exiting the bathtub, falling to her left side. The second happened last Saturday at her niece's party, falling onto her back while walking through the yard, resulting in a scraped left knee. Pt is a poor historian and describes "falling out" but unable to say if she actually had LOC. She denies any preceding symptoms such as dizziness, headache, palpitations or CP before episodes.    DIABETES MANAGEMENT:  She takes Mounjaro weekly injections, glipizide two tablets twice daily, metformin, and Jardiance for diabetes. She admits to missing some Mounjaro doses when feeling depressed. Her A1c has increased to 8.2    CARDIOVASCULAR HEALTH:  She is prescribed amlodipine 5 mg twice daily, lisinopril 20 mg two tablets daily, and metoprolol 50 mg daily for hypertension management, but did not take her blood pressure medication today. She has a known heart murmur and is followed by cardiologist Dr. Varner. Her last echocardiogram in February 2020 showed mod conc hypertrophy with grade 1 diastolic dysfxn, EF 72%, mild aortic valve leaflet thickening with no regurg, trivial mitral regurgitation    CHRONIC CONDITIONS:  She has dyslipidemia with a recent LDL of 85, which is above goal. She also has stage III chronic kidney disease with stable recent lab results.     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  After initial CT scan which indicated possible " parathyroid adenoma versus lymph node she had intact PTH level drawn which was normal.  She denies any issues with dysphagia    MENTAL HEALTH:  She reports feeling worried about her hospitalized sister, who calls daily causing distress.  Her sister recently had both of her feet amputated due to uncontrolled diabetes.  She denies feeling more depressed but declines medication and counseling for depression, preferring to manage independently with support from her children.    URINARY SYMPTOMS:  She experiences urinary urgency, particularly when exposed to water stimuli such as washing dishes, with difficulty holding urine. She denies associated abdominal pain.    MEDICATIONS:  Current medications include Mounjaro (weekly), glipizide, metformin, Jardiance, amlodipine, lisinopril, and metoprolol.      ROS:  General: no fever, no chills, no fatigue, no weight gain, no weight loss  Eyes: no vision changes, no redness, no discharge  ENT: no ear pain, no nasal congestion, no sore throat  Cardiovascular: no chest pain, no palpitations, no lower extremity edema, ?syncope  Respiratory: no cough, no shortness of breath  Gastrointestinal: no abdominal pain, no nausea, no vomiting, no diarrhea, no constipation, no blood in stool  Genitourinary: no dysuria, no hematuria, no frequency, complains of urgency  Musculoskeletal: no joint pain, no muscle pain  Skin: no rash, no lesion  Neurological: no headache, no dizziness, no numbness, no tingling, no loss of consciousness  Psychiatric: no anxiety, + depression, no sleep difficulty              Office Visit on 11/20/2024   Component Date Value Ref Range Status    EKG 12-Lead 11/20/2024 Sinus rhythm with marked sinus arrhythmia with 1st degree AV Block   Final    Ventricular Rate 11/20/2024 65   Final    MN Interval 11/20/2024 210   Final    QRS Duration 11/20/2024 114   Final    QT/QTc 11/20/2024 430/447   Final    PRT Axes 11/20/2024 50  -48  79   Final   Hospital Outpatient Visit on  11/07/2024   Component Date Value Ref Range Status    POC Creatinine 11/07/2024 1.2  0.5 - 1.4 mg/dL Final    Sample 11/07/2024 VENOUS   Final   Telephone on 11/05/2024   Component Date Value Ref Range Status    Glucose 11/08/2024 102 (H)  65 - 99 mg/dL Final    BUN 11/08/2024 20  7 - 25 mg/dL Final    Creatinine 11/08/2024 1.08 (H)  0.60 - 1.00 mg/dL Final    eGFR 11/08/2024 55 (L)  > OR = 60 mL/min/1.73m2 Final    BUN/Creatinine Ratio 11/08/2024 19  6 - 22 (calc) Final    Sodium 11/08/2024 142  135 - 146 mmol/L Final    Potassium 11/08/2024 4.3  3.5 - 5.3 mmol/L Final    Chloride 11/08/2024 107  98 - 110 mmol/L Final    CO2 11/08/2024 24  20 - 32 mmol/L Final    Calcium 11/08/2024 9.2  8.6 - 10.4 mg/dL Final    Total Protein 11/08/2024 6.8  6.1 - 8.1 g/dL Final    Albumin 11/08/2024 4.2  3.6 - 5.1 g/dL Final    Globulin, Total 11/08/2024 2.6  1.9 - 3.7 g/dL (calc) Final    Albumin/Globulin Ratio 11/08/2024 1.6  1.0 - 2.5 (calc) Final    Total Bilirubin 11/08/2024 0.5  0.2 - 1.2 mg/dL Final    Alkaline Phosphatase 11/08/2024 108  37 - 153 U/L Final    AST 11/08/2024 14  10 - 35 U/L Final    ALT 11/08/2024 14  6 - 29 U/L Final    Cholesterol 11/08/2024 144  <200 mg/dL Final    HDL 11/08/2024 38 (L)  > OR = 50 mg/dL Final    Triglycerides 11/08/2024 109  <150 mg/dL Final    LDL Cholesterol 11/08/2024 85  mg/dL (calc) Final    HDL/Cholesterol Ratio 11/08/2024 3.8  <5.0 (calc) Final    Non HDL Chol. (LDL+VLDL) 11/08/2024 106  <130 mg/dL (calc) Final    Hemoglobin A1C 11/08/2024 8.2 (H)  <5.7 % of total Hgb Final   Admission on 08/08/2024, Discharged on 08/08/2024   Component Date Value Ref Range Status    POCT Glucose 08/08/2024 140 (H)  70 - 110 mg/dL Final    POCT Glucose 08/08/2024 141 (H)  70 - 110 mg/dL Final   Office Visit on 08/01/2024   Component Date Value Ref Range Status    Hemoglobin A1C, POC 08/01/2024 7.5  % Final   Telephone on 05/23/2024   Component Date Value Ref Range Status    PTH, Intact 08/01/2024  63  16 - 77 pg/mL Final    Calcium 08/01/2024 9.1  8.6 - 10.4 mg/dL Final       Past Medical History:   Diagnosis Date    DDD (degenerative disc disease), lumbar     DM type 2 with diabetic dyslipidemia     Dyslipidemia     GERD (gastroesophageal reflux disease)     Hypertension      Past Surgical History:   Procedure Laterality Date    TRANSFORAMINAL EPIDURAL INJECTION OF STEROID Bilateral 8/8/2024    Procedure: Injection,steroid,epidural,transforaminal approach;  Surgeon: Christopher Blount MD;  Location: [facility];  Service: Pain Management;  Laterality: Bilateral;     No family history on file.    Tests to Keep You Healthy    Mammogram: Met on 11/30/2023  Eye Exam: Met on 8/15/2024  Colon Cancer Screening: Met on 11/10/2023  Last Blood Pressure <= 139/89 (11/20/2024): NO  Last HbA1c < 8 (11/08/2024): NO      The 10-year CVD risk score (DANG'Agostino, et al., 2008) is: 36.6%    Values used to calculate the score:      Age: 69 years      Sex: Female      Diabetic: Yes      Tobacco smoker: No      Systolic Blood Pressure: 162 mmHg      Is BP treated: Yes      HDL Cholesterol: 38 mg/dL      Total Cholesterol: 144 mg/dL     Marital Status:   Alcohol History:  reports no history of alcohol use.  Tobacco History:  reports that she has never smoked. She has never been exposed to tobacco smoke. She has never used smokeless tobacco.  Drug History:  reports no history of drug use.    Health Maintenance Topics with due status: Not Due       Topic Last Completion Date    Colorectal Cancer Screening 11/10/2023    Diabetes Urine Screening 04/23/2024    Foot Exam 08/01/2024    Eye Exam 08/15/2024    Low Dose Statin 10/24/2024    Lipid Panel 11/08/2024    Hemoglobin A1c 11/08/2024     Immunization History   Administered Date(s) Administered    COVID-19, MRNA, LN-S, PF (MODERNA FULL 0.5 ML DOSE) 03/30/2021, 04/27/2021    Influenza - Quadrivalent - High Dose - PF (65 years and older) 11/20/2020    Influenza - Trivalent - Afluria,  Fluzone MDV 08/29/2017    Influenza Split 08/29/2017    Pneumococcal Conjugate - 20 Valent 09/08/2022    Pneumococcal Polysaccharide - 23 Valent 04/28/2016       Review of patient's allergies indicates:  No Known Allergies    Current Outpatient Medications:     alcohol swabs (ALCOHOL WIPES) PadM, Apply 1 each topically once daily., Disp: 200 each, Rfl: 1    amlodipine-valsartan (EXFORGE)  mg per tablet, Take 1 tablet by mouth once daily., Disp: 90 tablet, Rfl: 3    aspirin 325 MG tablet, Take 325 mg by mouth once daily. , Disp: , Rfl:     blood sugar diagnostic Strp, To check BG 2 times daily, to use with insurance preferred meter, Disp: 180 strip, Rfl: 3    blood-glucose meter kit, To check BG 2 times daily, to use with insurance preferred meter, Disp: 1 each, Rfl: 0    calcium-vitamin D tablet 600 mg-200 units, Take 1 tablet by mouth once daily. , Disp: , Rfl:     cholecalciferol, vitamin D3, (VITAMIN D3) 2,000 unit Cap, Take 1 capsule by mouth once daily. , Disp: , Rfl:     gabapentin (NEURONTIN) 300 MG capsule, Take 1 capsule in AM and 2 capsules at bedtime, Disp: 270 capsule, Rfl: 3    glipiZIDE (GLUCOTROL) 5 MG tablet, Take 2 tablets (10 mg total) by mouth 2 (two) times daily with meals., Disp: 360 tablet, Rfl: 3    JARDIANCE 25 mg tablet, Take 1 tablet (25 mg total) by mouth once daily., Disp: 90 tablet, Rfl: 3    lancets Misc, To check BG 2 times daily, to use with insurance preferred meter, Disp: 180 each, Rfl: 3    LUMIGAN 0.01 % Drop, Place 1 drop into both eyes every evening., Disp: , Rfl:     meloxicam (MOBIC) 15 MG tablet, Take 1 tablet (15 mg total) by mouth once daily., Disp: 90 tablet, Rfl: 3    metFORMIN (GLUCOPHAGE) 1000 MG tablet, Take 1 tablet (1,000 mg total) by mouth 2 (two) times daily with meals., Disp: 180 tablet, Rfl: 3    metoprolol succinate (TOPROL-XL) 50 MG 24 hr tablet, Take 1 tablet (50 mg total) by mouth once daily., Disp: 90 tablet, Rfl: 3    omeprazole (PRILOSEC) 20 MG  "capsule, Take 1 capsule (20 mg total) by mouth once daily., Disp: 90 capsule, Rfl: 3    ondansetron (ZOFRAN-ODT) 4 MG TbDL, Take 1 tablet (4 mg total) by mouth every 6 (six) hours as needed (nausea/vomiting)., Disp: 12 tablet, Rfl: 0    pravastatin (PRAVACHOL) 40 MG tablet, Take 1 tablet (40 mg total) by mouth once daily. For cholesterol., Disp: 90 tablet, Rfl: 3    tirzepatide 10 mg/0.5 mL PnIj, Inject 10 mg into the skin every 7 days., Disp: 4 Pen, Rfl: 5    tiZANidine (ZANAFLEX) 4 MG tablet, Take 1 tablet (4 mg total) by mouth nightly as needed (back pain/muscle spasm)., Disp: 90 tablet, Rfl: 3    traMADoL (ULTRAM) 50 mg tablet, Take 1 tablet (50 mg total) by mouth every 6 (six) hours as needed for Pain., Disp: 20 tablet, Rfl: 0        Objective:      Vitals:    11/20/24 0804 11/20/24 0807   BP: (!) 152/56 (!) 162/66   Pulse: 63    SpO2: 96%    Weight: 75.3 kg (166 lb)    Height: 5' 4" (1.626 m)        Physical Exam  Vitals and nursing note reviewed.   Constitutional:       General: She is not in acute distress.     Appearance: Normal appearance. She is well-developed. She is not toxic-appearing.   HENT:      Head: Normocephalic and atraumatic.      Right Ear: Tympanic membrane and ear canal normal.      Left Ear: Tympanic membrane and ear canal normal.   Neck:      Thyroid: No thyroid mass or thyromegaly.      Vascular: No carotid bruit.   Cardiovascular:      Rate and Rhythm: Normal rate and regular rhythm.      Heart sounds: Murmur (2nd ICS LSB) heard.      Systolic murmur is present with a grade of 2/6.      No friction rub. No gallop.   Pulmonary:      Effort: Pulmonary effort is normal. No respiratory distress.      Breath sounds: Normal breath sounds. No wheezing or rales.   Abdominal:      Palpations: Abdomen is soft.   Musculoskeletal:      Right shoulder: No swelling or bony tenderness. Normal range of motion.      Right upper arm: No swelling, edema, tenderness or bony tenderness.      Cervical back: " Neck supple. No bony tenderness.      Right lower leg: No edema.      Left lower leg: No edema.   Lymphadenopathy:      Cervical: No cervical adenopathy.   Skin:     General: Skin is warm and dry.      Findings: No rash.   Neurological:      General: No focal deficit present.      Mental Status: She is alert and oriented to person, place, and time.      Cranial Nerves: Cranial nerves 2-12 are intact. No cranial nerve deficit.      Motor: Motor function is intact.      Gait: Gait normal.   Psychiatric:         Mood and Affect: Affect is flat.         Speech: Speech normal.         Behavior: Behavior normal. Behavior is cooperative.         Cognition and Memory: Cognition normal.          Assessment:       1. DM type 2 with diabetic dyslipidemia    2. Syncope and collapse    3. Essential hypertension    4. Abnormal CT scan, neck    5. Dyslipidemia    6. Stage 3a chronic kidney disease    7. Lumbar radiculopathy    8. Screening mammogram, encounter for    9. 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 Cox for surgical evaluation    TYPE 2 DIABETES MELLITUS:  - Educated on importance of medication adherence for diabetes management.  - Increased Mounjaro from 7.5 mg to 10 mg weekly.  - Continued glipizide two tablets twice daily, and metformin.  - Refilled Jardiance.    HYPERTENSION:  - Educated on importance of medication adherence for hypertension  management.  - Explained risks of uncontrolled hypertension including kidney damage, heart problems, and stroke.  - Ellis to obtain new blood pressure machine for home monitoring.  - Started amlodipine/valsartan 10/320 mg daily to replace separate lisinopril and amlodipine.  - Continued metoprolol 50 mg daily.    SYNCOPE AND UNSTEADINESS:  - Discussed potential cardiac causes of syncope and need for further evaluation.  - Ellis to report any further episodes of falls or loss of consciousness.  - Ordered EKG, brain MRI, and carotid ultrasound.  - Contact office if any more falls or passing out episodes occur.    ABNORMAL FINDINGS ON DIAGNOSTIC IMAGING:  - Referred to Dr. Cox, general surgery, for evaluation of neck mass.    PREVENTIVE CARE:  - Ordered mammogram.    FOLLOW-UP:  - Follow up in 2 weeks for nurse visit to recheck blood pressure.  - Follow up in 3 months.        Plan:       1. DM type 2 with diabetic dyslipidemia  -     JARDIANCE 25 mg tablet; Take 1 tablet (25 mg total) by mouth once daily.  Dispense: 90 tablet; Refill: 3  -     tirzepatide 10 mg/0.5 mL PnIj; Inject 10 mg into the skin every 7 days.  Dispense: 4 Pen; Refill: 5    2. Syncope and collapse  -     US Carotid Bilateral; Future; Expected date: 11/20/2024  -     MRI Brain Without Contrast; Future; Expected date: 11/20/2024  -     POCT EKG 12-LEAD (NOT FOR [facility] USE)    3. Essential hypertension  -     amlodipine-valsartan (EXFORGE)  mg per tablet; Take 1 tablet by mouth once daily.  Dispense: 90 tablet; Refill: 3    4. Abnormal CT scan, neck  -     Ambulatory referral/consult to General Surgery; Future; Expected date: 11/27/2024    5. Dyslipidemia    6. Stage 3a chronic kidney disease    7. Lumbar radiculopathy    8. Screening mammogram, encounter for  -     Mammo Digital Screening Bilat; Future; Expected date: 11/20/2024    9. Mild depression      Follow up in about 3 months (around 2/20/2025) for nurse visit in 2 weeks for BP check,  Diabetes, HTN.          Counseled on age and gender appropriate medical preventative services, including cancer screenings, immunizations, overall nutritional health, need for a consistent exercise regimen and an overall push towards maintaining a vigorous and active lifestyle.      This note was generated with the assistance of ambient listening technology. Verbal consent was obtained by the patient and accompanying visitor(s) for the recording of patient appointment to facilitate this note. I attest to having reviewed and edited the generated note for accuracy, though some syntax or spelling errors may persist. Please contact the author of this note for any clarification.       11/20/2024 Priyanka Garibay NP

## 2024-11-20 NOTE — PATIENT INSTRUCTIONS
MRI of the brain and carotid ultrasound has been ordered- [facility] will call you to schedule    Referral to Dr. Gonzalez, surgeon to evaluate right neck mass    Stop lisinopril and amlodipine and start valsartan-amlodipine  one tablet daily for blood pressure, continue with metoprolol

## 2024-12-04 ENCOUNTER — CLINICAL SUPPORT (OUTPATIENT)
Dept: FAMILY MEDICINE | Facility: CLINIC | Age: 69
End: 2024-12-04
Payer: MEDICARE

## 2024-12-04 VITALS — DIASTOLIC BLOOD PRESSURE: 64 MMHG | SYSTOLIC BLOOD PRESSURE: 136 MMHG

## 2024-12-04 DIAGNOSIS — I10 ESSENTIAL HYPERTENSION: Primary | ICD-10-CM

## 2024-12-04 NOTE — PROGRESS NOTES
Came in early to clinic for BP check, 140/62 left arm, 136/64 right arm, told patient we would call her later so she could make it to work.   Per Priyanka continue the same, be sure to take daily and follow up in Feb. Called and notified pt

## 2024-12-05 ENCOUNTER — HOSPITAL ENCOUNTER (OUTPATIENT)
Dept: RADIOLOGY | Facility: HOSPITAL | Age: 69
Discharge: HOME | End: 2024-12-05
Attending: NURSE PRACTITIONER
Payer: MEDICARE

## 2024-12-05 VITALS — HEIGHT: 64 IN | WEIGHT: 166 LBS | BODY MASS INDEX: 28.34 KG/M2

## 2024-12-05 DIAGNOSIS — R55 SYNCOPE AND COLLAPSE: ICD-10-CM

## 2024-12-05 DIAGNOSIS — Z12.31 SCREENING MAMMOGRAM, ENCOUNTER FOR: ICD-10-CM

## 2024-12-05 PROCEDURE — 93880 EXTRACRANIAL BILAT STUDY: CPT | Mod: 26,,, | Performed by: RADIOLOGY

## 2024-12-05 PROCEDURE — 77067 SCR MAMMO BI INCL CAD: CPT | Mod: 26,,, | Performed by: RADIOLOGY

## 2024-12-05 PROCEDURE — 70551 MRI BRAIN STEM W/O DYE: CPT | Mod: 26,,, | Performed by: RADIOLOGY

## 2024-12-05 PROCEDURE — 77067 SCR MAMMO BI INCL CAD: CPT | Mod: TC,PO

## 2024-12-05 PROCEDURE — 77063 BREAST TOMOSYNTHESIS BI: CPT | Mod: 26,,, | Performed by: RADIOLOGY

## 2024-12-05 PROCEDURE — 93880 EXTRACRANIAL BILAT STUDY: CPT | Mod: TC,PO

## 2024-12-05 PROCEDURE — 70551 MRI BRAIN STEM W/O DYE: CPT | Mod: TC,PO

## 2024-12-12 ENCOUNTER — OFFICE VISIT (OUTPATIENT)
Dept: SURGERY | Facility: CLINIC | Age: 69
End: 2024-12-12
Payer: MEDICARE

## 2024-12-12 ENCOUNTER — TELEPHONE (OUTPATIENT)
Dept: FAMILY MEDICINE | Facility: CLINIC | Age: 69
End: 2024-12-12
Payer: MEDICARE

## 2024-12-12 VITALS
HEART RATE: 85 BPM | BODY MASS INDEX: 28.34 KG/M2 | SYSTOLIC BLOOD PRESSURE: 147 MMHG | HEIGHT: 64 IN | WEIGHT: 166 LBS | DIASTOLIC BLOOD PRESSURE: 72 MMHG | RESPIRATION RATE: 16 BRPM | TEMPERATURE: 97 F

## 2024-12-12 DIAGNOSIS — R22.1 UNILATERAL MASS OF NECK: Primary | ICD-10-CM

## 2024-12-12 DIAGNOSIS — R93.89 ABNORMAL CT SCAN, NECK: ICD-10-CM

## 2024-12-12 PROCEDURE — 4010F ACE/ARB THERAPY RXD/TAKEN: CPT | Mod: CPTII,S$GLB,, | Performed by: SURGERY

## 2024-12-12 PROCEDURE — 3052F HG A1C>EQUAL 8.0%<EQUAL 9.0%: CPT | Mod: CPTII,S$GLB,, | Performed by: SURGERY

## 2024-12-12 PROCEDURE — 3061F NEG MICROALBUMINURIA REV: CPT | Mod: CPTII,S$GLB,, | Performed by: SURGERY

## 2024-12-12 PROCEDURE — 99999 PR PBB SHADOW E&M-EST. PATIENT-LVL V: CPT | Mod: PBBFAC,,, | Performed by: SURGERY

## 2024-12-12 PROCEDURE — 1101F PT FALLS ASSESS-DOCD LE1/YR: CPT | Mod: CPTII,S$GLB,, | Performed by: SURGERY

## 2024-12-12 PROCEDURE — 1125F AMNT PAIN NOTED PAIN PRSNT: CPT | Mod: CPTII,S$GLB,, | Performed by: SURGERY

## 2024-12-12 PROCEDURE — 3077F SYST BP >= 140 MM HG: CPT | Mod: CPTII,S$GLB,, | Performed by: SURGERY

## 2024-12-12 PROCEDURE — 99204 OFFICE O/P NEW MOD 45 MIN: CPT | Mod: S$GLB,,, | Performed by: SURGERY

## 2024-12-12 PROCEDURE — 3288F FALL RISK ASSESSMENT DOCD: CPT | Mod: CPTII,S$GLB,, | Performed by: SURGERY

## 2024-12-12 PROCEDURE — 1160F RVW MEDS BY RX/DR IN RCRD: CPT | Mod: CPTII,S$GLB,, | Performed by: SURGERY

## 2024-12-12 PROCEDURE — 3078F DIAST BP <80 MM HG: CPT | Mod: CPTII,S$GLB,, | Performed by: SURGERY

## 2024-12-12 PROCEDURE — 3066F NEPHROPATHY DOC TX: CPT | Mod: CPTII,S$GLB,, | Performed by: SURGERY

## 2024-12-12 PROCEDURE — 1159F MED LIST DOCD IN RCRD: CPT | Mod: CPTII,S$GLB,, | Performed by: SURGERY

## 2024-12-12 PROCEDURE — 3008F BODY MASS INDEX DOCD: CPT | Mod: CPTII,S$GLB,, | Performed by: SURGERY

## 2024-12-12 NOTE — TELEPHONE ENCOUNTER
----- Message from Iris sent at 12/12/2024  9:12 AM CST -----  Patient needs the information for a specialist that she was referred to.  [phone]

## 2024-12-12 NOTE — PROGRESS NOTES
Subjective:       Patient ID: Rhonda Mojica is a 69 y.o. female.    Chief Complaint:  22 mm nodule in the tracheoesophageal groove right neck    HPI:  Patient is 69-year-old female who had incidental finding of a 22 mm nodule in the right tracheoesophageal groove at the level of the thoracic inlet.  Differentials included lymph node, parathyroid adenoma.  Her calciums have been normal on each of her lab draws going back at least 2 years.  Her PTH is within normal limits at 63.  She had CT of her head and C-spine after a fall from standing height in April this year.  The nodule was seen incidentally.  On follow-up CT scan it was better defined at 22 x 17 mm.  She reports no compression symptoms.  No pain in the neck.  No history of neck surgery or radiation.  No history of smoking.  No history of any other smokeless tobacco products.  No significant alcohol history.  She states she has been having issues with her lower teeth with a lot of breakages    Past Medical History:   Diagnosis Date    DDD (degenerative disc disease), lumbar     DM type 2 with diabetic dyslipidemia     Dyslipidemia     GERD (gastroesophageal reflux disease)     Hypertension      Past Surgical History:   Procedure Laterality Date    TRANSFORAMINAL EPIDURAL INJECTION OF STEROID Bilateral 8/8/2024    Procedure: Injection,steroid,epidural,transforaminal approach;  Surgeon: Christopher Blount MD;  Location: [facility] OR;  Service: Pain Management;  Laterality: Bilateral;     Review of patient's allergies indicates:  No Known Allergies  Medication List with Changes/Refills   Current Medications    ALCOHOL SWABS (ALCOHOL WIPES) PADM    Apply 1 each topically once daily.    AMLODIPINE-VALSARTAN (EXFORGE)  MG PER TABLET    Take 1 tablet by mouth once daily.    ASPIRIN 325 MG TABLET    Take 325 mg by mouth once daily.     BLOOD SUGAR DIAGNOSTIC STRP    To check BG 2 times daily, to use with insurance preferred meter    BLOOD-GLUCOSE METER KIT    To check  BG 2 times daily, to use with insurance preferred meter    CALCIUM-VITAMIN D TABLET 600 MG-200 UNITS    Take 1 tablet by mouth once daily.     CHOLECALCIFEROL, VITAMIN D3, (VITAMIN D3) 2,000 UNIT CAP    Take 1 capsule by mouth once daily.     GABAPENTIN (NEURONTIN) 300 MG CAPSULE    Take 1 capsule in AM and 2 capsules at bedtime    GLIPIZIDE (GLUCOTROL) 5 MG TABLET    Take 2 tablets (10 mg total) by mouth 2 (two) times daily with meals.    JARDIANCE 25 MG TABLET    Take 1 tablet (25 mg total) by mouth once daily.    LANCETS MISC    To check BG 2 times daily, to use with insurance preferred meter    LUMIGAN 0.01 % DROP    Place 1 drop into both eyes every evening.    MELOXICAM (MOBIC) 15 MG TABLET    Take 1 tablet (15 mg total) by mouth once daily.    METFORMIN (GLUCOPHAGE) 1000 MG TABLET    Take 1 tablet (1,000 mg total) by mouth 2 (two) times daily with meals.    METOPROLOL SUCCINATE (TOPROL-XL) 50 MG 24 HR TABLET    Take 1 tablet (50 mg total) by mouth once daily.    OMEPRAZOLE (PRILOSEC) 20 MG CAPSULE    Take 1 capsule (20 mg total) by mouth once daily.    ONDANSETRON (ZOFRAN-ODT) 4 MG TBDL    Take 1 tablet (4 mg total) by mouth every 6 (six) hours as needed (nausea/vomiting).    PRAVASTATIN (PRAVACHOL) 40 MG TABLET    Take 1 tablet (40 mg total) by mouth once daily. For cholesterol.    TIRZEPATIDE 10 MG/0.5 ML PNIJ    Inject 10 mg into the skin every 7 days.    TIZANIDINE (ZANAFLEX) 4 MG TABLET    Take 1 tablet (4 mg total) by mouth nightly as needed (back pain/muscle spasm).    TRAMADOL (ULTRAM) 50 MG TABLET    Take 1 tablet (50 mg total) by mouth every 6 (six) hours as needed for Pain.     No family history on file.  Social History     Socioeconomic History    Marital status:    Tobacco Use    Smoking status: Never     Passive exposure: Never    Smokeless tobacco: Never   Substance and Sexual Activity    Alcohol use: Never    Drug use: Never     Social Drivers of Health     Financial Resource Strain:  High Risk (11/18/2024)    Overall Financial Resource Strain (CARDIA)     Difficulty of Paying Living Expenses: Very hard   Food Insecurity: No Food Insecurity (11/18/2024)    Hunger Vital Sign     Worried About Running Out of Food in the Last Year: Never true     Ran Out of Food in the Last Year: Never true   Transportation Needs: No Transportation Needs (11/18/2024)    TRANSPORTATION NEEDS     Transportation : No   Physical Activity: Inactive (11/18/2024)    Exercise Vital Sign     Days of Exercise per Week: 0 days     Minutes of Exercise per Session: 0 min   Stress: No Stress Concern Present (11/18/2024)    [employer] [place] [employer] - Occupational Stress Questionnaire     Feeling of Stress : Not at all   Housing Stability: Low Risk  (11/18/2024)    Housing Stability Vital Sign     Unable to Pay for Housing in the Last Year: No     Homeless in the Last Year: No         Review of Systems   Constitutional:  Negative for appetite change, chills, fever and unexpected weight change.   HENT:  Negative for hearing loss, rhinorrhea, sore throat and voice change.    Eyes:  Negative for photophobia and visual disturbance.   Respiratory:  Negative for cough, choking and shortness of breath.    Cardiovascular:  Negative for chest pain, palpitations and leg swelling.   Gastrointestinal:  Negative for abdominal pain, blood in stool, constipation, diarrhea, nausea and vomiting.   Endocrine: Negative for cold intolerance, heat intolerance, polydipsia and polyuria.   Musculoskeletal:  Negative for arthralgias, back pain, joint swelling and neck stiffness.   Skin:  Negative for color change, pallor and rash.   Neurological:  Negative for dizziness, seizures, syncope and headaches.   Hematological:  Negative for adenopathy. Does not bruise/bleed easily.   Psychiatric/Behavioral:  Negative for agitation, behavioral problems and confusion.        Objective:      Physical Exam  Constitutional:       General: She is not in  acute distress.     Appearance: Normal appearance. She is not toxic-appearing.   Neck:      Thyroid: No thyroid mass, thyromegaly or thyroid tenderness.      Trachea: Trachea and phonation normal.   Pulmonary:      Effort: No tachypnea, bradypnea or respiratory distress.   Abdominal:      General: There is no distension.      Palpations: Abdomen is soft.      Tenderness: There is no abdominal tenderness.   Lymphadenopathy:      Cervical: No cervical adenopathy.   Neurological:      Mental Status: She is alert and oriented to person, place, and time.   Psychiatric:         Behavior: Behavior is cooperative.         Assessment/Plan:   Diagnoses and all orders for this visit:    Unilateral mass of neck  -     Ambulatory referral/consult to ENT; Future    Abnormal CT scan, neck  -     Ambulatory referral/consult to General Surgery        Patient with incidental finding of a 22 mm nodule in the right tracheoesophageal groove.  Does not appear to be consistent with a parathyroid adenoma based on PTH levels and calcium levels.  Does not seem to be attached to the thyroid on imaging.  Would like to refer to ENT for their opinion on any further workup or opinion on excision.  I would be comfortable with observation and another 3 months with repeat imaging.      I discussed the proposed procedures with the patient including risks, benefits, indications, alternatives and special concerns.  The patient appears to understand and agrees to go ahead with surgery.  I have made no promises, warranties or verbal agreements beyond what was discussed above.    No follow-ups on file.

## 2024-12-13 ENCOUNTER — TELEPHONE (OUTPATIENT)
Dept: FAMILY MEDICINE | Facility: CLINIC | Age: 69
End: 2024-12-13
Payer: MEDICARE

## 2024-12-13 NOTE — TELEPHONE ENCOUNTER
Spoke with pt in regards to recent carotid ultrasound results. Verbalized verbatim per Priyanka. Pt acknowledged understanding.

## 2024-12-13 NOTE — TELEPHONE ENCOUNTER
Spoke with pt in regards to recent mammogram results. Verbalized verbatim per Priyanka. Pt acknowledged understanding.

## 2024-12-13 NOTE — TELEPHONE ENCOUNTER
Spoke with pt in regards to recent MRI of the brain. Verbalized verbatim per Priyanka. Pt acknowledge understanding.

## 2024-12-17 ENCOUNTER — LAB VISIT (OUTPATIENT)
Dept: LAB | Facility: HOSPITAL | Age: 69
End: 2024-12-17
Attending: OTOLARYNGOLOGY
Payer: MEDICARE

## 2024-12-17 ENCOUNTER — OFFICE VISIT (OUTPATIENT)
Dept: OTOLARYNGOLOGY | Facility: CLINIC | Age: 69
End: 2024-12-17
Payer: MEDICARE

## 2024-12-17 ENCOUNTER — PATIENT OUTREACH (OUTPATIENT)
Dept: ADMINISTRATIVE | Facility: OTHER | Age: 69
End: 2024-12-17
Payer: MEDICARE

## 2024-12-17 VITALS
HEIGHT: 64 IN | DIASTOLIC BLOOD PRESSURE: 69 MMHG | WEIGHT: 162.94 LBS | HEART RATE: 86 BPM | BODY MASS INDEX: 27.82 KG/M2 | SYSTOLIC BLOOD PRESSURE: 159 MMHG

## 2024-12-17 DIAGNOSIS — R22.1 UNILATERAL MASS OF NECK: ICD-10-CM

## 2024-12-17 DIAGNOSIS — R93.89 ABNORMAL CT SCAN, NECK: Primary | ICD-10-CM

## 2024-12-17 DIAGNOSIS — R93.89 ABNORMAL CT SCAN, NECK: ICD-10-CM

## 2024-12-17 LAB — TSH SERPL DL<=0.005 MIU/L-ACNC: 0.96 UIU/ML (ref 0.4–4)

## 2024-12-17 PROCEDURE — 3061F NEG MICROALBUMINURIA REV: CPT | Mod: CPTII,S$GLB,, | Performed by: OTOLARYNGOLOGY

## 2024-12-17 PROCEDURE — 3078F DIAST BP <80 MM HG: CPT | Mod: CPTII,S$GLB,, | Performed by: OTOLARYNGOLOGY

## 2024-12-17 PROCEDURE — 99999 PR PBB SHADOW E&M-EST. PATIENT-LVL IV: CPT | Mod: PBBFAC,,, | Performed by: OTOLARYNGOLOGY

## 2024-12-17 PROCEDURE — 36415 COLL VENOUS BLD VENIPUNCTURE: CPT | Performed by: OTOLARYNGOLOGY

## 2024-12-17 PROCEDURE — 1160F RVW MEDS BY RX/DR IN RCRD: CPT | Mod: CPTII,S$GLB,, | Performed by: OTOLARYNGOLOGY

## 2024-12-17 PROCEDURE — 86800 THYROGLOBULIN ANTIBODY: CPT | Mod: 91 | Performed by: OTOLARYNGOLOGY

## 2024-12-17 PROCEDURE — 84443 ASSAY THYROID STIM HORMONE: CPT | Performed by: OTOLARYNGOLOGY

## 2024-12-17 PROCEDURE — 1125F AMNT PAIN NOTED PAIN PRSNT: CPT | Mod: CPTII,S$GLB,, | Performed by: OTOLARYNGOLOGY

## 2024-12-17 PROCEDURE — 86800 THYROGLOBULIN ANTIBODY: CPT | Performed by: OTOLARYNGOLOGY

## 2024-12-17 PROCEDURE — 31575 DIAGNOSTIC LARYNGOSCOPY: CPT | Mod: S$GLB,,, | Performed by: OTOLARYNGOLOGY

## 2024-12-17 PROCEDURE — 4010F ACE/ARB THERAPY RXD/TAKEN: CPT | Mod: CPTII,S$GLB,, | Performed by: OTOLARYNGOLOGY

## 2024-12-17 PROCEDURE — 3066F NEPHROPATHY DOC TX: CPT | Mod: CPTII,S$GLB,, | Performed by: OTOLARYNGOLOGY

## 2024-12-17 PROCEDURE — 3288F FALL RISK ASSESSMENT DOCD: CPT | Mod: CPTII,S$GLB,, | Performed by: OTOLARYNGOLOGY

## 2024-12-17 PROCEDURE — 3052F HG A1C>EQUAL 8.0%<EQUAL 9.0%: CPT | Mod: CPTII,S$GLB,, | Performed by: OTOLARYNGOLOGY

## 2024-12-17 PROCEDURE — 1100F PTFALLS ASSESS-DOCD GE2>/YR: CPT | Mod: CPTII,S$GLB,, | Performed by: OTOLARYNGOLOGY

## 2024-12-17 PROCEDURE — 3008F BODY MASS INDEX DOCD: CPT | Mod: CPTII,S$GLB,, | Performed by: OTOLARYNGOLOGY

## 2024-12-17 PROCEDURE — 3077F SYST BP >= 140 MM HG: CPT | Mod: CPTII,S$GLB,, | Performed by: OTOLARYNGOLOGY

## 2024-12-17 PROCEDURE — 99214 OFFICE O/P EST MOD 30 MIN: CPT | Mod: 25,S$GLB,, | Performed by: OTOLARYNGOLOGY

## 2024-12-17 PROCEDURE — 1159F MED LIST DOCD IN RCRD: CPT | Mod: CPTII,S$GLB,, | Performed by: OTOLARYNGOLOGY

## 2024-12-17 NOTE — PROCEDURES
"Laryngoscopy    Date/Time: 12/17/2024 9:40 AM    Performed by: Prem Goff MD  Authorized by: Prem Goff MD    Time out: Immediately prior to procedure a "time out" was called to verify the correct patient, procedure, equipment, support staff and site/side marked as required.    Consent Done?:  Yes (Verbal)  Anesthesia:     Local anesthetic:  Other    Patient tolerance:  Patient tolerated the procedure well with no immediate complications    Decongestion performed?: Yes    Laryngoscopy:     Areas examined:  Nasal cavities, nasopharynx, oropharynx, hypopharynx, larynx and vocal cords  Larynx/hypopharynx:        No suspicious findings with flexible video endoscopy with slimline scope after topical Afrin and lidocaine.  Normal mobility of both vocal cords.         "

## 2024-12-17 NOTE — PROGRESS NOTES
[facility]    Subjective:      Patient: Rhonda Mojica Patient PCP: Priyanka Garibay NP         :  1955     Sex:  female      MRN:  [id number]          Date of Visit: 2024      Chief Complaint: Mass (CT Neck/chest on 24 and repeated 24 showed mass. Was sent to General Surgery and then referred to ENT for evaluation. PTH and Calcium levels drawn 24. )      Patient ID: Rhonda Mojica is a 69 y.o. female     Patient is a  lifelong NON-smoker with a past medical history of type 2 diabetes, HLD, HTN, CKD 3 and reflux referred to me by Dr. Cr Gonzalez * in consultation for incidental findings of a right paratracheal mass on CT spine.  Patient had a CT cervical spine which showed a quit mildly enlarged right paratracheal lymph node measuring 1.1 cm short axis underwent CT soft tissue neck 2024 for this incidental finding which reports a 20 x 11 x 28 mm unchanged right tracheoesophageal groove oval mass with no cervical or supraclavicular lymph nodes of any abnormal size.  No more remote cross-sectional imaging of the neck or chest for further evaluation comparison.    Patient denies any hemoptysis, hematemesis or melena, respiratory difficulty, dysphagia, or B symptoms of fevers, chills, night sweats, unexpected weight loss.  No chronic cough or any pulmonary complaints or any skin lesions consistent with a history of sarcoidosis.  No prior ultrasound of the neck or thyroid.  No attempts at biopsy.  No prior EGD or bronchoscopy.    2024 CT soft tissue neck with contrast images reviewed show this soft tissue mass in the right paratracheal esophageal groove.  There appears to be fatty soft tissue separation from the esophagus.  It does appear to be directly adjacent to the cartilage perichondrium of the upper trachea.  It has a not specifically ovoid oblong vertical appearance with some mixed density.  It does not enhance/have similar appearance to thyroid.  No other appreciable  "masses.            09/18/2020 chest x-ray reports no abnormality.  Perhaps some paratracheal soft tissue density noted on my review.      No results found for: "TSH"      Calcium   Date Value Ref Range Status   11/08/2024 9.2 8.6 - 10.4 mg/dL Final   08/01/2024 9.1 8.6 - 10.4 mg/dL Final   04/23/2024 9.4 8.6 - 10.4 mg/dL Final   01/26/2024 9.5 8.6 - 10.4 mg/dL Final   06/01/2023 9.3 8.6 - 10.4 mg/dL Final       PTH, Intact   Date Value Ref Range Status   08/01/2024 63 16 - 77 pg/mL Final     Comment:        Interpretive Guide    Intact PTH           Calcium  ------------------    ----------           -------  Normal Parathyroid    Normal               Normal  Hypoparathyroidism    Low or Low Normal    Low  Hyperparathyroidism     Primary            Normal or High       High     Secondary          High                 Normal or Low     Tertiary           High                 High  Non-Parathyroid     Hypercalcemia      Low or Low Normal    High            Labs:  WBC   Date Value Ref Range Status   04/23/2024 10.3 3.8 - 10.8 Thousand/uL Final     Hemoglobin   Date Value Ref Range Status   04/23/2024 12.1 11.7 - 15.5 g/dL Final     Platelets   Date Value Ref Range Status   04/23/2024 332 140 - 400 Thousand/uL Final     Creatinine   Date Value Ref Range Status   11/08/2024 1.08 (H) 0.60 - 1.00 mg/dL Final     Hemoglobin A1C   Date Value Ref Range Status   11/08/2024 8.2 (H) <5.7 % of total Hgb Final     Comment:     For someone without known diabetes, a hemoglobin A1c  value of 6.5% or greater indicates that they may have   diabetes and this should be confirmed with a follow-up   test.     For someone with known diabetes, a value <7% indicates   that their diabetes is well controlled and a value   greater than or equal to 7% indicates suboptimal   control. A1c targets should be individualized based on   duration of diabetes, age, comorbid conditions, and   other considerations.     Currently, no consensus exists " "regarding use of  hemoglobin A1c for diagnosis of diabetes for children.             Past Medical History  She has a past medical history of DDD (degenerative disc disease), lumbar, DM type 2 with diabetic dyslipidemia, Dyslipidemia, GERD (gastroesophageal reflux disease), and Hypertension.    Family / Surgical / Social History  Her family history is not on file.    Past Surgical History:   Procedure Laterality Date    TRANSFORAMINAL EPIDURAL INJECTION OF STEROID Bilateral 8/8/2024    Procedure: Injection,steroid,epidural,transforaminal approach;  Surgeon: Christopher Blount MD;  Location: [facility] OR;  Service: Pain Management;  Laterality: Bilateral;       Social History     Tobacco Use    Smoking status: Never     Passive exposure: Never    Smokeless tobacco: Never   Substance and Sexual Activity    Alcohol use: Never    Drug use: Never    Sexual activity: Not on file       Medications  She has a current medication list which includes the following prescription(s): alcohol swabs, amlodipine-valsartan, aspirin, blood sugar diagnostic, blood-glucose meter, calcium-vitamin d3, cholecalciferol (vitamin d3), gabapentin, glipizide, jardiance, lancets, lumigan, meloxicam, metformin, metoprolol succinate, omeprazole, ondansetron, pravastatin, tirzepatide, tizanidine, and tramadol.      Allergies  Review of patient's allergies indicates:  No Known Allergies    All medications, allergies, and past history have been reviewed.    Objective:      Vitals:      12/5/2024     9:56 AM 12/12/2024     9:50 AM 12/17/2024    10:02 AM   Vitals - 1 value per visit   SYSTOLIC  147 159   DIASTOLIC  72 69   Pulse  85 86   Temp  97.2 °F (36.2 °C)    Resp  16    Weight (lb) 166 166.01 162.92   Weight (kg) 75.297 75.3 73.9   Height 5' 4" (1.626 m) 5' 4" (1.626 m) 5' 4" (1.626 m)   BMI (Calculated) 28.5 28.5 28   Pain Score  Four Three       Body surface area is 1.83 meters squared.    Physical Exam:    GENERAL  APPEARANCE -  alert, appears stated " age, and cooperative  BARRIER(S) TO COMMUNICATION -  none VOICE - appropriate for age and gender    INTEGUMENTARY  no suspicious head and neck lesions    HEENT  HEAD: Normocephalic, without obvious abnormality, atraumatic  FACE: INSPECTION - Symmetric, no signs of trauma, no suspicious lesion(s)      STRENGTH - facial symmetry intact     PALPATION -  No masses     SALIVARY GLANDS - non-tender with no appreciable mass    NECK/THYROID: normal atraumatic, no neck masses, normal thyroid, no jvd    EYES  Normal occular alignment and mobility with no visible nystagmus at rest    EARS/NOSE/MOUTH/THROAT  EARS  PINNAE AND EXTERNAL EARS - no suspicious lesion OTOSCOPIC EXAM (surgical microscopy was not used for visualization/instrumentation): EAR EXAM - Normal ear canals, tympanic membranes and mobility, and middle ear spaces bilaterally.  HEARING - grossly intact to voice/finger rub    NOSE AND SINUSES  EXTERNAL NOSE - Grossly normal for age/sex  SEPTUM - normal/no obstruction on anterior exam without decongestion TURBINATES - within normal limits MUCOSA - within normal limits     MOUTH AND THROAT   ORAL CAVITY, LIPS, TEETH, GUMS & TONGUE - dentition in poor repair no suspicious lesion  OROPHARYNX /TONSILS/PHARYNGEAL WALLS/HYPOPHARYNX - no erythema or exudates  NASOPHARYNX - limited mirror exam - unable to visualize due to anatomy/gag  LARYNX -  - limited mirror exam - unable to visualize the larynx due to gag in anatomy      CHEST AND LUNG   INSPECTION & AUSCULTATION - normal effort, no stridor    CARDIOVASCULAR  AUSCULTATION & PERIPHERAL VASCULAR - regular rate and rhythm.    NEUROLOGIC  MENTAL STATUS - alert, interactive CRANIAL NERVES - normal    LYMPHATIC  HEAD AND NECK - non-palpable; SUPRACLAVICULAR - deferred      Procedure(s):  Flexible laryngoscopy performed.  See procedure note.    No suspicious findings with flexible video endoscopy with slimline scope after topical Afrin and lidocaine.  Normal mobility of both  vocal cords.      Assessment:      Problem List Items Addressed This Visit          Other    Abnormal CT scan, neck - Primary     Other Visit Diagnoses       Unilateral mass of neck                     Plan:      TSH, thyroglobulin and antithyroglobulin antibody testing and ultrasound of the neck possibly followed by ultrasound guided biopsy of the neck mass.  Not likely a parathyroid adenoma though PTH can be acid on the needle biopsy if additional material is held.  This will all be coordinated pending the diagnostic testing as above.    Follow up as needed; we will coordinate care based on initial results as above.          Voice recognition software was used in the creation of this note/communication and any sound-alike errors which may have occurred from its use should be taken in context when interpreting.  If such errors prevent a clear understanding of the note/communication, please contact the office for clarification.

## 2024-12-17 NOTE — PROGRESS NOTES
CHW - Outreach Attempt    Community Health Worker left a voicemail message for 2nd attempt to contact caregiver regarding: Spoke to the daughter and she didn't know the info that I was asking. I will call back intwo weeks. 2nd attempt.   Community Health Worker to attempt to contact caregiver on:    12/31/24

## 2024-12-18 LAB
THRYOGLOBULIN INTERPRETATION: ABNORMAL
THYROGLOB AB SERPL-ACNC: <1.8 IU/ML
THYROGLOB SERPL-MCNC: 3.5 NG/ML

## 2024-12-26 ENCOUNTER — HOSPITAL ENCOUNTER (OUTPATIENT)
Dept: RADIOLOGY | Facility: HOSPITAL | Age: 69
Discharge: HOME | End: 2024-12-26
Attending: OTOLARYNGOLOGY
Payer: MEDICARE

## 2024-12-26 DIAGNOSIS — R93.89 ABNORMAL CT SCAN, NECK: ICD-10-CM

## 2024-12-26 DIAGNOSIS — R22.1 UNILATERAL MASS OF NECK: ICD-10-CM

## 2024-12-26 PROCEDURE — 76536 US EXAM OF HEAD AND NECK: CPT | Mod: TC

## 2024-12-26 PROCEDURE — 76536 US EXAM OF HEAD AND NECK: CPT | Mod: 26,,, | Performed by: RADIOLOGY

## 2025-02-05 ENCOUNTER — TELEPHONE (OUTPATIENT)
Dept: OTOLARYNGOLOGY | Facility: CLINIC | Age: 70
End: 2025-02-05
Payer: MEDICARE

## 2025-02-05 NOTE — TELEPHONE ENCOUNTER
Called pt and let her know that Dr. Goff ordered U.S Bx and to let us know if the  does not get in contact with her shortly. Thanks, Stella

## 2025-02-06 ENCOUNTER — TELEPHONE (OUTPATIENT)
Dept: INTERVENTIONAL RADIOLOGY/VASCULAR | Facility: HOSPITAL | Age: 70
End: 2025-02-06

## 2025-02-06 ENCOUNTER — TELEPHONE (OUTPATIENT)
Dept: OTOLARYNGOLOGY | Facility: CLINIC | Age: 70
End: 2025-02-06
Payer: MEDICARE

## 2025-02-06 NOTE — TELEPHONE ENCOUNTER
----- Message from JUS Huber sent at 2/6/2025  7:48 AM CST -----  Regarding: Request for Procedure  Good morning,     We received a request for a US Guided Needle Placement for the attached patient. Per the radiologist, we can not perform this procedure safely due to location of mass. He recommends  surgery eval with mediastinoscopy if they want tissue from that area. Thanks for your time.       Cecile Daniel RN   [facility] Interventional Radiology/ Vascular Access Services

## 2025-02-06 NOTE — TELEPHONE ENCOUNTER
Let us see if we can get her to see Dr. Mckeon and get his take on things.  He would certainly no who with thoracic surgery might be an appropriate person to send for such evaluation if he thinks it is an appropriate next step.

## 2025-02-13 LAB
LEFT EYE DM RETINOPATHY: POSITIVE
RIGHT EYE DM RETINOPATHY: POSITIVE

## 2025-02-14 ENCOUNTER — TELEPHONE (OUTPATIENT)
Dept: FAMILY MEDICINE | Facility: CLINIC | Age: 70
End: 2025-02-14
Payer: MEDICARE

## 2025-02-14 NOTE — TELEPHONE ENCOUNTER
Spoke with pt in regards to recent message sent. Verbalized to the pt that no blood work is needed before her up coming office visit on 02/20/2025. Pt acknowledged understanding.

## 2025-02-14 NOTE — TELEPHONE ENCOUNTER
----- Message from Iris sent at 2/14/2025  8:11 AM CST -----  Patient asking if she need to get labs done before her appointment   [phone]

## 2025-02-20 ENCOUNTER — TELEPHONE (OUTPATIENT)
Dept: FAMILY MEDICINE | Facility: CLINIC | Age: 70
End: 2025-02-20

## 2025-02-20 ENCOUNTER — TELEPHONE (OUTPATIENT)
Dept: PHARMACY | Facility: CLINIC | Age: 70
End: 2025-02-20
Payer: MEDICARE

## 2025-02-20 NOTE — TELEPHONE ENCOUNTER
----- Message from Iris sent at 2/20/2025  8:03 AM CST -----  Patient need to reschedule her appointment for today due to having to watch her grand child for today. [phone]

## 2025-02-20 NOTE — TELEPHONE ENCOUNTER
We have reached out to Ms. Mojica via letter and portal message  to inform her of the [employer] application process for Lumigan. A follow-up phone call will be made in 5 business days.    Kelsea Salomon  Pharmacy Patient Assistance Team

## 2025-02-26 DIAGNOSIS — Z78.0 MENOPAUSE: ICD-10-CM

## 2025-02-27 ENCOUNTER — PATIENT MESSAGE (OUTPATIENT)
Dept: PHARMACY | Facility: CLINIC | Age: 70
End: 2025-02-27
Payer: MEDICARE

## 2025-02-27 ENCOUNTER — OFFICE VISIT (OUTPATIENT)
Dept: FAMILY MEDICINE | Facility: CLINIC | Age: 70
End: 2025-02-27
Payer: MEDICARE

## 2025-02-27 VITALS
OXYGEN SATURATION: 98 % | WEIGHT: 159 LBS | BODY MASS INDEX: 27.14 KG/M2 | HEIGHT: 64 IN | HEART RATE: 80 BPM | SYSTOLIC BLOOD PRESSURE: 140 MMHG | DIASTOLIC BLOOD PRESSURE: 56 MMHG

## 2025-02-27 DIAGNOSIS — I10 ESSENTIAL HYPERTENSION: Primary | ICD-10-CM

## 2025-02-27 DIAGNOSIS — E11.69 DM TYPE 2 WITH DIABETIC DYSLIPIDEMIA: ICD-10-CM

## 2025-02-27 DIAGNOSIS — E78.5 DYSLIPIDEMIA: ICD-10-CM

## 2025-02-27 DIAGNOSIS — R93.89 ABNORMAL CT SCAN, NECK: ICD-10-CM

## 2025-02-27 DIAGNOSIS — N18.31 STAGE 3A CHRONIC KIDNEY DISEASE: ICD-10-CM

## 2025-02-27 DIAGNOSIS — M48.062 SPINAL STENOSIS OF LUMBAR REGION WITH NEUROGENIC CLAUDICATION: ICD-10-CM

## 2025-02-27 DIAGNOSIS — E78.5 DM TYPE 2 WITH DIABETIC DYSLIPIDEMIA: ICD-10-CM

## 2025-02-27 DIAGNOSIS — K21.9 GASTROESOPHAGEAL REFLUX DISEASE WITHOUT ESOPHAGITIS: ICD-10-CM

## 2025-02-27 LAB — HBA1C MFR BLD: 6.5 %

## 2025-02-27 RX ORDER — OMEPRAZOLE 20 MG/1
20 CAPSULE, DELAYED RELEASE ORAL DAILY
Qty: 90 CAPSULE | Refills: 3 | Status: SHIPPED | OUTPATIENT
Start: 2025-02-27

## 2025-02-27 NOTE — PROGRESS NOTES
SUBJECTIVE:    Patient ID: Rhonda Mojica is a 70 y.o. female.    Chief Complaint: Diabetes (No bottles//Pt is here for a check up and medication refills//Pt would like to discuss intermittent tingling on both of her leg. She stated that when the tingling starts, she loses control her legs//ASCENCION )      History of Present Illness    CHIEF COMPLAINT:  Rhonda presents today for follow up of diabetes, hypertension, and dyslipidemia    NEUROLOGICAL SYMPTOMS:  She reports tingling sensation and weakness in left leg, with a near fall last Saturday night due to left leg giving out. She has a history of previous fall in bathtub due to similar symptoms. MRI in 2023 showed multi-level moderate to severe spinal stenosis with nerve compression due to arthritis, bulging discs, and bone spurs. She saw Dr. Blount once last year and had YAW in August- at f/u visit it was mentioned may consider vertiflex procedure if she continued with issues but she hasn't followed up since then. She denies any back or leg pain, just weakness and tingling, kalina with prolonged standing/walking    UPPER RESPIRATORY SYMPTOMS:  She reports cold symptoms for approximately one week with sensation of mucus caught in throat. She denies chest pain, palpitations, trouble breathing, or wheezing. No fever/chills    ABNORMAL CHEST CT SCAN  Since last visit here she saw Dr. Gonzalez surgery for abnormal CT of the chest which showed oval mass right tracheoesophageal groove measuring 20 x 11 x 28 mm.  Dr. Gonzalez referred her to Dr. Goff ENT.  He had suggested FNA however lesion is not accessible to FNA so she has now been referred to Dr. Mckeon, ENT at [facility] and sees him next week    CARDIOVASCULAR  She had recent follow-up visit with Dr. Varner, cardiology for hypertension.  Echo February 20, 2020 showed moderate concentric hypertrophy with grade 1 diastolic dysfunction, EF 72%, mild aortic valve leaflet thickening with no regurg, trivial MR    BP is slightly  elevated today however she did not take her blood pressure medicine yet today    CURRENT MEDICATIONS:  She takes amlodipine valsartan, gabapentin, clifazide, Jardiance, and weekly Mounjaro injection.      ROS:  General: no fever, no chills, no fatigue, no weight gain, no weight loss  Eyes: no vision changes, no redness, no discharge  ENT: no ear pain, complains of nasal congestion, no sore throat  Cardiovascular: no chest pain, no palpitations, no lower extremity edema  Respiratory: +cough (mild) no shortness of breath, no wheezing  Gastrointestinal: no abdominal pain, no nausea, no vomiting, no diarrhea, no constipation, no blood in stool  Genitourinary: no dysuria, no hematuria, no frequency  Musculoskeletal: no joint pain, no muscle pain,   Skin: no rash, no lesion  Neurological: no headache, no dizziness, no numbness, + tingling, complains of weakness left leg  Psychiatric: no anxiety, no depression, no sleep difficulty              Lab Visit on 12/17/2024   Component Date Value Ref Range Status    TSH 12/17/2024 0.956  0.400 - 4.000 uIU/mL Final    Thyroglobulin, Tumor Marker 12/17/2024 3.5 (H)  ng/mL Final    Thyroglobulin Antibody Screen 12/17/2024 <1.8  <1.8 IU/mL Final    Thyroglobulin Interpretation 12/17/2024 SEE BELOW   Final   Office Visit on 11/20/2024   Component Date Value Ref Range Status    EKG 12-Lead 11/20/2024 Sinus rhythm with marked sinus arrhythmia with 1st degree AV Block   Final    Ventricular Rate 11/20/2024 65   Final    NC Interval 11/20/2024 210   Final    QRS Duration 11/20/2024 114   Final    QT/QTc 11/20/2024 430/447   Final    PRT Axes 11/20/2024 50  -48  79   Final   Hospital Outpatient Visit on 11/07/2024   Component Date Value Ref Range Status    POC Creatinine 11/07/2024 1.2  0.5 - 1.4 mg/dL Final    Sample 11/07/2024 VENOUS   Final   Telephone on 11/05/2024   Component Date Value Ref Range Status    Glucose 11/08/2024 102 (H)  65 - 99 mg/dL Final    BUN 11/08/2024 20  7 - 25  mg/dL Final    Creatinine 2024 1.08 (H)  0.60 - 1.00 mg/dL Final    eGFR 2024 55 (L)  > OR = 60 mL/min/1.73m2 Final    BUN/Creatinine Ratio 2024 19  6 - 22 (calc) Final    Sodium 2024 142  135 - 146 mmol/L Final    Potassium 2024 4.3  3.5 - 5.3 mmol/L Final    Chloride 2024 107  98 - 110 mmol/L Final    CO2 2024 24  20 - 32 mmol/L Final    Calcium 2024 9.2  8.6 - 10.4 mg/dL Final    Total Protein 2024 6.8  6.1 - 8.1 g/dL Final    Albumin 2024 4.2  3.6 - 5.1 g/dL Final    Globulin, Total 2024 2.6  1.9 - 3.7 g/dL (calc) Final    Albumin/Globulin Ratio 2024 1.6  1.0 - 2.5 (calc) Final    Total Bilirubin 2024 0.5  0.2 - 1.2 mg/dL Final    Alkaline Phosphatase 2024 108  37 - 153 U/L Final    AST 2024 14  10 - 35 U/L Final    ALT 2024 14  6 - 29 U/L Final    Cholesterol 2024 144  <200 mg/dL Final    HDL 2024 38 (L)  > OR = 50 mg/dL Final    Triglycerides 2024 109  <150 mg/dL Final    LDL Cholesterol 2024 85  mg/dL (calc) Final    HDL/Cholesterol Ratio 2024 3.8  <5.0 (calc) Final    Non HDL Chol. (LDL+VLDL) 2024 106  <130 mg/dL (calc) Final    Hemoglobin A1C 2024 8.2 (H)  <5.7 % of total Hgb Final       Past Medical History:   Diagnosis Date    DDD (degenerative disc disease), lumbar     DM type 2 with diabetic dyslipidemia     Dyslipidemia     GERD (gastroesophageal reflux disease)     Hypertension      Past Surgical History:   Procedure Laterality Date     SECTION      CHOLECYSTECTOMY  2010    HYSTERECTOMY  1995    TRANSFORAMINAL EPIDURAL INJECTION OF STEROID Bilateral 2024    Procedure: Injection,steroid,epidural,transforaminal approach;  Surgeon: Christopher Blount MD;  Location: [facility];  Service: Pain Management;  Laterality: Bilateral;     Family History   Problem Relation Name Age of Onset    Arthritis Mother Be     Diabetes Mother Be     Hypertension Mother Be   "      Tests to Keep You Healthy    Mammogram: Met on 12/5/2024  Eye Exam: Met on 8/15/2024  Colon Cancer Screening: Met on 11/10/2023  Last Blood Pressure <= 139/89 (2/27/2025): NO  Last HbA1c < 8 (11/08/2024): NO      The 10-year CVD risk score (CHECOAgoino et al., 2008) is: 26.8%    Values used to calculate the score:      Age: 70 years      Sex: Female      Diabetic: Yes      Tobacco smoker: No      Systolic Blood Pressure: 140 mmHg      Is BP treated: Yes      HDL Cholesterol: 38 mg/dL      Total Cholesterol: 144 mg/dL     Marital Status:   Alcohol History:  reports no history of alcohol use.  Tobacco History:  reports that she has never smoked. She has never been exposed to tobacco smoke. She has never used smokeless tobacco.  Drug History:  reports no history of drug use.    Health Maintenance Topics with due status: Not Due       Topic Last Completion Date    Colorectal Cancer Screening 11/10/2023    Diabetes Urine Screening 04/23/2024    Foot Exam 08/01/2024    Diabetic Eye Exam 08/15/2024    Lipid Panel 11/08/2024    Mammogram 12/05/2024    Low Dose Statin 12/17/2024     Immunization History   Administered Date(s) Administered    COVID-19, MRNA, LN-S, PF (MODERNA FULL 0.5 ML DOSE) 03/30/2021, 04/27/2021    Influenza - Quadrivalent - High Dose - PF (65 years and older) 11/20/2020    Influenza - Trivalent - Afluria, Fluzone MDV 08/29/2017    Influenza Split 08/29/2017    Pneumococcal Conjugate - 20 Valent 09/08/2022    Pneumococcal Polysaccharide - 23 Valent 04/28/2016       Review of patient's allergies indicates:  No Known Allergies  Current Medications[1]        Objective:      Vitals:    02/27/25 1336 02/27/25 1341 02/27/25 1357   BP: (!) 158/60 (!) 154/62 (!) 140/56   Pulse: 80     SpO2: 98%     Weight: 72.1 kg (159 lb)     Height: 5' 4" (1.626 m)         Physical Exam  Vitals and nursing note reviewed.   Constitutional:       General: She is not in acute distress.     Appearance: Normal " appearance. She is well-developed. She is not toxic-appearing.   HENT:      Head: Normocephalic and atraumatic.      Right Ear: Tympanic membrane and ear canal normal.      Left Ear: Tympanic membrane and ear canal normal.   Neck:      Thyroid: No thyroid mass or thyromegaly.      Vascular: No carotid bruit.   Cardiovascular:      Rate and Rhythm: Normal rate and regular rhythm.      Heart sounds: Murmur (2nd ICS LSB) heard.      Systolic murmur is present with a grade of 2/6.      No friction rub. No gallop.   Pulmonary:      Effort: Pulmonary effort is normal. No respiratory distress.      Breath sounds: Normal breath sounds. No wheezing or rales.   Abdominal:      Palpations: Abdomen is soft.   Musculoskeletal:      Cervical back: Neck supple.      Right lower leg: No edema.      Left lower leg: No edema.   Lymphadenopathy:      Cervical: No cervical adenopathy.   Skin:     General: Skin is warm and dry.      Findings: No rash.   Neurological:      General: No focal deficit present.      Mental Status: She is alert and oriented to person, place, and time.      Cranial Nerves: Cranial nerves 2-12 are intact. No cranial nerve deficit.      Motor: Motor function is intact.      Gait: Gait normal.      Deep Tendon Reflexes:      Reflex Scores:       Patellar reflexes are 2+ on the right side and 2+ on the left side.  Psychiatric:         Mood and Affect: Affect is flat.         Speech: Speech normal.         Behavior: Behavior normal. Behavior is cooperative.         Cognition and Memory: Cognition normal.          Assessment:       1. Essential hypertension    2. DM type 2 with diabetic dyslipidemia    3. Dyslipidemia    4. Stage 3a chronic kidney disease    5. Abnormal CT scan, neck    6. Lumbar radiculopathy    7. Gastroesophageal reflux disease without esophagitis           Assessment & Plan    - Assessed leg symptoms, likely stemming from multi-level moderate to severe spinal stenosis per 2023 MRI  - Noted  improvement in A1c from 8.2 to 6.5 since November  - Evaluated right tracheoesophageal mass on CT, awaiting specialist consult with Dr. Mckeon  - Performed basic neurological exam, finding good strength in affected leg    TYPE 2 DIABETES MELLITUS WITH DIABETIC DYSLIPIDEMIA:  - Noted significant improvement in patient's A1c from 8.2 in November to 6.5 currently, indicating good adherence to medication regimen and effective diabetes management.  - Advised patient to maintain current management approach.  - Continued multiple medications for diabetes control: gliclazide, Jardiance, and weekly Mounjaro injections.  - Scheduled follow-up visit in 3-4 months for ongoing diabetes management.    HYPERTENSION:  - Current blood pressure measured at 140/56, which is high despite reports of good blood pressure at previous visit.  - Inquired about medication adherence-PATIENT HAS NOT TAKING BLOOD PRESSURE MEDICATION TODAY  - Continued patient on amlodipine valsartan for hypertension.  - Instructed patient to take blood pressure medication before next visit to accurately assess its effectiveness.    STAGE IIIA CKD  -stable on last labs.  Stressed to patient importance of blood pressure control to prevent further renal decline    LUMBAR SPINAL STENOSIS WITH NEUROGENIC CLAUDICATION:  - Rhonda reports tingling in leg and weakness to left leg, including almost falling.  - Performed physical exam of leg strength and gait.  - Reviewed MRI results showing multi-level moderate to severe spinal stenosis and bulging discs in the lumbar region.  - Explained connection between back issues and leg symptoms, including potential for weakness, numbness, and foot drop.  - Advised caution during prolonged standing or walking due to potential worsening of symptoms.  - Recommend follow-up with Dr. Blount or OSMAN Mendez to discuss potential VertiFlex procedure.  - Continued patient on gabapentin for leg pain symptoms.    TRACHEOESOPHAGEAL MASS:  - Noted  presence of right tracheoesophageal mass on CT.  - scheduled to see Dr. Mckeon next week    FOLLOW UP:  - Ellis to contact the office if any issues arise before next appointment.        Plan:       1. Essential hypertension    2. DM type 2 with diabetic dyslipidemia  -     Hemoglobin A1C, POCT    3. Dyslipidemia    4. Stage 3a chronic kidney disease    5. Abnormal CT scan, neck    6. Lumbar radiculopathy    7. Gastroesophageal reflux disease without esophagitis  -     omeprazole (PRILOSEC) 20 MG capsule; Take 1 capsule (20 mg total) by mouth once daily.  Dispense: 90 capsule; Refill: 3      Follow up in about 4 months (around 6/27/2025) for Diabetes.          Counseled on age and gender appropriate medical preventative services, including cancer screenings, immunizations, overall nutritional health, need for a consistent exercise regimen and an overall push towards maintaining a vigorous and active lifestyle.      This note was generated with the assistance of ambient listening technology. Verbal consent was obtained by the patient and accompanying visitor(s) for the recording of patient appointment to facilitate this note. I attest to having reviewed and edited the generated note for accuracy, though some syntax or spelling errors may persist. Please contact the author of this note for any clarification.       2/27/2025 Priyanka Garibay NP           [1]   Current Outpatient Medications:     alcohol swabs (ALCOHOL WIPES) PadM, Apply 1 each topically once daily., Disp: 200 each, Rfl: 1    amlodipine-valsartan (EXFORGE)  mg per tablet, Take 1 tablet by mouth once daily., Disp: 90 tablet, Rfl: 3    aspirin 325 MG tablet, Take 325 mg by mouth once daily. , Disp: , Rfl:     blood sugar diagnostic Strp, To check BG 2 times daily, to use with insurance preferred meter, Disp: 180 strip, Rfl: 3    blood-glucose meter kit, To check BG 2 times daily, to use with insurance preferred meter, Disp: 1 each, Rfl: 0     calcium-vitamin D tablet 600 mg-200 units, Take 1 tablet by mouth once daily. , Disp: , Rfl:     cholecalciferol, vitamin D3, (VITAMIN D3) 2,000 unit Cap, Take 1 capsule by mouth once daily. , Disp: , Rfl:     gabapentin (NEURONTIN) 300 MG capsule, Take 1 capsule in AM and 2 capsules at bedtime, Disp: 270 capsule, Rfl: 3    glipiZIDE (GLUCOTROL) 5 MG tablet, Take 2 tablets (10 mg total) by mouth 2 (two) times daily with meals., Disp: 360 tablet, Rfl: 3    JARDIANCE 25 mg tablet, Take 1 tablet (25 mg total) by mouth once daily., Disp: 90 tablet, Rfl: 3    lancets Misc, To check BG 2 times daily, to use with insurance preferred meter, Disp: 180 each, Rfl: 3    LUMIGAN 0.01 % Drop, Place 1 drop into both eyes every evening., Disp: , Rfl:     meloxicam (MOBIC) 15 MG tablet, Take 1 tablet (15 mg total) by mouth once daily., Disp: 90 tablet, Rfl: 3    metFORMIN (GLUCOPHAGE) 1000 MG tablet, Take 1 tablet (1,000 mg total) by mouth 2 (two) times daily with meals., Disp: 180 tablet, Rfl: 3    metoprolol succinate (TOPROL-XL) 50 MG 24 hr tablet, Take 1 tablet (50 mg total) by mouth once daily., Disp: 90 tablet, Rfl: 3    omeprazole (PRILOSEC) 20 MG capsule, Take 1 capsule (20 mg total) by mouth once daily., Disp: 90 capsule, Rfl: 3    ondansetron (ZOFRAN-ODT) 4 MG TbDL, Take 1 tablet (4 mg total) by mouth every 6 (six) hours as needed (nausea/vomiting)., Disp: 12 tablet, Rfl: 0    pravastatin (PRAVACHOL) 40 MG tablet, Take 1 tablet (40 mg total) by mouth once daily. For cholesterol., Disp: 90 tablet, Rfl: 3    tirzepatide 10 mg/0.5 mL PnIj, Inject 10 mg into the skin every 7 days., Disp: 4 Pen, Rfl: 5    tiZANidine (ZANAFLEX) 4 MG tablet, Take 1 tablet (4 mg total) by mouth nightly as needed (back pain/muscle spasm)., Disp: 90 tablet, Rfl: 3    traMADoL (ULTRAM) 50 mg tablet, Take 1 tablet (50 mg total) by mouth every 6 (six) hours as needed for Pain., Disp: 20 tablet, Rfl: 0

## 2025-02-27 NOTE — PATIENT INSTRUCTIONS
Call Dr. Blount's office at [phone]  to schedule follow up for left leg weakness/lumbar issues

## 2025-03-03 ENCOUNTER — OFFICE VISIT (OUTPATIENT)
Dept: SURGICAL ONCOLOGY | Facility: CLINIC | Age: 70
End: 2025-03-03
Payer: MEDICARE

## 2025-03-03 ENCOUNTER — LAB VISIT (OUTPATIENT)
Dept: LAB | Facility: HOSPITAL | Age: 70
End: 2025-03-03
Attending: OTOLARYNGOLOGY
Payer: MEDICARE

## 2025-03-03 VITALS — BODY MASS INDEX: 27.92 KG/M2 | HEIGHT: 64 IN | WEIGHT: 163.56 LBS

## 2025-03-03 DIAGNOSIS — R93.89 ABNORMAL CT SCAN, NECK: ICD-10-CM

## 2025-03-03 DIAGNOSIS — R93.89 ABNORMAL CT SCAN, NECK: Primary | ICD-10-CM

## 2025-03-03 LAB
CREAT SERPL-MCNC: 1.2 MG/DL (ref 0.5–1.4)
EST. GFR  (NO RACE VARIABLE): 48 ML/MIN/1.73 M^2

## 2025-03-03 PROCEDURE — 3008F BODY MASS INDEX DOCD: CPT | Mod: CPTII,S$GLB,, | Performed by: OTOLARYNGOLOGY

## 2025-03-03 PROCEDURE — 1101F PT FALLS ASSESS-DOCD LE1/YR: CPT | Mod: CPTII,S$GLB,, | Performed by: OTOLARYNGOLOGY

## 2025-03-03 PROCEDURE — 82565 ASSAY OF CREATININE: CPT | Performed by: OTOLARYNGOLOGY

## 2025-03-03 PROCEDURE — 1159F MED LIST DOCD IN RCRD: CPT | Mod: CPTII,S$GLB,, | Performed by: OTOLARYNGOLOGY

## 2025-03-03 PROCEDURE — 36415 COLL VENOUS BLD VENIPUNCTURE: CPT | Performed by: OTOLARYNGOLOGY

## 2025-03-03 PROCEDURE — 3044F HG A1C LEVEL LT 7.0%: CPT | Mod: CPTII,S$GLB,, | Performed by: OTOLARYNGOLOGY

## 2025-03-03 PROCEDURE — 1126F AMNT PAIN NOTED NONE PRSNT: CPT | Mod: CPTII,S$GLB,, | Performed by: OTOLARYNGOLOGY

## 2025-03-03 PROCEDURE — 3288F FALL RISK ASSESSMENT DOCD: CPT | Mod: CPTII,S$GLB,, | Performed by: OTOLARYNGOLOGY

## 2025-03-03 PROCEDURE — 99999 PR PBB SHADOW E&M-EST. PATIENT-LVL III: CPT | Mod: PBBFAC,,, | Performed by: OTOLARYNGOLOGY

## 2025-03-03 PROCEDURE — 1160F RVW MEDS BY RX/DR IN RCRD: CPT | Mod: CPTII,S$GLB,, | Performed by: OTOLARYNGOLOGY

## 2025-03-03 PROCEDURE — 99214 OFFICE O/P EST MOD 30 MIN: CPT | Mod: S$GLB,,, | Performed by: OTOLARYNGOLOGY

## 2025-03-03 NOTE — PROGRESS NOTES
Chief Complaint   Patient presents with    Mass     Right paratracheal mass on CT. Normal PTH level. Radiology did not want to do U/S guided biopsy due to placement of mass.        HPI   70 y.o. female presents for evaluation of a right-sided paratracheal neck mass noted on CT scan of the neck obtained in May of last year.  It was noted to be stable on a follow up CT scan in November of last year.  She has no symptoms.  A request was made for fine-needle aspiration but, per Radiology, this is not feasible.    Review of Systems   Constitutional: Negative for fatigue and unexpected weight change.   HENT: Per HPI.  Eyes: Negative for visual disturbance.   Respiratory: Negative for shortness of breath, hemoptysis   Cardiovascular: Negative for chest pain and palpitations.   Musculoskeletal: Negative for decreased ROM, back pain.   Skin: Negative for rash, sunburn, itching.   Neurological: Negative for dizziness and seizures.   Hematological: Negative for adenopathy. Does not bruise/bleed easily.   Endocrine: Negative for rapid weight loss/weight gain, heat/cold intolerance.     Past Medical History   Problem List[1]        Past Surgical History   Past Surgical History:   Procedure Laterality Date     SECTION      CHOLECYSTECTOMY      HYSTERECTOMY      TRANSFORAMINAL EPIDURAL INJECTION OF STEROID Bilateral 2024    Procedure: Injection,steroid,epidural,transforaminal approach;  Surgeon: Christopher Blount MD;  Location: [facility] OR;  Service: Pain Management;  Laterality: Bilateral;         Family History   Family History   Problem Relation Name Age of Onset    Arthritis Mother Be     Diabetes Mother Be     Hypertension Mother Be            Social History   .Social History[2]      Allergies   Review of patient's allergies indicates:  No Known Allergies        Physical Exam     There were no vitals filed for this visit.      Body mass index is 28.08 kg/m².      General: AOx3, NAD   Respiratory:  Symmetric  chest rise, normal effort  Oral Cavity:  Oral Tongue mobile, no lesions noted. Hard Palate WNL. No buccal or FOM lesions.  Oropharynx:  No masses/lesions of the posterior pharyngeal wall. Tonsillar fossa without lesions. Soft palate without masses. Midline uvula.   Neck: No scars.  No cervical lymphadenopathy, thyromegaly or thyroid nodules.  Normal range of motion.    Face: House Brackmann I bilaterally.     Neck CT reviewed.    Assessment/Plan  Problem List Items Addressed This Visit          Other    Abnormal CT scan, neck - Primary    Paratracheal neck mass.  FNA not feasible per radiology.  It was noted to be stable between her last 2 CT scans.  She has no symptoms.  Parathyroid hormone was normal.  The etiology remains uncertain but it is clinical stability is reassuring that this is likely a benign process.  I recommended that we repeat her CT scan of the neck.  If this lesion remains stable, we could continue observing it or perform an excisional biopsy.  I will contact her once we have CT results.         Relevant Orders    CT Soft Tissue Neck With Contrast    Creatinine, serum (Completed)                    [1]   Patient Active Problem List  Diagnosis    DM type 2 with diabetic dyslipidemia    Dyslipidemia    Gastroesophageal reflux disease    Bilateral carpal tunnel syndrome    Essential hypertension    Stage 3a chronic kidney disease    Abnormal CT scan, neck    Lumbar radiculopathy    Mild depression   [2]   Social History  Socioeconomic History    Marital status:    Tobacco Use    Smoking status: Never     Passive exposure: Never    Smokeless tobacco: Never   Substance and Sexual Activity    Alcohol use: Never    Drug use: Never     Social Drivers of Health     Financial Resource Strain: High Risk (11/18/2024)    Overall Financial Resource Strain (CARDIA)     Difficulty of Paying Living Expenses: Very hard   Food Insecurity: No Food Insecurity (11/18/2024)    Hunger Vital Sign     Worried About  Running Out of Food in the Last Year: Never true     Ran Out of Food in the Last Year: Never true   Transportation Needs: No Transportation Needs (11/18/2024)    TRANSPORTATION NEEDS     Transportation : No   Physical Activity: Inactive (11/18/2024)    Exercise Vital Sign     Days of Exercise per Week: 0 days     Minutes of Exercise per Session: 0 min   Stress: No Stress Concern Present (11/18/2024)    [employer] - Occupational Stress Questionnaire     Feeling of Stress : Not at all   Housing Stability: Low Risk  (2/19/2025)    Housing Stability Vital Sign     Unable to Pay for Housing in the Last Year: No     Homeless in the Last Year: No

## 2025-03-03 NOTE — ASSESSMENT & PLAN NOTE
Paratracheal neck mass.  FNA not feasible per radiology.  It was noted to be stable between her last 2 CT scans.  She has no symptoms.  Parathyroid hormone was normal.  The etiology remains uncertain but it is clinical stability is reassuring that this is likely a benign process.  I recommended that we repeat her CT scan of the neck.  If this lesion remains stable, we could continue observing it or perform an excisional biopsy.  I will contact her once we have CT results.

## 2025-03-05 ENCOUNTER — TELEPHONE (OUTPATIENT)
Dept: FAMILY MEDICINE | Facility: CLINIC | Age: 70
End: 2025-03-05
Payer: MEDICARE

## 2025-03-05 ENCOUNTER — PATIENT MESSAGE (OUTPATIENT)
Dept: ADMINISTRATIVE | Facility: HOSPITAL | Age: 70
End: 2025-03-05
Payer: MEDICARE

## 2025-03-05 NOTE — TELEPHONE ENCOUNTER
----- Message from Iris sent at 3/5/2025 11:26 AM CST -----  Refill on bp pills [facility] [address] [phone]   Collin Nolan is a 39 y.o. female who presents today for Complete Physical  .      She has a history of   Patient Active Problem List   Diagnosis Code    Acquired hypothyroidism E03.9    Obesity E66.9   . Today patient is here for complete physical exam..     Continued R leg IT issues. Will send to PT. Hypothyroidism: Patient is on replacement. Recent TSH is within normal limits. Obesity: Patient has resumed a course of phentermine. Denies any side effects with this. Her weight is overall down 15 pounds. Overall she is feeling well. She is below 200 for the first time in a very long time. Health maintenance hx includes:  Exercise: less active. Form of exercise: still active. Diet: generally follows a low fat low cholesterol diet, nonsmoker, alcohol intake none  Social:  at [employer]. 4th and 5th grade. 10 and 5y/o at home.                        Vegetarian but not vegan. Screening:    Breast cancer screening: N/A   Cervical cancer screening: last PAP/Pelvic exam: scheduled in July  OBGYN: Dr. Mariela Crawford:     Immunization History   Administered Date(s) Administered    Influenza Vaccine (Quad) Mdck Pf 10/31/2018      Immunization status: Tdap today. ROS  Review of Systems   Constitutional: Negative for chills, fever, malaise/fatigue and weight loss. HENT: Negative for congestion, ear discharge, hearing loss, nosebleeds and tinnitus. Eyes: Negative for blurred vision, double vision, photophobia and pain. Respiratory: Negative for cough and shortness of breath. Cardiovascular: Negative for chest pain, palpitations, orthopnea and leg swelling. Gastrointestinal: Negative for abdominal pain, constipation, diarrhea, heartburn, nausea and vomiting. Genitourinary: Negative for dysuria, frequency and urgency. Musculoskeletal: Positive for joint pain. Negative for back pain, falls, myalgias and neck pain.    Skin: Negative for itching and rash. Neurological: Negative. Endo/Heme/Allergies: Does not bruise/bleed easily. Psychiatric/Behavioral: Negative for depression, substance abuse and suicidal ideas. The patient is not nervous/anxious. Visit Vitals  BP 96/62 (BP 1 Location: Left arm, BP Patient Position: Sitting)   Pulse 80   Temp 98.1 °F (36.7 °C) (Oral)   Resp 16   Ht 5' 6\" (1.676 m)   Wt 200 lb (90.7 kg)   SpO2 99%   BMI 32.28 kg/m²       Physical Exam   Constitutional: She is oriented to person, place, and time. She appears well-developed and well-nourished. HENT:   Head: Normocephalic and atraumatic. Cardiovascular: Normal rate and regular rhythm. No murmur heard. Pulmonary/Chest: Effort normal. No respiratory distress. Musculoskeletal:   Full range of motion of right hip. Discomfort in distribution consistent with IT band syndrome   Neurological: She is alert and oriented to person, place, and time. Skin: Skin is warm and dry. Psychiatric: She has a normal mood and affect. Her behavior is normal.         Current Outpatient Medications   Medication Sig    phentermine (ADIPEX-P) 37.5 mg tablet Take 1 Tab by mouth every morning. Max Daily Amount: 37.5 mg.    levothyroxine (SYNTHROID) 75 mcg tablet Take 1 Tab by mouth Daily (before breakfast).  multivitamin (ONE A DAY) tablet Take 1 Tab by mouth daily.  hydrocortisone-pramoxine (PROCTOFOAM HC) rectal foam Insert 1 Applicator into rectum two (2) times a day. No current facility-administered medications for this visit. History reviewed. No pertinent past medical history. History reviewed. No pertinent surgical history.    Social History     Tobacco Use    Smoking status: Never Smoker    Smokeless tobacco: Never Used   Substance Use Topics    Alcohol use: Yes     Comment: 4-5 glasses per year      Family History   Problem Relation Age of Onset    Hypertension Mother     Lung Disease Mother         TB in '01    Elevated Lipids Father  Stroke Maternal Grandmother     Arthritis-osteo Maternal Grandfather     Cancer Maternal Grandfather 80        colon ca    Diabetes Maternal Grandfather     Elevated Lipids Maternal Grandfather         No Known Allergies     Assessment/Plan  Diagnoses and all orders for this visit:    1. Wellness examination -GYN care elsewhere. Collin Nolan was counseled on age-appropriate/ guideline-based risk prevention behaviors and screening for a 39y.o. year old   female . We also discussed adjustments in screening based on family history if necessary. Printed instructions for preventative screening guidelines and healthy behaviors given to patient with after visit summary. 2. Acquired hypothyroidism -stable on current dose. 3. Class 1 obesity in adult, unspecified BMI, unspecified obesity type, unspecified whether serious comorbidity present -congratulated on weight loss. Patient below 200 pounds with the first time in a long time    4. It band syndrome, right -continue to be an issue. Has tried home stretching. This is now preventing her from being physically active. Will send upstairs to PT  -     REFERRAL TO PHYSICAL THERAPY    5. Encounter for immunization  -     NM IMMUNIZ ADMIN,1 SINGLE/COMB VAC/TOXOID  -     TETANUS, DIPHTHERIA TOXOIDS AND ACELLULAR PERTUSSIS VACCINE (TDAP), IN INDIVIDS. >=7, IM        Follow-up and Dispositions    · Return in about 6 months (around 11/24/2019). Blanca Boss MD  5/24/2019    This note was created with the help of speech recognition software Jose Woodruff) and may contain some 'sound alike' errors.

## 2025-03-06 NOTE — TELEPHONE ENCOUNTER
Lab results reviewed and abnormals discussed with physician. Spoke to pt and she needs glipizide wants to sent [facility] [address]

## 2025-03-10 ENCOUNTER — PATIENT MESSAGE (OUTPATIENT)
Dept: ADMINISTRATIVE | Facility: HOSPITAL | Age: 70
End: 2025-03-10
Payer: MEDICARE

## 2025-03-13 ENCOUNTER — HOSPITAL ENCOUNTER (OUTPATIENT)
Dept: RADIOLOGY | Facility: HOSPITAL | Age: 70
Discharge: HOME | End: 2025-03-13
Attending: OTOLARYNGOLOGY
Payer: MEDICARE

## 2025-03-13 DIAGNOSIS — R93.89 ABNORMAL CT SCAN, NECK: ICD-10-CM

## 2025-03-13 PROCEDURE — 70491 CT SOFT TISSUE NECK W/DYE: CPT | Mod: TC,PO

## 2025-03-13 PROCEDURE — 70491 CT SOFT TISSUE NECK W/DYE: CPT | Mod: 26,,, | Performed by: RADIOLOGY

## 2025-03-13 PROCEDURE — 25500020 PHARM REV CODE 255: Mod: PO | Performed by: OTOLARYNGOLOGY

## 2025-03-13 RX ADMIN — IOHEXOL 100 ML: 350 INJECTION, SOLUTION INTRAVENOUS at 10:03

## 2025-03-14 ENCOUNTER — RESULTS FOLLOW-UP (OUTPATIENT)
Dept: OTOLARYNGOLOGY | Facility: CLINIC | Age: 70
End: 2025-03-14

## 2025-03-24 DIAGNOSIS — Z00.00 ENCOUNTER FOR MEDICARE ANNUAL WELLNESS EXAM: ICD-10-CM

## 2025-05-09 ENCOUNTER — TELEPHONE (OUTPATIENT)
Dept: FAMILY MEDICINE | Facility: CLINIC | Age: 70
End: 2025-05-09
Payer: MEDICARE

## 2025-05-09 NOTE — TELEPHONE ENCOUNTER
----- Message from Med Assistant Schofield sent at 5/9/2025 10:36 AM CDT -----  Pt states her feet are tingling/swelling going on for 2 weeks Pt # [phone]

## 2025-05-14 ENCOUNTER — OFFICE VISIT (OUTPATIENT)
Dept: FAMILY MEDICINE | Facility: CLINIC | Age: 70
End: 2025-05-14
Payer: MEDICARE

## 2025-05-14 VITALS
WEIGHT: 161.38 LBS | SYSTOLIC BLOOD PRESSURE: 148 MMHG | DIASTOLIC BLOOD PRESSURE: 62 MMHG | OXYGEN SATURATION: 97 % | BODY MASS INDEX: 27.55 KG/M2 | HEIGHT: 64 IN | HEART RATE: 79 BPM

## 2025-05-14 DIAGNOSIS — Z78.0 MENOPAUSE: ICD-10-CM

## 2025-05-14 DIAGNOSIS — M79.89 LEG SWELLING: ICD-10-CM

## 2025-05-14 DIAGNOSIS — N18.31 STAGE 3A CHRONIC KIDNEY DISEASE: ICD-10-CM

## 2025-05-14 DIAGNOSIS — E11.69 DM TYPE 2 WITH DIABETIC DYSLIPIDEMIA: ICD-10-CM

## 2025-05-14 DIAGNOSIS — M54.16 LUMBAR RADICULOPATHY: ICD-10-CM

## 2025-05-14 DIAGNOSIS — E78.5 DM TYPE 2 WITH DIABETIC DYSLIPIDEMIA: ICD-10-CM

## 2025-05-14 DIAGNOSIS — I10 ESSENTIAL HYPERTENSION: Primary | ICD-10-CM

## 2025-05-14 DIAGNOSIS — Z13.820 SCREENING FOR OSTEOPOROSIS: ICD-10-CM

## 2025-05-14 LAB — HBA1C MFR BLD: 7.2 %

## 2025-05-14 RX ORDER — BLOOD-GLUCOSE,RECEIVER,CONT
1 EACH MISCELLANEOUS DAILY
Qty: 1 EACH | Refills: 0 | Status: SHIPPED | OUTPATIENT
Start: 2025-05-14 | End: 2026-05-14

## 2025-05-14 RX ORDER — AMLODIPINE AND VALSARTAN 5; 320 MG/1; MG/1
1 TABLET ORAL DAILY
Qty: 90 TABLET | Refills: 3 | Status: SHIPPED | OUTPATIENT
Start: 2025-05-14 | End: 2026-05-14

## 2025-05-14 RX ORDER — BLOOD-GLUCOSE SENSOR
1 EACH MISCELLANEOUS
Qty: 3 EACH | Refills: 11 | Status: SHIPPED | OUTPATIENT
Start: 2025-05-14 | End: 2026-05-14

## 2025-05-14 RX ORDER — METOPROLOL SUCCINATE 50 MG/1
TABLET, EXTENDED RELEASE ORAL
Qty: 135 TABLET | Refills: 3 | Status: SHIPPED | OUTPATIENT
Start: 2025-05-14

## 2025-05-14 NOTE — PATIENT INSTRUCTIONS
Reduce amlodipine- valsartan to 5/320 one tablet daily    Increase metoprolol to 1/2 tab in am and 1 tablet at bedtime    Wear compression socks to help with swelling and elevate feet as much as possible    Have bloodwork drawn within the next 2 weeks

## 2025-05-15 NOTE — PROGRESS NOTES
SUBJECTIVE:    Patient ID: Rhonda Mojica is a 70 y.o. female.    Chief Complaint: Numbness (No bottles//Pt is here for a check up and medication refills//Pt would like to discuss some numbness and tingling in both of her feet x 1-2 months. Pt stated that the numbness/tingling is more at night than the day time hours. Also she stated that she is having issue with her balance in the morning.//Pt [facility] this [facility].//dexa scan ordered today//ASCENCION )    Patient here for sick visit.  Patient's main complaint today is bilateral feet swelling for the past month or so.  She also complains of numbness and tingling to her feet which has been somewhat of a chronic issue.  The swelling is more of a new concern.  Her amlodipine/valsartan was increased to 10/320 in November for blood pressure control.  Patient reports she has history of solitary kidney after 1 kidney was removed in 2010 and worries swelling may indicate her kidneys are failing.  Sister unfortunately has a history of diabetes and is on dialysis and most recently had bilateral leg amputations.      She has a history of lumbar DDD- MRI in 2023 showed multi-level moderate to severe spinal stenosis with nerve compression due to arthritis, bulging discs, and bone spurs. She saw Dr. Blount once last year and had YAW in August- at f/u visit it was mentioned may consider vertiflex procedure if she continued with issues.  She reports she is scheduled important with Dr. Silver, pain management but had to cancel appointment as her sister has been ill in the hospital.  Patient complains of bilateral foot numbness worse at night- she is currently taking gabapentin 300 mg in the morning and 600 mg at night.    Patient follows with Dr. Varner for hypertension and AS        Office Visit on 05/14/2025   Component Date Value Ref Range Status    Hemoglobin A1C, POC 05/14/2025 7.2  % Final   Lab Visit on 03/03/2025   Component Date Value Ref Range  Status    Creatinine 2025 1.2  0.5 - 1.4 mg/dL Final    eGFR 2025 48 (A)  >60 mL/min/1.73 m^2 Final   Office Visit on 2025   Component Date Value Ref Range Status    Hemoglobin A1C, POC 2025 6.5  % Final   Lab Visit on 2024   Component Date Value Ref Range Status    TSH 2024 0.956  0.400 - 4.000 uIU/mL Final    Thyroglobulin, Tumor Marker 2024 3.5 (H)  ng/mL Final    Thyroglobulin Antibody Screen 2024 <1.8  <1.8 IU/mL Final    Thyroglobulin Interpretation 2024 SEE BELOW   Final   Office Visit on 2024   Component Date Value Ref Range Status    EKG 12-Lead 2024 Sinus rhythm with marked sinus arrhythmia with 1st degree AV Block   Final    Ventricular Rate 2024 65   Final    [place] Interval 2024 210   Final    QRS Duration 2024 114   Final    QT/QTc 2024 430/447   Final    PRT Axes 2024 50  -48  79   Final       Past Medical History:   Diagnosis Date    DDD (degenerative disc disease), lumbar     DM type 2 with diabetic dyslipidemia     Dyslipidemia     GERD (gastroesophageal reflux disease)     Hypertension      Past Surgical History:   Procedure Laterality Date     SECTION      CHOLECYSTECTOMY      HYSTERECTOMY      TRANSFORAMINAL EPIDURAL INJECTION OF STEROID Bilateral 2024    Procedure: Injection,steroid,epidural,transforaminal approach;  Surgeon: Christopher Blount MD;  [facility];  Service: Pain Management;  Laterality: Bilateral;     Family History   Problem Relation Name Age of Onset    Arthritis Mother Be     Diabetes Mother Be     Hypertension Mother Be        Tests to Keep You Healthy    Mammogram: Met on 2024  Eye Exam: DUE  Colon Cancer Screening: Met on 11/10/2023  Last Blood Pressure <= 139/89 (2025): NO  Last HbA1c < 8 (2025): Yes      The 10-year CVD risk score (Tammy et al., 2008) is: 30.5%    Values used to calculate the score:      Age: 70 years      Sex:  Female      Diabetic: Yes      Tobacco smoker: No      Systolic Blood Pressure: 148 mmHg      Is BP treated: Yes      HDL Cholesterol: 38 mg/dL      Total Cholesterol: 144 mg/dL     Marital Status:   Alcohol History:  reports no history of alcohol use.  Tobacco History:  reports that she has never smoked. She has never been exposed to tobacco smoke. She has never used smokeless tobacco.  Drug History:  reports no history of drug use.    Health Maintenance Topics with due status: Not Due       Topic Last Completion Date    Influenza Vaccine 03/08/2023    Colorectal Cancer Screening 11/10/2023    Foot Exam 08/01/2024    Lipid Panel 11/08/2024    Mammogram 12/05/2024    Low Dose Statin 03/03/2025    Hemoglobin A1c 05/14/2025     Immunization History   Administered Date(s) Administered    COVID-19, MRNA, LN-S, PF (MODERNA FULL 0.5 ML DOSE) 03/30/2021, 04/27/2021    Influenza - Quadrivalent - High Dose - PF (65 years and older) 11/20/2020    Influenza - Trivalent - Afluria, Fluzone MDV 08/29/2017    Influenza Split 08/29/2017    Pneumococcal Conjugate - 20 Valent 09/08/2022    Pneumococcal Polysaccharide - 23 Valent 04/28/2016       Review of patient's allergies indicates:  No Known Allergies  Current Medications[1]    Review of Systems   Constitutional:  Negative for appetite change, chills, fever and unexpected weight change.   Eyes:  Negative for visual disturbance.   Respiratory:  Negative for cough, shortness of breath and wheezing.    Cardiovascular:  Positive for leg swelling. Negative for chest pain and palpitations.   Gastrointestinal:  Negative for abdominal pain, constipation, diarrhea, nausea and vomiting.   Genitourinary:  Positive for frequency. Negative for dysuria, flank pain, hematuria and urgency.   Musculoskeletal:  Positive for back pain (Bilateral leg pain/heaviness R>L). Negative for gait problem and neck pain.   Skin:  Negative for wound.   Neurological:  Positive for numbness (See HPI).  "Negative for dizziness, syncope (no further syncope), speech difficulty, weakness and headaches.          Objective:      Vitals:    05/14/25 1358 05/14/25 1405   BP: (!) 144/60 (!) 148/62   Pulse: 79    SpO2: 97%    Weight: 73.2 kg (161 lb 6.4 oz)    Height: 5' 4" (1.626 m)      Physical Exam  Vitals and nursing note reviewed.   Constitutional:       General: She is not in acute distress.     Appearance: Normal appearance. She is well-developed. She is not toxic-appearing.   HENT:      Head: Normocephalic and atraumatic.   Neck:      Thyroid: No thyroid mass or thyromegaly.   Cardiovascular:      Rate and Rhythm: Normal rate and regular rhythm.      Heart sounds: Murmur (2nd ICS LSB) heard.      Systolic murmur is present with a grade of 2/6.   Pulmonary:      Effort: Pulmonary effort is normal. No respiratory distress.      Breath sounds: Normal breath sounds. No wheezing or rales.   Abdominal:      Palpations: Abdomen is soft.   Musculoskeletal:      Cervical back: Neck supple.      Right lower leg: Edema (1 to 2+ edema bilateral feet and ankles, no erythema) present.      Left lower leg: Edema present.   Skin:     General: Skin is warm and dry.      Findings: No rash.   Neurological:      General: No focal deficit present.      Mental Status: She is alert and oriented to person, place, and time.      Motor: Motor function is intact.      Gait: Gait normal.           Assessment:       1. Essential hypertension    2. Leg swelling    3. Lumbar radiculopathy    4. Stage 3a chronic kidney disease    5. DM type 2 with diabetic dyslipidemia    6. Screening for osteoporosis    7. Menopause           Plan:       1. Essential hypertension  -BP slightly elevated today in the 140s though given feet swelling will reduce amlodipine dose.    -Recommend increasing metoprolol to half a tablet in the morning and continue with 1 tablet in the evening   -follow-up as scheduled in 4 weeks for blood pressure check  -     " amlodipine-valsartan (EXFORGE) 5-320 mg per tablet; Take 1 tablet by mouth once daily.  Dispense: 90 tablet; Refill: 3  -     Comprehensive Metabolic Panel; Future; Expected date: 05/14/2025  -     CBC Auto Differential; Future; Expected date: 05/14/2025  -     Lipid Panel; Future; Expected date: 05/14/2025  -     TSH w/reflex to FT4; Future; Expected date: 05/14/2025  -     Urinalysis, Reflex to Urine Culture Urine, Clean Catch; Future; Expected date: 05/14/2025  -     Microalbumin/Creatinine Ratio, Urine; Future; Expected date: 05/14/2025  -     metoprolol succinate (TOPROL-XL) 50 MG 24 hr tablet; Take 1/2 tablet in AM and 1 tablet at bedtime  Dispense: 135 tablet; Refill: 3    2. Leg swelling  -patient advised leg swelling most likely due to result of increasing amlodipine dose for blood pressure control.  Will reduce amlodipine dose back to 5 mg and recommend knee-high compression socks and elevation.  Low-salt diet      3. Lumbar radiculopathy  -patient advised her feet numbness and leg symptoms are likely due to her lumbar issues.  Advised again I would recommend she follow-up with pain management    4. Stage 3a chronic kidney disease  -recommend updated labs and will check microalbuminuria given swelling.  Continue Jardiance  -     Microalbumin/Creatinine Ratio, Urine; Future; Expected date: 05/14/2025    5. DM type 2 with diabetic dyslipidemia  -A1c up slightly to 7.2%.  Stressed importance of diabetes control to reduce risk of renal failure as well as cardiovascular disease.  -     Hemoglobin A1C, POCT  -     blood-glucose sensor (DEXCOM G7 SENSOR) [place]; 1 each by Misc.(Non-Drug; Combo Route) route every 10 days.  Dispense: 3 each; Refill: 11  -     blood-glucose,,cont (DEXCOM G7 ) Misc; 1 each by Misc.(Non-Drug; Combo Route) route once daily.  Dispense: 1 each; Refill: 0    6. Screening for osteoporosis  -     DXA Bone Density Appendicular Skeleton; Future; Expected date: 05/14/2025    7.  Menopause  -     DXA Bone Density Appendicular Skeleton; Future; Expected date: 05/14/2025        Follow up for as scheduled.  Next month          Counseled on age and gender appropriate medical preventative services, including cancer screenings, immunizations, overall nutritional health, need for a consistent exercise regimen and an overall push towards maintaining a vigorous and active lifestyle.      5/14/2025 Priyanka Garibay NP           [1]   Current Outpatient Medications:     alcohol swabs (ALCOHOL WIPES) PadM, Apply 1 each topically once daily., Disp: 200 each, Rfl: 1    amlodipine-valsartan (EXFORGE) 5-320 mg per tablet, Take 1 tablet by mouth once daily., Disp: 90 tablet, Rfl: 3    aspirin 325 MG tablet, Take 325 mg by mouth once daily. , Disp: , Rfl:     blood sugar diagnostic Strp, To check BG 2 times daily, to use with insurance preferred meter, Disp: 180 strip, Rfl: 3    blood-glucose meter kit, To check BG 2 times daily, to use with insurance preferred meter, Disp: 1 each, Rfl: 0    blood-glucose sensor (DEXCOM G7 SENSOR) [place], 1 each by Misc.(Non-Drug; Combo Route) route every 10 days., Disp: 3 each, Rfl: 11    blood-glucose,,cont (DEXCOM G7 ) Misc, 1 each by Misc.(Non-Drug; Combo Route) route once daily., Disp: 1 each, Rfl: 0    calcium-vitamin D tablet 600 mg-200 units, Take 1 tablet by mouth once daily. , Disp: , Rfl:     cholecalciferol, vitamin D3, (VITAMIN D3) 2,000 unit Cap, Take 1 capsule by mouth once daily. , Disp: , Rfl:     gabapentin (NEURONTIN) 300 MG capsule, Take 1 capsule in AM and 2 capsules at bedtime, Disp: 270 capsule, Rfl: 3    glipiZIDE (GLUCOTROL) 5 MG tablet, Take 2 tablets (10 mg total) by mouth 2 (two) times daily with meals., Disp: 360 tablet, Rfl: 3    JARDIANCE 25 mg tablet, Take 1 tablet (25 mg total) by mouth once daily., Disp: 90 tablet, Rfl: 3    lancets Misc, To check BG 2 times daily, to use with insurance preferred meter, Disp: 180 each, Rfl: 3     LUMIGAN 0.01 % Drop, Place 1 drop into both eyes every evening., Disp: , Rfl:     meloxicam (MOBIC) 15 MG tablet, Take 1 tablet (15 mg total) by mouth once daily., Disp: 90 tablet, Rfl: 3    metFORMIN (GLUCOPHAGE) 1000 MG tablet, Take 1 tablet (1,000 mg total) by mouth 2 (two) times daily with meals., Disp: 180 tablet, Rfl: 3    metoprolol succinate (TOPROL-XL) 50 MG 24 hr tablet, Take 1/2 tablet in AM and 1 tablet at bedtime, Disp: 135 tablet, Rfl: 3    omeprazole (PRILOSEC) 20 MG capsule, Take 1 capsule (20 mg total) by mouth once daily., Disp: 90 capsule, Rfl: 3    ondansetron (ZOFRAN-ODT) 4 MG TbDL, Take 1 tablet (4 mg total) by mouth every 6 (six) hours as needed (nausea/vomiting)., Disp: 12 tablet, Rfl: 0    pravastatin (PRAVACHOL) 40 MG tablet, Take 1 tablet (40 mg total) by mouth once daily. For cholesterol., Disp: 90 tablet, Rfl: 3    tirzepatide 10 mg/0.5 mL PnIj, Inject 10 mg into the skin every 7 days., Disp: 4 Pen, Rfl: 5    tiZANidine (ZANAFLEX) 4 MG tablet, Take 1 tablet (4 mg total) by mouth nightly as needed (back pain/muscle spasm)., Disp: 90 tablet, Rfl: 3    traMADoL (ULTRAM) 50 mg tablet, Take 1 tablet (50 mg total) by mouth every 6 (six) hours as needed for Pain., Disp: 20 tablet, Rfl: 0

## 2025-05-21 ENCOUNTER — PATIENT OUTREACH (OUTPATIENT)
Dept: ADMINISTRATIVE | Facility: HOSPITAL | Age: 70
End: 2025-05-21
Payer: MEDICARE

## 2025-05-21 NOTE — LETTER
AUTHORIZATION FOR RELEASE OF   CONFIDENTIAL INFORMATION    [facility] ,    We are seeing Rhonda Mojica, date of birth 1955, in the clinic at [facility]. Priyanka Garibay NP is the patient's PCP. Rhonda Mojica has an outstanding lab/procedure at the time we reviewed her chart. In order to help keep her health information updated, she has authorized us to request the following medical record(s):                                  ( X )  EYE EXAM ( Most Recent )             Please fax records to [facility], Melerine, Linda T., NP,  at [phone] or email to [email or web address].       If you have any questions, please contact     Vinayak HARMAN  Clinical Care Coordinator    [facility]  [phone] (Phone)  [phone] (Fax)      Patient Name: Rhonda Mojica  : 1955  Patient Phone #: [phone]                Rhonda Mojica  MRN: [id number]  : 1955  Age: 70 y.o.  Sex: female         Patient/Legal Guardian Signature  This signature was collected at 2025    ms     Self  _______________________________   Printed Name/Relationship to Patient      Consent for Examination and Treatment: I hereby authorize the providers and employees of [facility] ([facility]) to provide medical treatment/services which includes, but is not limited to, performing and administering tests and diagnostic procedures that are deemed necessary, including, but not limited to, imaging examinations, blood tests and other laboratory procedures as may be required by the hospital, clinic, or may be ordered by my physician(s) or persons working under the general and/or special instructions of my physician(s).      I understand and agree that this consent covers all authorized persons, including but not limited to physicians, residents, nurse practitioners, physicians' assistants, specialists, consultants, student nurses, and independently contracted  physicians, who are called upon by the physician in charge, to carry out the diagnostic procedures and medical or surgical treatment.     I hereby authorize [facility] to retain or dispose of any specimens or tissue, should there be such remaining from any test or procedure.     I hereby authorize and give consent for [facility] providers and employees to take photographs, images or videotapes of such diagnostic, surgical or treatment procedures of Patient as may be required by [facility] or as may be ordered by a physician. I further acknowledge and agree that [facility] may use cameras or other devices for patient monitoring.     I am aware that the practice of medicine is not an exact science, and I acknowledge that no guarantees have been made to me as to the outcome of any tests, procedures or treatment.     Authorization for Release of Information: I understand that my insurance company and/or their agents may need information necessary to make determinations about payment/reimbursement. I hereby provide authorization to release to all insurance companies, their successors, assignees, other parties with whom they may have contracted, or others acting on their behalf, that are involved with payment for any hospital and/or clinic charges incurred by the patient, any information that they request and deem necessary for payment/reimbursement, and/or quality review.  I further authorize the release of my health information to physicians or other health care practitioners on staff who are involved in my health care now and in the future, and to other health care providers, entities, or institutions for the purpose of my continued care and treatment, including referrals.     REGISTRATION AUTHORIZATION  Form No. 89738 (Rev. 3/25/2024)    Page 1 of 3                       Medicare Patient's Certification and Authorization to Release Information and Payment Request:  I certify that the information given by me in applying for  payment under Title XVIII of the Social Security Act is correct. I authorize any alcaraz of medical or other information about me to release to the Social SecurityAdministration, or its intermediaries or carriers, any information needed for this or a related Medicare claim. I request that payment of authorized benefits be made on my behalf.     Assignment of Insurance Benefits:   I hereby authorize any and all insurance companies, health plans, defined   benefit plans, health insurers or any entity that is or may be responsible for payment of my medical expenses to pay all hospital and medical benefits now due, and to become due and payable to me under any hospital benefits, sick benefits, injury benefits or any other benefit for services rendered to me, including Major Medical Benefits, direct to [facility] and all independently contracted physicians. I assign any and all rights that I may have against any and all insurance companies, health plans, defined benefit plans, health insurers or any entity that is or may be responsible for payment of my medical expenses, including, but not limited to any right to appeal a denial of a claim, any right to bring any action, lawsuit, administrative proceeding, or other cause of action on my behalf. I specifically assign my right to pursue litigation against any and all insurance companies, health plans, defined benefit plans, health insurers or any entity that is or may be responsible for payment of my medical expenses based upon a refusal to pay charges.            E. Valuables: It is understood and agreed that [facility] is not liable for the damage to or loss of any money, jewelry,   documents, dentures, eye glasses, hearing aids, prosthetics, or other property of value.     F. Computer Equipment: I understand and agree that should I choose to use computer equipment owned by [facility] or if I choose to access the Internet via [facility]s network, I do so at my own risk. [facility] is  not responsible for any damage to my computer equipment or to any damages of any type that might arise from my loss of equipment or data.     G. Acceptance of Financial Responsibility:  I agree that in consideration of the services and   supplies that have been   or will be furnished to the patient, I am hereby obligated to pay all charges made for or on the account of the patient according to the standard rates (in effect at the time the services and supplies are delivered) established by [facility], including its Patient Financial Assistance Policy to the extent it is applicable. I understand that I am responsible for all charges, or portions thereof, not covered by insurance or other sources. Patient refunds will be distributed only after balances at all [facility] facilities are paid.     H. Communication Authorization:  I hereby authorize [facility] and its representatives, along with any billing service   or  who may work on their behalf, to contact me on   my cell phone and/or home phone using pre- recorded messages, artificial voice messages, automatic telephone dialing devices or other computer assisted technology, or by electronic      mail, text messaging, or by any other form of electronic communication. This includes, but is not limited to, appointment reminders, yearly physical exam reminders, preventive care reminders, patient campaigns, welcome calls, and calls about account balances on my account or any account on which I am listed as a guarantor. I understand I have the right to opt out of these communications at any time.      Relationship  Between  Facility and  Provider:      I understand that some, but not all, providers furnishing services to the patient are not employees or agents of [facility]. The patient is under the care and supervision of his/her attending physician, and it is the responsibility of the facility and its nursing staff to carry out the instructions of such physicians.  It is the responsibility of the patient's physician/designee to obtain the patient's informed consent, when required, for medical or surgical treatment, special diagnostic or therapeutic procedures, or hospital services rendered for the patient under the special instructions of the physician/designee.           REGISTRATION AUTHORIZATION  Form No. 21496 (Rev. 3/25/2024)    Page 2 of 3                       Immunizations: [facility] shares immunization information with state sponsored health departments to help you and your doctor keep track of your immunization records. By signing, you consent to have this information shared with the health department in your state:                                [place] - LINKS ([place] Immunization Network for Kids Statewide)                                [place] - MIIX ([place] Immunization Information eXchange)                                [place] - ImmPRINT (Immunization Patient Registry with Integrated Technology)     TERM: This authorization is valid for this and subsequent care/treatment I receive at [facility] and will remain valid unless/until revoked in writing by me.     [facility]: As used in this document, [facility] means all [facility] owned and managed facilities, including, but not limited to, all health centers, surgery centers, clinics, urgent care centers, and hospitals.         [facility] complies with applicable Federal civil rights laws and does not discriminate on the basis of race, color, national origin, age, disability, or sex.  ATENCIÓN: si nettiela davion, tiene a blakely disposición servicios gratuitos de asistencia lingüística. Llame al [phone].  CHÚ Ý: N?u b?n nói Ti?ng Vi?t, có các d?ch v? h? tr? ngôn ng? mi?n phí dành cho b?n. G?i s? [phone].        REGISTRATION AUTHORIZATION  Form No. 23492 (Rev. 3/25/2024)   Page 3 of 3

## 2025-05-21 NOTE — PROGRESS NOTES
Care Coordination Encounter Details:       MyChart Portal Status:         []  Reviewed MyChart Portal Status offered / enrolled if applicable        Additional Notes:             Updates Requested / Reviewed:        Updated Care Coordination Note, Care Everywhere, , External Sources: [facility] and [facility], Care Team Updated, and Immunizations Reconciliation Completed or Queried: [place]         Health Maintenance Screening(s) Due:      Health Maintenance Topics Overdue:      VBHM Score: 3     Urine Screening  Eye Exam  Uncontrolled BP    Tetanus Vaccine  Shingles/Zoster Vaccine  RSV Vaccine                  Health Maintenance Topic(s) Outreach Outcomes & Actions Taken:    Eye Exam - Outreach Outcomes & Actions Taken  : External Records Requested & Care Team Updated if Applicable    Lab(s) - Outreach Outcomes & Actions Taken  : Reminder added to appt notes.    Blood Pressure - Outreach Outcomes & Actions Taken  : Pt portal message sent.           Additional Notes:  Called regarding remote b/p reading, left voice message.           Chronic Disease Management:     Diabetes Measures        Lab Results   Component Value Date    HGBA1C 8.2 (H) 11/08/2024           [x]  Reviewed chart for active Diabetes diagnosis     []  Scheduled necessary follow up appointments if needed         Additional Notes:             Hypertension Measures        BP Readings from Last 1 Encounters:   05/14/25 (!) 148/62           [x]  Reviewed chart for active Hypertension diagnosis     []  Reviewed & documented Home BP Cuff     []  Documented a Remote BP if needed & applicable     []  Scheduled necessary follow up appointments with Primary Care if needed         Additional Notes:             Provider Team Continuity:     Last PCP Visit Date: 5/14/2025          [x]  Reviewed Primary Care Provider Visits, Annual Wellness Visit, and Future          Appointments to ensure appointments have been scheduled and/or           completed         Additional Notes:             Social Determinants of Health          [x]  Reviewed, completed, and/or updated the following sections:                  Food Insecurity, Transportation Needs, Financial Resource Strain,                 Tobacco Use        Additional Notes:  [facility] RFL was placed on 11-18-24, was outreached by [facility] on 12-4-24.           Care Management, Digital Medicine, and/or Education Referrals    [facility] Risk Score: 34.9         Next Steps - Referral Actions: Digital Medicine Outcomes and Actions Taken: Pt Declined or Not Eligible        Additional Notes:

## 2025-05-22 ENCOUNTER — HOSPITAL ENCOUNTER (OUTPATIENT)
Dept: RADIOLOGY | Facility: HOSPITAL | Age: 70
Discharge: HOME | End: 2025-05-22
Attending: NURSE PRACTITIONER
Payer: MEDICARE

## 2025-05-22 ENCOUNTER — RESULTS FOLLOW-UP (OUTPATIENT)
Dept: FAMILY MEDICINE | Facility: CLINIC | Age: 70
End: 2025-05-22

## 2025-05-22 DIAGNOSIS — Z13.820 SCREENING FOR OSTEOPOROSIS: ICD-10-CM

## 2025-05-22 DIAGNOSIS — Z78.0 MENOPAUSE: ICD-10-CM

## 2025-05-22 PROCEDURE — 77080 DXA BONE DENSITY AXIAL: CPT | Mod: TC,PO

## 2025-05-22 PROCEDURE — 77080 DXA BONE DENSITY AXIAL: CPT | Mod: 26,,, | Performed by: RADIOLOGY

## 2025-05-25 ENCOUNTER — PATIENT OUTREACH (OUTPATIENT)
Dept: ADMINISTRATIVE | Facility: HOSPITAL | Age: 70
End: 2025-05-25
Payer: MEDICARE

## 2025-05-29 ENCOUNTER — RESULTS FOLLOW-UP (OUTPATIENT)
Dept: FAMILY MEDICINE | Facility: CLINIC | Age: 70
End: 2025-05-29
Payer: MEDICARE

## 2025-06-02 ENCOUNTER — TELEPHONE (OUTPATIENT)
Dept: FAMILY MEDICINE | Facility: CLINIC | Age: 70
End: 2025-06-02
Payer: MEDICARE

## 2025-06-03 ENCOUNTER — TELEPHONE (OUTPATIENT)
Dept: FAMILY MEDICINE | Facility: CLINIC | Age: 70
End: 2025-06-03
Payer: MEDICARE

## 2025-06-03 RX ORDER — FLUCONAZOLE 150 MG/1
150 TABLET ORAL DAILY
Qty: 1 TABLET | Refills: 0 | Status: SHIPPED | OUTPATIENT
Start: 2025-06-03 | End: 2025-06-04

## 2025-06-27 DIAGNOSIS — E78.5 DYSLIPIDEMIA: ICD-10-CM

## 2025-06-27 RX ORDER — PRAVASTATIN SODIUM 40 MG/1
40 TABLET ORAL DAILY
Qty: 90 TABLET | Refills: 3 | Status: SHIPPED | OUTPATIENT
Start: 2025-06-27 | End: 2026-06-27

## 2025-07-21 ENCOUNTER — OFFICE VISIT (OUTPATIENT)
Dept: FAMILY MEDICINE | Facility: CLINIC | Age: 70
End: 2025-07-21
Payer: MEDICARE

## 2025-07-21 VITALS
HEIGHT: 64 IN | BODY MASS INDEX: 27.31 KG/M2 | WEIGHT: 160 LBS | OXYGEN SATURATION: 97 % | HEART RATE: 78 BPM | SYSTOLIC BLOOD PRESSURE: 136 MMHG | DIASTOLIC BLOOD PRESSURE: 76 MMHG

## 2025-07-21 DIAGNOSIS — E11.65 UNCONTROLLED TYPE 2 DIABETES MELLITUS WITH HYPERGLYCEMIA: Primary | ICD-10-CM

## 2025-07-21 DIAGNOSIS — M54.16 LUMBAR RADICULOPATHY: ICD-10-CM

## 2025-07-21 DIAGNOSIS — E78.5 DYSLIPIDEMIA: ICD-10-CM

## 2025-07-21 DIAGNOSIS — B37.31 VAGINAL CANDIDIASIS: ICD-10-CM

## 2025-07-21 DIAGNOSIS — N18.31 STAGE 3A CHRONIC KIDNEY DISEASE: ICD-10-CM

## 2025-07-21 DIAGNOSIS — R93.89 ABNORMAL CT SCAN, NECK: ICD-10-CM

## 2025-07-21 DIAGNOSIS — I10 ESSENTIAL HYPERTENSION: ICD-10-CM

## 2025-07-21 LAB — HBA1C MFR BLD: 8.5 %

## 2025-07-21 PROCEDURE — 3008F BODY MASS INDEX DOCD: CPT | Mod: CPTII,S$GLB,, | Performed by: NURSE PRACTITIONER

## 2025-07-21 PROCEDURE — 1159F MED LIST DOCD IN RCRD: CPT | Mod: CPTII,S$GLB,, | Performed by: NURSE PRACTITIONER

## 2025-07-21 PROCEDURE — 3288F FALL RISK ASSESSMENT DOCD: CPT | Mod: CPTII,S$GLB,, | Performed by: NURSE PRACTITIONER

## 2025-07-21 PROCEDURE — 4010F ACE/ARB THERAPY RXD/TAKEN: CPT | Mod: CPTII,S$GLB,, | Performed by: NURSE PRACTITIONER

## 2025-07-21 PROCEDURE — 1101F PT FALLS ASSESS-DOCD LE1/YR: CPT | Mod: CPTII,S$GLB,, | Performed by: NURSE PRACTITIONER

## 2025-07-21 PROCEDURE — 3061F NEG MICROALBUMINURIA REV: CPT | Mod: CPTII,S$GLB,, | Performed by: NURSE PRACTITIONER

## 2025-07-21 PROCEDURE — 3075F SYST BP GE 130 - 139MM HG: CPT | Mod: CPTII,S$GLB,, | Performed by: NURSE PRACTITIONER

## 2025-07-21 PROCEDURE — G2211 COMPLEX E/M VISIT ADD ON: HCPCS | Mod: S$GLB,,, | Performed by: NURSE PRACTITIONER

## 2025-07-21 PROCEDURE — 1126F AMNT PAIN NOTED NONE PRSNT: CPT | Mod: CPTII,S$GLB,, | Performed by: NURSE PRACTITIONER

## 2025-07-21 PROCEDURE — 3066F NEPHROPATHY DOC TX: CPT | Mod: CPTII,S$GLB,, | Performed by: NURSE PRACTITIONER

## 2025-07-21 PROCEDURE — 3078F DIAST BP <80 MM HG: CPT | Mod: CPTII,S$GLB,, | Performed by: NURSE PRACTITIONER

## 2025-07-21 PROCEDURE — 1160F RVW MEDS BY RX/DR IN RCRD: CPT | Mod: CPTII,S$GLB,, | Performed by: NURSE PRACTITIONER

## 2025-07-21 PROCEDURE — 99214 OFFICE O/P EST MOD 30 MIN: CPT | Mod: S$GLB,,, | Performed by: NURSE PRACTITIONER

## 2025-07-21 PROCEDURE — 83036 HEMOGLOBIN GLYCOSYLATED A1C: CPT | Mod: QW,,, | Performed by: NURSE PRACTITIONER

## 2025-07-21 PROCEDURE — 3052F HG A1C>EQUAL 8.0%<EQUAL 9.0%: CPT | Mod: CPTII,S$GLB,, | Performed by: NURSE PRACTITIONER

## 2025-07-21 RX ORDER — FLUCONAZOLE 150 MG/1
TABLET ORAL
Qty: 2 TABLET | Refills: 0 | Status: SHIPPED | OUTPATIENT
Start: 2025-07-21

## 2025-07-21 NOTE — PROGRESS NOTES
SUBJECTIVE:    Patient ID: Rhonda Mojica is a 70 y.o. female.    Chief Complaint: Diabetes (No bottles//Pt is here for a check up and medication refills//Foot exam ordered today//ASCENCION )      History of Present Illness    Patient presents today for follow up of hypertension, diabetes mellitus, and dyslipidemia.    Pt reports overall doing okay. Leg swelling has improved since last visit when amlodipine dose was reduced in May    Her A1C has increased to 8.5 from previous 7.2 in May. She acknowledges sometimes forgetting to take diabetes medications and has been off Mounjaro for approximately two weeks. Current diabetes medication regimen includes Glipizide two tablets twice daily, Jardiance daily, and Metformin 1000 mg twice daily. She admits to not adhering strictly to recommended diet. She performs home glucose monitoring but notes inconsistent readings due to dietary non-compliance. Dexcom CGM was ordered at last visit but she reports pharmacy didn't fill- not sure why. She reports she's been under stress taking care of her grandkids as well as her sister who recently had both legs amputated.     She experiences occasional tingling sensations and describes foot symptoms with heavy tingling, particularly at nighttime, which may be related to diabetic neuropathy. She also has hx of lumbar DDD and describes leg heaviness symptoms which have improved some since she had back injections.     She saw SULEMA Arrieta in March for neck nodule. CT showed an unchanged right paratracheal node, located posterior inferior to the right thyroid lobe. The nodule remains indeterminate in origin, potentially related to thyroid, parathyroid, or lymph node. ENT recommended continued surveillance with repeat imaging in six months, as radiology indicated difficulty accessing the nodule for biopsy. She understands the current management plan is to monitor the nodule for any changes.    She endorses external genital itching and confirms  history of yeast infection in May, which was previously treated. She denies current vaginal discharge and acknowledges that elevated blood sugar may contribute to increased risk of yeast infections.     Has f/u with Dr. Varner next month for HTN    ROS:  General: no fever, no chills, no fatigue, no weight gain, no weight loss  Eyes: no vision changes, no redness, no discharge  ENT: no ear pain, no nasal congestion, no sore throat  Cardiovascular: no chest pain, no palpitations, no lower extremity edema  Respiratory: no cough, no shortness of breath  Gastrointestinal: no abdominal pain, no nausea, no vomiting, no diarrhea, no constipation, no blood in stool  Genitourinary: no dysuria, no hematuria, no frequency, complains of urinary incontinence  Musculoskeletal: no joint pain, no muscle pain, complains of limb pain  Skin: no rash, no lesion  Neurological: no headache, no dizziness, no numbness, complains of tingling  Psychiatric: no anxiety, no depression, no sleep difficulty  Female Genitourinary: complains of vaginal itching or burning      Office Visit on 07/21/2025   Component Date Value Ref Range Status    Hemoglobin A1C, POC 07/21/2025 8.5  % Final   Patient Outreach on 05/25/2025   Component Date Value Ref Range Status    Left Eye DM Retinopathy 02/13/2025 Positive (A)  Negative Final    Right Eye DM Retinopathy 02/13/2025 Positive (A)  Negative Final   Office Visit on 05/14/2025   Component Date Value Ref Range Status    Hemoglobin A1C, POC 05/14/2025 7.2  % Final    Glucose 05/21/2025 116 (H)  65 - 99 mg/dL Final    BUN 05/21/2025 20  7 - 25 mg/dL Final    Creatinine 05/21/2025 1.09 (H)  0.60 - 1.00 mg/dL Final    eGFR 05/21/2025 54 (L)  > OR = 60 mL/min/1.73m2 Final    BUN/Creatinine Ratio 05/21/2025 18  6 - 22 (calc) Final    Sodium 05/21/2025 141  135 - 146 mmol/L Final    Potassium 05/21/2025 4.7  3.5 - 5.3 mmol/L Final    Chloride 05/21/2025 107  98 - 110 mmol/L Final    CO2 05/21/2025 27  20 - 32  mmol/L Final    Calcium 05/21/2025 9.5  8.6 - 10.4 mg/dL Final    Total Protein 05/21/2025 6.9  6.1 - 8.1 g/dL Final    Albumin 05/21/2025 4.1  3.6 - 5.1 g/dL Final    Globulin, Total 05/21/2025 2.8  1.9 - 3.7 g/dL (calc) Final    Albumin/Globulin Ratio 05/21/2025 1.5  1.0 - 2.5 (calc) Final    Total Bilirubin 05/21/2025 0.4  0.2 - 1.2 mg/dL Final    Alkaline Phosphatase 05/21/2025 118  37 - 153 U/L Final    AST 05/21/2025 13  10 - 35 U/L Final    ALT 05/21/2025 13  6 - 29 U/L Final    WBC 05/21/2025 9.9  3.8 - 10.8 Thousand/uL Final    RBC 05/21/2025 5.67 (H)  3.80 - 5.10 Million/uL Final    Hemoglobin 05/21/2025 12.4  11.7 - 15.5 g/dL Final    Hematocrit 05/21/2025 38.9  35.0 - 45.0 % Final    MCV 05/21/2025 68.6 (L)  80.0 - 100.0 fL Final    MCH 05/21/2025 21.9 (L)  27.0 - 33.0 pg Final    MCHC 05/21/2025 31.9 (L)  32.0 - 36.0 g/dL Final    RDW 05/21/2025 17.6 (H)  11.0 - 15.0 % Final    Platelets 05/21/2025 313  140 - 400 Thousand/uL Final    MPV 05/21/2025 10.8  7.5 - 12.5 fL Final    Neutrophils, Abs 05/21/2025 6,158  1,500 - 7,800 cells/uL Final    Lymph # 05/21/2025 2,287  850 - 3,900 cells/uL Final    Mono # 05/21/2025 901  200 - 950 cells/uL Final    Eos # 05/21/2025 485  15 - 500 cells/uL Final    Baso # 05/21/2025 69  0 - 200 cells/uL Final    Neutrophils Relative 05/21/2025 62.2  % Final    Lymph % 05/21/2025 23.1  % Final    Mono % 05/21/2025 9.1  % Final    Eosinophil % 05/21/2025 4.9  % Final    Basophil % 05/21/2025 0.7  % Final    CBC Morphology 05/21/2025 SEE COMMENT  NORMAL Final    Cholesterol 05/21/2025 120  <200 mg/dL Final    HDL 05/21/2025 40 (L)  > OR = 50 mg/dL Final    Triglycerides 05/21/2025 87  <150 mg/dL Final    LDL Cholesterol 05/21/2025 63  mg/dL (calc) Final    HDL/Cholesterol Ratio 05/21/2025 3.0  <5.0 (calc) Final    Non HDL Chol. (LDL+VLDL) 05/21/2025 80  <130 mg/dL (calc) Final    TSH w/reflex to FT4 05/21/2025 1.16  0.40 - 4.50 mIU/L Final    Color, UA 05/21/2025 YELLOW   YELLOW Final    Appearance, UA 2025 CLEAR  CLEAR Final    Specific Gravity, UA 2025 1.023  1.001 - 1.035 Final    pH, UA 2025 6.0  5.0 - 8.0 Final    Glucose, UA 2025 3+ (A)  NEGATIVE Final    Bilirubin, UA 2025 NEGATIVE  NEGATIVE Final    Ketones, UA 2025 NEGATIVE  NEGATIVE Final    Occult Blood UA 2025 NEGATIVE  NEGATIVE Final    Protein, UA 2025 NEGATIVE  NEGATIVE Final    Nitrite, UA 2025 NEGATIVE  NEGATIVE Final    Leukocytes, UA 2025 1+ (A)  NEGATIVE Final    WBC Casts, UA 2025 NONE SEEN  < OR = 5 /HPF Final    RBC Casts, UA 2025 NONE SEEN  < OR = 2 /HPF Final    Squam Epithel, UA 2025 0-5  < OR = 5 /HPF Final    Bacteria, UA 2025 NONE SEEN  NONE SEEN /HPF Final    Hyaline Casts, UA 2025 0-5 (A)  NONE SEEN /LPF Final    Service Cmt: 2025 SEE COMMENT   Final    Reflexive Urine Culture 2025 SEE COMMENT   Final    Urine Culture, Routine 2025 SEE COMMENT (A)   Final    Creatinine, Urine 2025 52  20 - 275 mg/dL Final    Microalb, Ur 2025 0.4  See Note: mg/dL Final    Microalb/Creat Ratio 2025 8  <30 mg/g creat Final   Lab Visit on 2025   Component Date Value Ref Range Status    Creatinine 2025 1.2  0.5 - 1.4 mg/dL Final    eGFR 2025 48 (A)  >60 mL/min/1.73 m^2 Final   Office Visit on 2025   Component Date Value Ref Range Status    Hemoglobin A1C, POC 2025 6.5  % Final       Past Medical History:   Diagnosis Date    DDD (degenerative disc disease), lumbar     DM type 2 with diabetic dyslipidemia     Dyslipidemia     GERD (gastroesophageal reflux disease)     Hypertension     Mild non proliferative diabetic retinopathy      Past Surgical History:   Procedure Laterality Date     SECTION      CHOLECYSTECTOMY      HYSTERECTOMY      nephrectomy Left     TRANSFORAMINAL EPIDURAL INJECTION OF STEROID Bilateral 2024    Procedure:  Injection,steroid,epidural,transforaminal approach;  Surgeon: Christopher Blount MD;  Location: [facility];  Service: Pain Management;  Laterality: Bilateral;     Family History   Problem Relation Name Age of Onset    Arthritis Mother Be     Diabetes Mother Be     Hypertension Mother Be        Tests to Keep You Healthy    Mammogram: Met on 12/5/2024  Eye Exam: Met on 2/13/2025  Colon Cancer Screening: Met on 11/10/2023  Last Blood Pressure <= 139/89 (7/21/2025): NO  Last HbA1c < 8 (07/21/2025): NO      The 10-year CVD risk score (Violetaino et al., 2008) is: 19.9%    Values used to calculate the score:      Age: 70 years      Sex: Female      Diabetic: Yes      Tobacco smoker: No      Systolic Blood Pressure: 136 mmHg      Is BP treated: Yes      HDL Cholesterol: 40 mg/dL      Total Cholesterol: 120 mg/dL     Marital Status:   Alcohol History:  reports no history of alcohol use.  Tobacco History:  reports that she has never smoked. She has never been exposed to tobacco smoke. She has never used smokeless tobacco.  Drug History:  reports no history of drug use.    Health Maintenance Topics with due status: Not Due       Topic Last Completion Date    Influenza Vaccine 03/08/2023    Colorectal Cancer Screening 11/10/2023    Mammogram 12/05/2024    Diabetic Eye Exam 02/13/2025    Diabetes Urine Screening 05/21/2025    Lipid Panel 05/21/2025    DEXA Scan 05/22/2025    Low Dose Statin 06/27/2025    Foot Exam 07/21/2025    Hemoglobin A1c 07/21/2025     Immunization History   Administered Date(s) Administered    COVID-19, MRNA, LN-S, PF (MODERNA FULL 0.5 ML DOSE) 03/30/2021, 04/27/2021    Influenza - Quadrivalent - High Dose - PF (65 years and older) 11/20/2020    Influenza - Trivalent - Afluria, Fluzone MDV 08/29/2017    Influenza Split 08/29/2017    Pneumococcal Conjugate - 20 Valent 09/08/2022    Pneumococcal Polysaccharide - 23 Valent 04/28/2016       Review of patient's allergies indicates:  No Known  "Allergies  Current Medications[1]        Objective:      Vitals:    07/21/25 0938 07/21/25 0941 07/21/25 1000   BP: (!) 142/60 (!) 140/60 136/76   Pulse: 78     SpO2: 97%     Weight: 72.6 kg (160 lb)     Height: 5' 4" (1.626 m)         Physical Exam  Vitals and nursing note reviewed.   Constitutional:       General: She is not in acute distress.     Appearance: Normal appearance. She is well-developed. She is not toxic-appearing.   HENT:      Head: Normocephalic and atraumatic.   Neck:      Thyroid: No thyroid mass or thyromegaly.   Cardiovascular:      Rate and Rhythm: Normal rate and regular rhythm.      Pulses:           Dorsalis pedis pulses are 2+ on the right side and 2+ on the left side.      Heart sounds: Murmur (2nd ICS LSB) heard.      Systolic murmur is present with a grade of 2/6.   Pulmonary:      Effort: Pulmonary effort is normal. No respiratory distress.      Breath sounds: Normal breath sounds. No wheezing or rales.   Abdominal:      Palpations: Abdomen is soft.   Musculoskeletal:      Cervical back: Neck supple.      Right lower leg: No edema.      Left lower leg: No edema.   Skin:     General: Skin is warm and dry.      Findings: No rash.   Neurological:      General: No focal deficit present.      Mental Status: She is alert and oriented to person, place, and time.      Motor: Motor function is intact.      Gait: Gait normal.          Assessment:       1. Uncontrolled type 2 diabetes mellitus with hyperglycemia    2. Essential hypertension    3. Dyslipidemia    4. Lumbar radiculopathy    5. Stage 3a chronic kidney disease    6. Vaginal candidiasis    7. Abnormal CT scan, neck           Assessment & Plan    UNCONTROLLED TYPE 2 DIABETES MELLITUS:  - Noted A1C increase from 7.2 to 8.5, attributed to medication non-adherence and temporary discontinuation of Mounjaro for 2 weeks due to refill issues.  - Educated patient about importance of diabetes control in preventing complications such as " myocardial infarction, cerebrovascular accident, renal failure, and amputations.  - Restarted Mounjaro 10 mg and continued current regimen: Glipizide 2 tablets twice daily, Jardiance daily, and Metformin 1000 mg twice daily.  - Ordered Dexcom continuous glucose monitor (reorder from May).  - Recommend improving dietary habits and monitoring glucose levels at home, particularly noting if levels exceed 140 in the morning or 180 later in the day.  - Educated on connection between high glucose and increased risk of yeast infections, especially with Jardiance use.  - Sent Mounjaro refill to [facility] on [facility].  - Scheduled follow up in 3 months for A1C recheck.  - Instructed patient to contact office if glucose remains high to discuss potential Mounjaro dose increase.  - Educated patient about diabetes causing neuropathy, particularly affecting the feet.  - Advised that controlling blood sugar is essential to manage neuropathy symptoms.    ESSENTIAL HYPERTENSION:  - BP improved on rechecked to 136/76  - previously Reduced Amlodipine dosage which has helped resolve leg swelling complaints   - Instructed patient to inform Dr. Varner of medication changes.    DYSLIPIDEMIA  -well controlled with LDL 63 on May lab    STAGE 3A CKD  -stable with GFR 54 on May labs, negative microalbuminuria    ABNORMAL CT OF NECK  - Reviewed ENT follow-up from March showing right paratracheal nodule unchanged, indicating stability.  - ENT Plans to repeat CT scan in 6 months to continue monitoring neck mass.    VAGINAL CANDIDIASIS  - Patient reports some vaginal itching without discharge. Previously treated with one dose of diflucan which helped symptoms  - Evaluated for recurrent yeast infection, likely exacerbated by elevated glucose and possibly Jardiance use.  - Prescribed fluconazole for yeast infection: 1 pill today and 1 pill in 3 days.  - Instructed patient to contact office if symptoms persist or recur.    LUMBAR RADICULOPATHY:  -  Patient reports leg pain and heaviness, evaluated as likely due to nerve issues in the back. Some improvement after back injections  - Confirmed good pulse in leg, indicating blood flow is not the issue.           Plan:       1. Uncontrolled type 2 diabetes mellitus with hyperglycemia  -     Hemoglobin A1C, POCT  -      DIABETES FOOT EXAM  -     tirzepatide 10 mg/0.5 mL PnIj; Inject 10 mg into the skin every 7 days.  Dispense: 4 Pen; Refill: 5    2. Essential hypertension    3. Dyslipidemia    4. Lumbar radiculopathy    5. Stage 3a chronic kidney disease    6. Vaginal candidiasis  -     fluconazole (DIFLUCAN) 150 MG Tab; Take one tablet today and repeat in 3 days  Dispense: 2 tablet; Refill: 0    7. Abnormal CT scan, neck      Follow up in about 3 months (around 10/21/2025).          Counseled on age and gender appropriate medical preventative services, including cancer screenings, immunizations, overall nutritional health, need for a consistent exercise regimen and an overall push towards maintaining a vigorous and active lifestyle.      This note was generated with the assistance of ambient listening technology. Verbal consent was obtained by the patient and accompanying visitor(s) for the recording of patient appointment to facilitate this note. I attest to having reviewed and edited the generated note for accuracy, though some syntax or spelling errors may persist. Please contact the author of this note for any clarification.       7/21/2025 Priyanka Garibay NP           [1]   Current Outpatient Medications:     alcohol swabs (ALCOHOL WIPES) PadM, Apply 1 each topically once daily., Disp: 200 each, Rfl: 1    amlodipine-valsartan (EXFORGE) 5-320 mg per tablet, Take 1 tablet by mouth once daily., Disp: 90 tablet, Rfl: 3    aspirin 325 MG tablet, Take 325 mg by mouth once daily. , Disp: , Rfl:     blood sugar diagnostic Strp, To check BG 2 times daily, to use with insurance preferred meter, Disp: 180 strip, Rfl:  3    blood-glucose meter kit, To check BG 2 times daily, to use with insurance preferred meter, Disp: 1 each, Rfl: 0    blood-glucose sensor (DEXCOM G7 SENSOR) [place], 1 each by Misc.(Non-Drug; Combo Route) route every 10 days., Disp: 3 each, Rfl: 11    blood-glucose,,cont (DEXCOM G7 ) Misc, 1 each by Misc.(Non-Drug; Combo Route) route once daily., Disp: 1 each, Rfl: 0    calcium-vitamin D tablet 600 mg-200 units, Take 1 tablet by mouth once daily. , Disp: , Rfl:     cholecalciferol, vitamin D3, (VITAMIN D3) 2,000 unit Cap, Take 1 capsule by mouth once daily. , Disp: , Rfl:     fluconazole (DIFLUCAN) 150 MG Tab, Take one tablet today and repeat in 3 days, Disp: 2 tablet, Rfl: 0    gabapentin (NEURONTIN) 300 MG capsule, Take 1 capsule in AM and 2 capsules at bedtime, Disp: 270 capsule, Rfl: 3    glipiZIDE (GLUCOTROL) 5 MG tablet, Take 2 tablets (10 mg total) by mouth 2 (two) times daily with meals., Disp: 360 tablet, Rfl: 3    JARDIANCE 25 mg tablet, Take 1 tablet (25 mg total) by mouth once daily., Disp: 90 tablet, Rfl: 3    lancets Mis, To check BG 2 times daily, to use with insurance preferred meter, Disp: 180 each, Rfl: 3    LUMIGAN 0.01 % Drop, Place 1 drop into both eyes every evening., Disp: , Rfl:     meloxicam (MOBIC) 15 MG tablet, Take 1 tablet (15 mg total) by mouth once daily., Disp: 90 tablet, Rfl: 3    metFORMIN (GLUCOPHAGE) 1000 MG tablet, Take 1 tablet (1,000 mg total) by mouth 2 (two) times daily with meals., Disp: 180 tablet, Rfl: 3    metoprolol succinate (TOPROL-XL) 50 MG 24 hr tablet, Take 1/2 tablet in AM and 1 tablet at bedtime, Disp: 135 tablet, Rfl: 3    omeprazole (PRILOSEC) 20 MG capsule, Take 1 capsule (20 mg total) by mouth once daily., Disp: 90 capsule, Rfl: 3    ondansetron (ZOFRAN-ODT) 4 MG TbDL, Take 1 tablet (4 mg total) by mouth every 6 (six) hours as needed (nausea/vomiting)., Disp: 12 tablet, Rfl: 0    pravastatin (PRAVACHOL) 40 MG tablet, Take 1 tablet (40 mg  total) by mouth once daily. For cholesterol., Disp: 90 tablet, Rfl: 3    tirzepatide 10 mg/0.5 mL PnIj, Inject 10 mg into the skin every 7 days., Disp: 4 Pen, Rfl: 5    tiZANidine (ZANAFLEX) 4 MG tablet, Take 1 tablet (4 mg total) by mouth nightly as needed (back pain/muscle spasm)., Disp: 90 tablet, Rfl: 3    traMADoL (ULTRAM) 50 mg tablet, Take 1 tablet (50 mg total) by mouth every 6 (six) hours as needed for Pain., Disp: 20 tablet, Rfl: 0

## 2025-07-21 NOTE — PATIENT INSTRUCTIONS
We understand that controlling diabetes can feel overwhelming at times. We are here to offer the tools and support to help you manage your diabetes and meet your health goals. Please reference the meal planning guide below.     Diabetic Meal Planning    About this topic  Healthy eating is an important part of keeping your diabetes in control. A balanced diet along with your diabetic drugs will help you control your blood sugar. The right portions of healthy foods may help keep your sugar within your goal range. It may also help to lower or maintain your weight, manage cholesterol, the amount of fat in your blood, and blood pressure.      Image(s)    What will the results be?  Healthy eating may help you lower your blood sugar and keep it in a safe range. Keeping your blood sugar in a safe range may lower your chances for long term problems from your diabetes. You may be less likely to have damage to your kidneys, heart, eyes, or nerves.    What changes to diet are needed?  Healthy eating means:  Eating a range of foods from all food groups.  Eating the right size portions. Read the nutrition facts on each food you eat.  Eating meals and snacks at the same time each day. Do not skip a meal or snack. Skipping meals may cause your blood sugar to go too low, especially if you are on drugs for your diabetes. Skipping meals can also cause you to eat too much at the next meal.  Talk to your diabetes educator about making a personal meal plan for you. They can also help you eat the foods you like by modifying them to be healthier.    Who should use this diet?  This diet is helpful to people with high blood sugar or diabetes.    What foods are good to eat?  It is important to make a healthy meal. Eat a variety of different foods in the right portion.  Fill half of your plate with non-starchy vegetables. Non-starchy vegetables include: Broccoli, green beans, carrots, greens (chinmay, kale, mustard, turnip), onions, tomatoes,  asparagus, beets, cauliflower, celery, and cucumber. Non-starchy vegetables are high in fiber and low in carbohydrates. These will help keep you full for longer without raising your blood sugar.  Fill 1/4 of your plate with carbohydrates. Try to choose whole grain options. Whole-grain products have more fiber which will make you feel full. Carbohydrates include: Bread, rice, pasta, and starchy vegetables. Starchy vegetables include beans, potatoes, acorn squash, butternut squash, corn, and peas.  Fill 1/4 of your plate with protein. Choose low-fat cuts of meat like boneless, skinless chicken breast; pork loin; 90% lean beef; lean and skinless turkey meat; and fresh fish (not fried) such as tuna, salmon, or mackerel.  Add a low-fat or non-fat dairy product like 8 ounces (240 mL) of 1% or skim milk or 6 ounces (180 mL) of low-fat yogurt. Eat the recommended serving. Milk and yogurt have carbohydrates which raise your blood sugar.  Add a small piece of fruit or 1/2 cup (120 grams) of frozen or canned fruit. Choose canned fruits in juice or water. Fruits include apples, bananas, peaches, pears, pineapple, plums, and oranges. Eat the recommended serving. Fruit has carbohydrates which can raise your blood sugar.  Include healthy fats in your meal like olive oil, canola oil, avocado, and nuts.  Other good foods to include in your diet are:  Foods high in fiber. Adding fiber to your meals may help control your blood sugar and cholesterol levels. It may also help with weight loss and prevent belly problems. If you are younger than 50, it is recommended to get 25 to 38 grams per day. If you are older than 50, it is recommended to get 21 to 30 grams per day. Good sources of fiber include:  Nuts and seeds  Beans, peas, and other legumes  Whole-grain products  Fruits  Vegetables  Smart snacks in the right portion. Do not go too long in between meals. Ask your dietitian when you should have a snack in between your meals. Snack on  things like:  Nuts  Vegetables and low-fat dressing  Light popcorn  Low-fat cheese and crackers  1/2 sandwich    What foods should be limited or avoided?  You may need to limit the amount of some foods you eat and how often you eat them. Foods that should be limited include:  High fat or processed foods like:  Chow  Sausage  Hot dogs  Whole-fat dairy products  Potato chips  Foods high in sodium like:  Canned soups  Soy sauce and some salad dressings  Cured meats  Salted snack foods like pretzels  Olives  Fats and oils like:  Margarine  Salad dressing  Gravy  Lard or shortening  Foods high in sugar like:  Candy  Cookies  Cake  Ice cream  Table sugar  Soda  Juice drinks  Starches that are not whole grain like:  White rice  French fries  White pasta  White bread  Sugary cereals  Baked goods, pastries, croissants  Beer, wine, and mixed drinks (alcohol). Drink alcohol in moderation (1 drink per day or less for adult women and 2 drinks per day or less for adult men). Drinking alcohol can cause fluctuations in your blood sugar and interfere with how your diabetic drugs work. Talk to your doctor about how you can safely include alcohol into your diet.    What can be done to prevent this health problem?  Some people have a higher chance of developing diabetes than others. This is a life-long problem. You can still lead a normal life. Diabetes can be managed through diet, exercise, and drugs. It is important to have support from your family and friends to help you with your goals.    When do I need to call the doctor?  Blood sugar level is above 240 mg/dL for more than a day  Blood sugar level drops to less than 40 and does not respond to 15 grams of simple carbohydrate, like 4 glucose tablets or 1/2 cup (120 mL) of fruit juice  Trouble breathing  Very sleepy and trouble concentrating  Feeling very thirsty  Needing to urinate (pee) more than normal  Throw up more than once or have many loose stools  You are so sick you  cannot eat or drink  Fever over 101°F (38°C)  Questions about your diet plan  You are not feeling better in 2 to 3 days or you are feeling worse    Helpful tips  Plan ahead. Plan your meals and grocery list before going to the store. This will help you to choose foods that are good for you.  Pack a lunch. Take healthy meals and snacks with you to work.  Keep a food journal to help keep you on track. Take note of foods that keep your blood sugar in goal range. Also note foods and meals that raise or lower your blood sugar. There are apps for your phone that can help with this.  Visit a restaurant's website before eating out. You can see the menu options and nutritional facts. This way, you can see which items best fit into your diet plan. Call ahead if you have questions.    Disclaimer.This generalized information is a limited summary of diagnosis, treatment, and/or medication information. It is not meant to be comprehensive and should be used as a tool to help the user understand and/or assess potential diagnostic and treatment options. It does NOT include all information about conditions, treatments, medications, side effects, or risks that may apply to a specific patient. It is not intended to be medical advice or a substitute for the medical advice, diagnosis, or treatment of a health care provider based on the health care provider's examination and assessment of a patient's specific and unique circumstances. Patients must speak with a health care provider for complete information about their health, medical questions, and treatment options, including any risks or benefits regarding use of medications. This information does not endorse any treatments or medications as safe, effective, or approved for treating a specific patient. [employer] and its affiliates disclaim any warranty or liability relating to this information or the use thereof. The use of this information is governed by the Terms of Use,  available at Terms of Use. ©2022 [employer] and its affiliates and/or licensors. All rights reserved. Gabino Ellis,     If you are due for any health screening(s) below please notify me so we can arrange them to be ordered and scheduled. Most healthy patients at your age complete them, but you are free to accept or refuse.     If you can't do it, I'll definitely understand. If you can, I'd certainly appreciate it!    Tests to Keep You Healthy    Mammogram: Met on 12/5/2024  Eye Exam: Met on 2/13/2025  Colon Cancer Screening: Met on 11/10/2023  Last Blood Pressure <= 139/89 (7/21/2025): NO  Last HbA1c < 8 (05/14/2025): Yes      Lets manage your high blood pressure     Your blood pressure was above 140/90 today during your visit. We recommend that you schedule a nurse visit in two weeks to check your blood pressure and discuss ways to support your health goals.     You can also manage your health and record your blood pressure from the comfort of home by keeping a daily blood pressure log. These results are shared with and reviewed by your provider. Please print this form (Daily Blood Pressure Log) to assist you in keeping track of your blood pressure at home.     Schedule your nurse visit in two weeks to learn more about how to track and manage high blood pressure.    Daily Blood Pressure Log    Name:__________________________________                  Date of Birth:_________    Average Blood Pressure:  __________      Date: Time  (a.m.) Blood  Pressure: Pulse  Rate: Time  (p.m.) Blood  Pressure : Pulse  Rate:   Sample 8:37 127/83 84                                                                                                                                                                                 Lets manage your A1c levels     Your last hemoglobin A1c was higher than the goal of less than 8. Hemoglobin A1c or HbA1c is a blood test that measures your average blood sugar levels over the past 3 months. It is  the main test to help you and your health care team manage your diabetes.     Higher A1c levels are linked to diabetes complications, such as loss of vision, kidney disease, and nerve damage. Keeping your A1c at least less than 8 is important to stay healthy and we are here to help. Talk with your provider on how you can further improve your A1c.     We recommend that you set up blood work to get a repeat hemoglobin A1c in 3 months to monitor your diabetes. Let your health care team know if you have questions.

## (undated) DEVICE — SYS LABEL CORRECT MED

## (undated) DEVICE — NDL SPINAL 22GX5

## (undated) DEVICE — CHLORAPREP 10.5 ML APPLICATOR

## (undated) DEVICE — TUBING MINIBORE EXTENSION

## (undated) DEVICE — TOWEL OR DISP STRL BLUE 4/PK

## (undated) DEVICE — GLOVE SENSICARE PI GRN 7.5